# Patient Record
Sex: FEMALE | Race: WHITE | NOT HISPANIC OR LATINO | Employment: UNEMPLOYED | URBAN - METROPOLITAN AREA
[De-identification: names, ages, dates, MRNs, and addresses within clinical notes are randomized per-mention and may not be internally consistent; named-entity substitution may affect disease eponyms.]

---

## 2017-06-01 PROBLEM — R80.9 TYPE 2 DIABETES MELLITUS WITH MICROALBUMINURIA, WITH LONG-TERM CURRENT USE OF INSULIN: Status: ACTIVE | Noted: 2017-06-01

## 2017-06-01 PROBLEM — E11.29 TYPE 2 DIABETES MELLITUS WITH MICROALBUMINURIA, WITH LONG-TERM CURRENT USE OF INSULIN: Status: ACTIVE | Noted: 2017-06-01

## 2017-06-01 PROBLEM — Z79.4 TYPE 2 DIABETES MELLITUS WITH MICROALBUMINURIA, WITH LONG-TERM CURRENT USE OF INSULIN: Status: ACTIVE | Noted: 2017-06-01

## 2017-06-02 PROBLEM — G89.29 CHRONIC BILATERAL LOW BACK PAIN WITHOUT SCIATICA: Status: ACTIVE | Noted: 2017-06-02

## 2017-06-02 PROBLEM — M54.50 CHRONIC BILATERAL LOW BACK PAIN WITHOUT SCIATICA: Status: ACTIVE | Noted: 2017-06-02

## 2017-07-13 PROBLEM — Z12.11 ENCOUNTER FOR SCREENING COLONOSCOPY: Status: ACTIVE | Noted: 2017-07-13

## 2018-01-29 ENCOUNTER — TELEPHONE (OUTPATIENT)
Dept: ADMINISTRATIVE | Facility: HOSPITAL | Age: 58
End: 2018-01-29

## 2019-01-30 ENCOUNTER — TELEPHONE (OUTPATIENT)
Dept: ADMINISTRATIVE | Facility: HOSPITAL | Age: 59
End: 2019-01-30

## 2020-10-28 ENCOUNTER — HOSPITAL ENCOUNTER (EMERGENCY)
Facility: HOSPITAL | Age: 60
Discharge: HOME | End: 2020-10-28
Attending: EMERGENCY MEDICINE
Payer: MEDICAID

## 2020-10-28 VITALS
HEART RATE: 82 BPM | DIASTOLIC BLOOD PRESSURE: 83 MMHG | SYSTOLIC BLOOD PRESSURE: 152 MMHG | TEMPERATURE: 98 F | RESPIRATION RATE: 18 BRPM | OXYGEN SATURATION: 99 %

## 2020-10-28 DIAGNOSIS — I10 HYPERTENSION, UNSPECIFIED TYPE: ICD-10-CM

## 2020-10-28 DIAGNOSIS — R53.1 WEAKNESS: Primary | ICD-10-CM

## 2020-10-28 LAB
ALBUMIN SERPL BCP-MCNC: 3.9 G/DL (ref 3.5–5.2)
ALP SERPL-CCNC: 133 U/L (ref 55–135)
ALT SERPL W/O P-5'-P-CCNC: 35 U/L (ref 10–44)
ANION GAP SERPL CALC-SCNC: 6 MMOL/L (ref 8–16)
AST SERPL-CCNC: 28 U/L (ref 10–40)
BACTERIA #/AREA URNS HPF: ABNORMAL /HPF
BASOPHILS # BLD AUTO: 0.05 K/UL (ref 0–0.2)
BASOPHILS NFR BLD: 0.4 % (ref 0–1.9)
BILIRUB SERPL-MCNC: 0.5 MG/DL (ref 0.1–1)
BILIRUB UR QL STRIP: NEGATIVE
BUN SERPL-MCNC: 7 MG/DL (ref 6–20)
CALCIUM SERPL-MCNC: 9.1 MG/DL (ref 8.7–10.5)
CHLORIDE SERPL-SCNC: 106 MMOL/L (ref 95–110)
CLARITY UR: CLEAR
CO2 SERPL-SCNC: 27 MMOL/L (ref 23–29)
COLOR UR: YELLOW
CREAT SERPL-MCNC: 0.7 MG/DL (ref 0.5–1.4)
DIFFERENTIAL METHOD: ABNORMAL
EOSINOPHIL # BLD AUTO: 0.2 K/UL (ref 0–0.5)
EOSINOPHIL NFR BLD: 1.5 % (ref 0–8)
ERYTHROCYTE [DISTWIDTH] IN BLOOD BY AUTOMATED COUNT: 12.9 % (ref 11.5–14.5)
EST. GFR  (AFRICAN AMERICAN): >60 ML/MIN/1.73 M^2
EST. GFR  (NON AFRICAN AMERICAN): >60 ML/MIN/1.73 M^2
GLUCOSE SERPL-MCNC: 191 MG/DL (ref 70–110)
GLUCOSE UR QL STRIP: NEGATIVE
HCT VFR BLD AUTO: 41.4 % (ref 37–48.5)
HGB BLD-MCNC: 13.2 G/DL (ref 12–16)
HGB UR QL STRIP: NEGATIVE
HYALINE CASTS #/AREA URNS LPF: 1 /LPF
IMM GRANULOCYTES # BLD AUTO: 0.04 K/UL (ref 0–0.04)
IMM GRANULOCYTES NFR BLD AUTO: 0.3 % (ref 0–0.5)
KETONES UR QL STRIP: NEGATIVE
LEUKOCYTE ESTERASE UR QL STRIP: ABNORMAL
LYMPHOCYTES # BLD AUTO: 1.8 K/UL (ref 1–4.8)
LYMPHOCYTES NFR BLD: 15.8 % (ref 18–48)
MCH RBC QN AUTO: 29.5 PG (ref 27–31)
MCHC RBC AUTO-ENTMCNC: 31.9 G/DL (ref 32–36)
MCV RBC AUTO: 92 FL (ref 82–98)
MICROSCOPIC COMMENT: ABNORMAL
MONOCYTES # BLD AUTO: 0.7 K/UL (ref 0.3–1)
MONOCYTES NFR BLD: 6 % (ref 4–15)
NEUTROPHILS # BLD AUTO: 8.8 K/UL (ref 1.8–7.7)
NEUTROPHILS NFR BLD: 76 % (ref 38–73)
NITRITE UR QL STRIP: NEGATIVE
NRBC BLD-RTO: 0 /100 WBC
PH UR STRIP: 7 [PH] (ref 5–8)
PLATELET # BLD AUTO: 308 K/UL (ref 150–350)
PMV BLD AUTO: 10.8 FL (ref 9.2–12.9)
POTASSIUM SERPL-SCNC: 3.9 MMOL/L (ref 3.5–5.1)
PROT SERPL-MCNC: 8.3 G/DL (ref 6–8.4)
PROT UR QL STRIP: ABNORMAL
RBC # BLD AUTO: 4.48 M/UL (ref 4–5.4)
RBC #/AREA URNS HPF: 1 /HPF (ref 0–4)
SODIUM SERPL-SCNC: 139 MMOL/L (ref 136–145)
SP GR UR STRIP: <=1.005 (ref 1–1.03)
SQUAMOUS #/AREA URNS HPF: 1 /HPF
URN SPEC COLLECT METH UR: ABNORMAL
UROBILINOGEN UR STRIP-ACNC: NEGATIVE EU/DL
WBC # BLD AUTO: 11.61 K/UL (ref 3.9–12.7)
WBC #/AREA URNS HPF: 10 /HPF (ref 0–5)

## 2020-10-28 PROCEDURE — 99284 EMERGENCY DEPT VISIT MOD MDM: CPT | Mod: 25

## 2020-10-28 PROCEDURE — 85025 COMPLETE CBC W/AUTO DIFF WBC: CPT

## 2020-10-28 PROCEDURE — 36415 COLL VENOUS BLD VENIPUNCTURE: CPT

## 2020-10-28 PROCEDURE — 81000 URINALYSIS NONAUTO W/SCOPE: CPT

## 2020-10-28 PROCEDURE — 80053 COMPREHEN METABOLIC PANEL: CPT

## 2020-10-28 RX ORDER — METOPROLOL TARTRATE 25 MG/1
25 TABLET, FILM COATED ORAL 2 TIMES DAILY
COMMUNITY
End: 2023-06-23

## 2020-10-28 RX ORDER — HYDROCHLOROTHIAZIDE 12.5 MG/1
12.5 CAPSULE ORAL DAILY
COMMUNITY
End: 2021-11-30

## 2020-10-28 RX ORDER — AZILSARTAN KAMEDOXOMIL 40 MG/1
TABLET ORAL DAILY
COMMUNITY
End: 2023-04-12

## 2020-10-28 RX ORDER — NAPROXEN SODIUM 220 MG/1
81 TABLET, FILM COATED ORAL DAILY
COMMUNITY
End: 2021-11-30

## 2020-10-28 RX ORDER — AMLODIPINE BESYLATE 5 MG/1
10 TABLET ORAL DAILY
COMMUNITY
End: 2023-04-12 | Stop reason: SDUPTHER

## 2020-10-28 RX ORDER — ALBUTEROL SULFATE 90 UG/1
2 AEROSOL, METERED RESPIRATORY (INHALATION) EVERY 6 HOURS PRN
COMMUNITY

## 2020-10-28 NOTE — ED PROVIDER NOTES
Encounter Date: 10/28/2020       History     Chief Complaint   Patient presents with    Extremity Weakness     right sided weakness x 1 month with HTN.  Today fell from the weakness.  Per PCP and [facility] it's caused from her HTN meds.     This is a 60-year-old white female history of diabetes and hypertension, presents to the emergency department after sliding out of bed.  Patient denies any pain.  She has a history of right-sided arm and leg weakness for over a month, was seen at an outlying emergency department where she was worked up and told it was due to her blood pressure medication.        Review of patient's allergies indicates:   Allergen Reactions    Levemir [insulin detemir] Other (See Comments)     Sob rash to arms and chest  abd cramping diarrhea    Lisinopril Swelling    Shellfish containing products      Other^asthma    Losartan Other (See Comments)     Muscle cramp     Past Medical History:   Diagnosis Date    Cirrhosis of liver without mention of alcohol     COPD (chronic obstructive pulmonary disease)     Diabetes mellitus, type 2     Gout, unspecified     Hyperlipidemia     Hypertension     Obesity, unspecified     Unspecified vitamin D deficiency      Past Surgical History:   Procedure Laterality Date     SECTION  , ,  1985     x3    CHOLECYSTECTOMY  2011    COLONOSCOPY N/A 2017    Procedure: COLONOSCOPY;  Surgeon: Luis Bogran-Reyes, MD;  Location: [facility];  Service: Endoscopy;  Laterality: N/A;     Family History   Problem Relation Age of Onset    Alzheimer's disease Mother     Stroke Mother     Hypertension Mother     Heart attack Father     Hypertension Father     Diabetes Maternal Grandmother      Social History     Tobacco Use    Smoking status: Former Smoker     Quit date: 1979     Years since quittin.7    Smokeless tobacco: Never Used   Substance Use Topics    Alcohol use: No    Drug use: No     Review of Systems    Constitutional: Negative for fever.   HENT: Negative for sore throat.    Respiratory: Negative for shortness of breath.    Cardiovascular: Negative for chest pain.   Gastrointestinal: Negative for nausea.   Genitourinary: Negative for dysuria.   Musculoskeletal: Negative for back pain.   Skin: Negative for rash.   Neurological: Positive for weakness.   Hematological: Does not bruise/bleed easily.   All other systems reviewed and are negative.      Physical Exam     Initial Vitals [10/28/20 1032]   BP Pulse Resp Temp SpO2   (!) 191/86 85 18 98.5 °F (36.9 °C) 99 %      MAP       --         Physical Exam    Nursing note and vitals reviewed.  Constitutional: She appears well-developed and well-nourished. She is not diaphoretic. No distress.   HENT:   Head: Normocephalic and atraumatic.   Eyes: Conjunctivae and EOM are normal. Pupils are equal, round, and reactive to light.   Neck: Normal range of motion. Neck supple.   Cardiovascular: Normal rate, regular rhythm, normal heart sounds and intact distal pulses.   No murmur heard.  Pulmonary/Chest: Breath sounds normal. No respiratory distress. She has no wheezes. She has no rhonchi. She has no rales. She exhibits no tenderness.   Abdominal: Soft. Bowel sounds are normal.   Musculoskeletal: Normal range of motion. No tenderness or edema.   Neurological: She is alert and oriented to person, place, and time. She has normal reflexes. No cranial nerve deficit. GCS score is 15. GCS eye subscore is 4. GCS verbal subscore is 5. GCS motor subscore is 6.   A very slight weakness of the right arm and right leg.  Negative Babinski.  Strong pulses.  No distal cyanosis.   Skin: Skin is warm and dry. Capillary refill takes less than 2 seconds.         ED Course   Procedures  Labs Reviewed   CBC W/ AUTO DIFFERENTIAL - Abnormal; Notable for the following components:       Result Value    MCHC 31.9 (*)     Gran # (ANC) 8.8 (*)     Gran % 76.0 (*)     Lymph % 15.8 (*)     All other  components within normal limits   COMPREHENSIVE METABOLIC PANEL - Abnormal; Notable for the following components:    Glucose 191 (*)     Anion Gap 6 (*)     All other components within normal limits   URINALYSIS, REFLEX TO URINE CULTURE - Abnormal; Notable for the following components:    Specific Gravity, UA <=1.005 (*)     Protein, UA Trace (*)     Leukocytes, UA 1+ (*)     All other components within normal limits    Narrative:     Specimen Source->Urine   URINALYSIS MICROSCOPIC - Abnormal; Notable for the following components:    WBC, UA 10 (*)     Bacteria Moderate (*)     All other components within normal limits    Narrative:     Specimen Source->Urine          Imaging Results          CT Head Without Contrast (Final result)  Result time 10/28/20 10:58:16    Final result by Tracie Malin MD (10/28/20 10:58:16)                 Impression:      No acute intracranial findings.      Electronically signed by: Tracie Malin MD  Date:    10/28/2020  Time:    10:58             Narrative:    EXAMINATION:  CT HEAD WITHOUT CONTRAST    CLINICAL HISTORY:  Ataxia, stroke suspected;    TECHNIQUE:  Iterative reconstruction technique was performed.    CT/Cardiac Nuclear exams in prior 12 months: 0    COMPARISON:  None.    FINDINGS:  Ventricles and midline structures are normal.  No mass lesion acute hemorrhage or acute infarction.  The skull is intact.  Mild chronic changes                               RADIOLOGY REPORT (Final result)  Result time 10/29/20 12:02:35    Final result by Unknown User (10/29/20 12:02:35)                                                     ED Course as of Nov 04 0606   Wed Oct 28, 2020   1102 CT is negative for any acute changes.    [SD]   1148 RBC, UA(!): Review [SD]      ED Course User Index  [SD] Chavo Jang MD            Clinical Impression:     ICD-10-CM ICD-9-CM   1. Weakness  R53.1 780.79   2. Hypertension, unspecified type  I10 401.9                          ED Disposition Condition     Discharge Stable        ED Prescriptions     None        Follow-up Information     Follow up With Specialties Details Why Contact Info    Primary care physician  In 2 days                                         Chavo Jang MD  10/28/20 1127       Chavo Jang MD  11/04/20 0606

## 2020-10-28 NOTE — ED NOTES
NEUROLOGICAL:   Patient is awake , alert  and oriented x 4 . Pupils are PERRL. Gait is unsteady  Moves all extremities without difficulty.   Patient reports right sided weakness  GCS 15     HEENT:   Head appears normocephalic  and symmetric .   Eyes appear WNL to both eyes. Patient reports no complaints  to both eyes .   Ears appear WNL. Patient reports no complaints  to both ears.   Nares appear patent . Patient reports no nose complaints .  Mouth appears moist, pink and teeth intact. Patient reports no mouth complaints.   Throat appears pink and moist . Patient reports no throat complaints.    CARDIOVASCULAR:   S1 and S2 present, no murmurs, gallops, or rubs, rate regular  and pulses palpable (2+)    On palpation no edema noted , noted to none.   Patient reports no CV complaints.  .   Patient vitals are WNL.    RESPIRATORY:   Airway Clear, Open, and Patent.  Respirations are even and unlabored.   Breath sounds clear  to all lung fields.   Patient reports no respiratory complaints.     GASTROINTESTINAL:   Abdomen is soft  and non-tender x 4 quadrants. Bowel sounds are normoactive to all quadrants .   Patient reports no GI complaints .     GENITOURINARY:   Patient reports no  complaints.     MUSCULOSKELETAL:   full range of motion to all extremities, no swelling noted , no tenderness noted and no weakness noted.   Patient reports no musculoskeletal complaints     SKIN:   Skin appears warm , dry , good turgor, color normal for race and intact. Patient reports no skin complaints.

## 2020-10-29 ENCOUNTER — HOSPITAL ENCOUNTER (OUTPATIENT)
Facility: HOSPITAL | Age: 60
Discharge: HOME | End: 2020-10-30
Attending: EMERGENCY MEDICINE | Admitting: EMERGENCY MEDICINE
Payer: MEDICAID

## 2020-10-29 DIAGNOSIS — R53.1 UNILATERAL WEAKNESS: ICD-10-CM

## 2020-10-29 DIAGNOSIS — N30.90 CYSTITIS: ICD-10-CM

## 2020-10-29 DIAGNOSIS — E11.9 TYPE 2 DIABETES MELLITUS WITHOUT OPHTHALMIC MANIFESTATIONS: ICD-10-CM
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
## 2020-10-29 DIAGNOSIS — N30.00 ACUTE CYSTITIS WITHOUT HEMATURIA: ICD-10-CM

## 2020-10-29 DIAGNOSIS — M51.36 DDD (DEGENERATIVE DISC DISEASE), LUMBAR: Primary | ICD-10-CM

## 2020-10-29 PROBLEM — G72.9 MYOPATHY: Status: ACTIVE | Noted: 2020-10-29

## 2020-10-29 PROBLEM — M51.369 DDD (DEGENERATIVE DISC DISEASE), LUMBAR: Status: ACTIVE | Noted: 2020-10-29

## 2020-10-29 LAB
ALBUMIN SERPL BCP-MCNC: 4 G/DL (ref 3.5–5.2)
ALP SERPL-CCNC: 133 U/L (ref 55–135)
ALT SERPL W/O P-5'-P-CCNC: 36 U/L (ref 10–44)
AMMONIA PLAS-SCNC: <10 UMOL/L (ref 10–50)
ANION GAP SERPL CALC-SCNC: 6 MMOL/L (ref 8–16)
AST SERPL-CCNC: 30 U/L (ref 10–40)
BACTERIA #/AREA URNS HPF: NEGATIVE /HPF
BASOPHILS # BLD AUTO: 0.05 K/UL (ref 0–0.2)
BASOPHILS NFR BLD: 0.4 % (ref 0–1.9)
BILIRUB SERPL-MCNC: 0.6 MG/DL (ref 0.1–1)
BILIRUB UR QL STRIP: NEGATIVE
BUN SERPL-MCNC: 10 MG/DL (ref 6–20)
CALCIUM SERPL-MCNC: 8.9 MG/DL (ref 8.7–10.5)
CHLORIDE SERPL-SCNC: 105 MMOL/L (ref 95–110)
CLARITY UR: CLEAR
CO2 SERPL-SCNC: 26 MMOL/L (ref 23–29)
COLOR UR: YELLOW
CREAT SERPL-MCNC: 0.9 MG/DL (ref 0.5–1.4)
DIFFERENTIAL METHOD: ABNORMAL
EOSINOPHIL # BLD AUTO: 0.1 K/UL (ref 0–0.5)
EOSINOPHIL NFR BLD: 1 % (ref 0–8)
ERYTHROCYTE [DISTWIDTH] IN BLOOD BY AUTOMATED COUNT: 12.8 % (ref 11.5–14.5)
EST. GFR  (AFRICAN AMERICAN): >60 ML/MIN/1.73 M^2
EST. GFR  (NON AFRICAN AMERICAN): >60 ML/MIN/1.73 M^2
ETHANOL SERPL-MCNC: <3 MG/DL
GLUCOSE SERPL-MCNC: 236 MG/DL (ref 70–110)
GLUCOSE UR QL STRIP: NEGATIVE
HCT VFR BLD AUTO: 42 % (ref 37–48.5)
HGB BLD-MCNC: 13.5 G/DL (ref 12–16)
HGB UR QL STRIP: NEGATIVE
HYALINE CASTS #/AREA URNS LPF: 1 /LPF
IMM GRANULOCYTES # BLD AUTO: 0.06 K/UL (ref 0–0.04)
IMM GRANULOCYTES NFR BLD AUTO: 0.5 % (ref 0–0.5)
KETONES UR QL STRIP: ABNORMAL
LEUKOCYTE ESTERASE UR QL STRIP: ABNORMAL
LYMPHOCYTES # BLD AUTO: 2 K/UL (ref 1–4.8)
LYMPHOCYTES NFR BLD: 15.7 % (ref 18–48)
MCH RBC QN AUTO: 29.5 PG (ref 27–31)
MCHC RBC AUTO-ENTMCNC: 32.1 G/DL (ref 32–36)
MCV RBC AUTO: 92 FL (ref 82–98)
MICROSCOPIC COMMENT: ABNORMAL
MONOCYTES # BLD AUTO: 0.9 K/UL (ref 0.3–1)
MONOCYTES NFR BLD: 6.8 % (ref 4–15)
NEUTROPHILS # BLD AUTO: 9.4 K/UL (ref 1.8–7.7)
NEUTROPHILS NFR BLD: 75.6 % (ref 38–73)
NITRITE UR QL STRIP: NEGATIVE
NRBC BLD-RTO: 0 /100 WBC
PH UR STRIP: 7 [PH] (ref 5–8)
PLATELET # BLD AUTO: 310 K/UL (ref 150–350)
PMV BLD AUTO: 11.3 FL (ref 9.2–12.9)
POCT GLUCOSE: 186 MG/DL (ref 70–110)
POTASSIUM SERPL-SCNC: 3.9 MMOL/L (ref 3.5–5.1)
PROT SERPL-MCNC: 8.3 G/DL (ref 6–8.4)
PROT UR QL STRIP: ABNORMAL
RBC # BLD AUTO: 4.57 M/UL (ref 4–5.4)
RBC #/AREA URNS HPF: 1 /HPF (ref 0–4)
SARS-COV-2 RDRP RESP QL NAA+PROBE: NEGATIVE
SODIUM SERPL-SCNC: 137 MMOL/L (ref 136–145)
SP GR UR STRIP: >=1.03 (ref 1–1.03)
SQUAMOUS #/AREA URNS HPF: 1 /HPF
TROPONIN I SERPL DL<=0.01 NG/ML-MCNC: <0.02 NG/ML (ref 0–0.03)
TSH SERPL DL<=0.005 MIU/L-ACNC: 2.34 UIU/ML (ref 0.4–4)
URN SPEC COLLECT METH UR: ABNORMAL
UROBILINOGEN UR STRIP-ACNC: NEGATIVE EU/DL
WBC # BLD AUTO: 12.46 K/UL (ref 3.9–12.7)
WBC #/AREA URNS HPF: 29 /HPF (ref 0–5)

## 2020-10-29 PROCEDURE — 99900031 HC PATIENT EDUCATION (STAT)

## 2020-10-29 PROCEDURE — G0378 HOSPITAL OBSERVATION PER HR: HCPCS | Mod: OBSCO

## 2020-10-29 PROCEDURE — 97162 PT EVAL MOD COMPLEX 30 MIN: CPT

## 2020-10-29 PROCEDURE — 25000003 PHARM REV CODE 250: Performed by: EMERGENCY MEDICINE

## 2020-10-29 PROCEDURE — 80053 COMPREHEN METABOLIC PANEL: CPT

## 2020-10-29 PROCEDURE — 63600175 PHARM REV CODE 636 W HCPCS: Performed by: INTERNAL MEDICINE

## 2020-10-29 PROCEDURE — 25000003 PHARM REV CODE 250: Performed by: NURSE PRACTITIONER

## 2020-10-29 PROCEDURE — 96372 THER/PROPH/DIAG INJ SC/IM: CPT | Mod: 59

## 2020-10-29 PROCEDURE — 36415 COLL VENOUS BLD VENIPUNCTURE: CPT

## 2020-10-29 PROCEDURE — 81000 URINALYSIS NONAUTO W/SCOPE: CPT

## 2020-10-29 PROCEDURE — 84443 ASSAY THYROID STIM HORMONE: CPT

## 2020-10-29 PROCEDURE — 87086 URINE CULTURE/COLONY COUNT: CPT

## 2020-10-29 PROCEDURE — 97165 OT EVAL LOW COMPLEX 30 MIN: CPT

## 2020-10-29 PROCEDURE — 99900035 HC TECH TIME PER 15 MIN (STAT)

## 2020-10-29 PROCEDURE — 84484 ASSAY OF TROPONIN QUANT: CPT

## 2020-10-29 PROCEDURE — 25500020 PHARM REV CODE 255: Performed by: EMERGENCY MEDICINE

## 2020-10-29 PROCEDURE — 80320 DRUG SCREEN QUANTALCOHOLS: CPT

## 2020-10-29 PROCEDURE — 82140 ASSAY OF AMMONIA: CPT

## 2020-10-29 PROCEDURE — 99285 EMERGENCY DEPT VISIT HI MDM: CPT | Mod: 25

## 2020-10-29 PROCEDURE — 94761 N-INVAS EAR/PLS OXIMETRY MLT: CPT

## 2020-10-29 PROCEDURE — G0378 HOSPITAL OBSERVATION PER HR: HCPCS

## 2020-10-29 PROCEDURE — 85025 COMPLETE CBC W/AUTO DIFF WBC: CPT

## 2020-10-29 PROCEDURE — U0002 COVID-19 LAB TEST NON-CDC: HCPCS

## 2020-10-29 RX ORDER — AMLODIPINE BESYLATE 5 MG/1
5 TABLET ORAL DAILY
Status: DISCONTINUED | OUTPATIENT
Start: 2020-10-29 | End: 2020-10-30 | Stop reason: HOSPADM

## 2020-10-29 RX ORDER — SODIUM CHLORIDE 0.9 % (FLUSH) 0.9 %
10 SYRINGE (ML) INJECTION
Status: DISCONTINUED | OUTPATIENT
Start: 2020-10-29 | End: 2020-10-30 | Stop reason: HOSPADM

## 2020-10-29 RX ORDER — INSULIN GLARGINE 300 [IU]/ML
60 INJECTION, SOLUTION SUBCUTANEOUS NIGHTLY
Status: DISCONTINUED | OUTPATIENT
Start: 2020-10-29 | End: 2020-10-30 | Stop reason: HOSPADM

## 2020-10-29 RX ORDER — ALBUTEROL SULFATE 90 UG/1
2 AEROSOL, METERED RESPIRATORY (INHALATION) EVERY 6 HOURS PRN
Status: DISCONTINUED | OUTPATIENT
Start: 2020-10-29 | End: 2020-10-29

## 2020-10-29 RX ORDER — NAPROXEN SODIUM 220 MG/1
81 TABLET, FILM COATED ORAL DAILY
Status: DISCONTINUED | OUTPATIENT
Start: 2020-10-29 | End: 2020-10-30 | Stop reason: HOSPADM

## 2020-10-29 RX ORDER — METOPROLOL TARTRATE 25 MG/1
25 TABLET, FILM COATED ORAL 2 TIMES DAILY
Status: DISCONTINUED | OUTPATIENT
Start: 2020-10-29 | End: 2020-10-30 | Stop reason: HOSPADM

## 2020-10-29 RX ORDER — ALBUTEROL SULFATE 2.5 MG/.5ML
2.5 SOLUTION RESPIRATORY (INHALATION) EVERY 6 HOURS PRN
Status: DISCONTINUED | OUTPATIENT
Start: 2020-10-29 | End: 2020-10-30 | Stop reason: HOSPADM

## 2020-10-29 RX ORDER — DOCUSATE SODIUM 100 MG/1
100 CAPSULE, LIQUID FILLED ORAL
Status: DISCONTINUED | OUTPATIENT
Start: 2020-10-30 | End: 2020-10-30 | Stop reason: HOSPADM

## 2020-10-29 RX ORDER — CHLORTHALIDONE 25 MG/1
25 TABLET ORAL DAILY
Status: DISCONTINUED | OUTPATIENT
Start: 2020-10-29 | End: 2020-10-30 | Stop reason: HOSPADM

## 2020-10-29 RX ORDER — INSULIN ASPART 100 [IU]/ML
20 INJECTION, SOLUTION INTRAVENOUS; SUBCUTANEOUS
Status: DISCONTINUED | OUTPATIENT
Start: 2020-10-29 | End: 2020-10-30 | Stop reason: HOSPADM

## 2020-10-29 RX ORDER — NITROFURANTOIN 25; 75 MG/1; MG/1
100 CAPSULE ORAL EVERY 12 HOURS
Status: DISCONTINUED | OUTPATIENT
Start: 2020-10-29 | End: 2020-10-30 | Stop reason: HOSPADM

## 2020-10-29 RX ORDER — FAMOTIDINE 20 MG/1
20 TABLET, FILM COATED ORAL 2 TIMES DAILY
Status: DISCONTINUED | OUTPATIENT
Start: 2020-10-29 | End: 2020-10-30 | Stop reason: HOSPADM

## 2020-10-29 RX ADMIN — FAMOTIDINE 20 MG: 20 TABLET ORAL at 09:10

## 2020-10-29 RX ADMIN — IOHEXOL 100 ML: 350 INJECTION, SOLUTION INTRAVENOUS at 03:10

## 2020-10-29 RX ADMIN — METOPROLOL TARTRATE 25 MG: 25 TABLET, FILM COATED ORAL at 09:10

## 2020-10-29 RX ADMIN — AMLODIPINE BESYLATE 5 MG: 5 TABLET ORAL at 02:10

## 2020-10-29 RX ADMIN — NITROFURANTOIN (MONOHYDRATE/MACROCRYSTALS) 100 MG: 75; 25 CAPSULE ORAL at 09:10

## 2020-10-29 RX ADMIN — INSULIN ASPART 20 UNITS: 100 INJECTION, SOLUTION INTRAVENOUS; SUBCUTANEOUS at 05:10

## 2020-10-29 RX ADMIN — NITROFURANTOIN (MONOHYDRATE/MACROCRYSTALS) 100 MG: 75; 25 CAPSULE ORAL at 05:10

## 2020-10-29 RX ADMIN — FAMOTIDINE 20 MG: 20 TABLET ORAL at 02:10

## 2020-10-29 RX ADMIN — ASPIRIN 81 MG 81 MG: 81 TABLET ORAL at 02:10

## 2020-10-29 NOTE — SUBJECTIVE & OBJECTIVE
Past Medical History:   Diagnosis Date    Cirrhosis of liver without mention of alcohol     COPD (chronic obstructive pulmonary disease)     Diabetes mellitus, type 2     Gout, unspecified     Hyperlipidemia     Hypertension     Obesity, unspecified     Unspecified vitamin D deficiency        Past Surgical History:   Procedure Laterality Date     SECTION  , ,  1985     x3    CHOLECYSTECTOMY  2011    COLONOSCOPY N/A 2017    Procedure: COLONOSCOPY;  Surgeon: Luis Bogran-Reyes, MD;  [facility]: [facility];  Service: Endoscopy;  Laterality: N/A;       Review of patient's allergies indicates:   Allergen Reactions    Levemir [insulin detemir] Other (See Comments)     Sob rash to arms and chest  abd cramping diarrhea    Lisinopril Swelling    Shellfish containing products      Other^asthma    Losartan Other (See Comments)     Muscle cramp       No current facility-administered medications on file prior to encounter.      Current Outpatient Medications on File Prior to Encounter   Medication Sig    albuterol (PROVENTIL/VENTOLIN HFA) 90 mcg/actuation inhaler Inhale 2 puffs into the lungs every 6 (six) hours as needed for Wheezing. Rescue    amLODIPine (NORVASC) 5 MG tablet Take 5 mg by mouth once daily.    aspirin 81 MG Chew Take 81 mg by mouth once daily.    azilsartan medoxomiL (EDARBI) 40 mg Tab Take by mouth once daily.    blood sugar diagnostic (BLOOD GLUCOSE TEST) Strp 1 strip by Misc.(Non-Drug; Combo Route) route 3 (three) times daily.    chlorthalidone (HYGROTEN) 25 MG Tab Take 25 mg by mouth once daily.    docusate sodium (COLACE) 100 MG capsule Take 100 mg by mouth 3 (three) times a week.     hydrALAZINE (APRESOLINE) 50 MG tablet Take 1 tablet (50 mg total) by mouth 2 (two) times daily.    hydroCHLOROthiazide (MICROZIDE) 12.5 mg capsule Take 12.5 mg by mouth once daily.    insulin aspart (NOVOLOG FLEXPEN) 100 unit/mL InPn pen Inject 40 Units into the skin 3 (three)  "times daily before meals.    insulin glargine, TOUJEO, (TOUJEO SOLOSTAR U-300 INSULIN) 300 unit/mL (1.5 mL) InPn pen Inject 150 Units into the skin.    insulin needles, disposable, 32 x 1/4 " Ndle 1 Device by Misc.(Non-Drug; Combo Route) route 4 (four) times daily.    LANCETS MISC 1 Act by Misc.(Non-Drug; Combo Route) route 3 (three) times daily.    meclizine (ANTIVERT) 25 mg tablet Take 1 tablet (25 mg total) by mouth 3 (three) times daily as needed for Dizziness ((May take 2 tabs if necessary - watch for drowsiness)).    metoprolol tartrate (LOPRESSOR) 25 MG tablet Take 25 mg by mouth 2 (two) times daily.    silver sulfADIAZINE 1% (SILVADENE) 1 % cream     tetrahydrozoline-zinc (VISINE-AC) 0.05-0.25 % Drop 1 drop 3 (three) times daily as needed.     Family History     Problem Relation (Age of Onset)    Alzheimer's disease Mother    Diabetes Maternal Grandmother    Heart attack Father    Hypertension Mother, Father    Stroke Mother        Tobacco Use    Smoking status: Former Smoker     Quit date: 1979     Years since quittin.7    Smokeless tobacco: Never Used   Substance and Sexual Activity    Alcohol use: No    Drug use: No    Sexual activity: Yes     Partners: Male     Birth control/protection: Surgical     Review of Systems   Constitutional: Positive for activity change. Negative for appetite change, chills and fever.   HENT: Negative for ear pain, mouth sores, sinus pressure, sinus pain and sore throat.    Eyes: Negative for photophobia and visual disturbance.   Respiratory: Negative for chest tightness, shortness of breath and wheezing.    Cardiovascular: Negative for chest pain, palpitations and leg swelling.   Gastrointestinal: Negative for constipation, diarrhea, nausea and vomiting.   Endocrine: Negative for cold intolerance and heat intolerance.   Genitourinary: Positive for frequency. Negative for difficulty urinating, dysuria, flank pain and urgency.   Musculoskeletal: Positive for " myalgias. Negative for arthralgias.   Skin: Positive for wound. Negative for rash.   Neurological: Positive for weakness and numbness. Negative for dizziness, facial asymmetry and speech difficulty.   Psychiatric/Behavioral: Negative for agitation and confusion. The patient is not nervous/anxious.      Objective:     Vital Signs (Most Recent):  Temp: 98 °F (36.7 °C) (10/29/20 0900)  Pulse: 81 (10/29/20 0900)  Resp: 20 (10/29/20 0900)  BP: (!) 156/72 (10/29/20 0900)  SpO2: 97 % (10/29/20 0900) Vital Signs (24h Range):  Temp:  [97.9 °F (36.6 °C)-98.5 °F (36.9 °C)] 98 °F (36.7 °C)  Pulse:  [75-90] 81  Resp:  [18-20] 20  SpO2:  [95 %-99 %] 97 %  BP: (152-199)/(69-86) 156/72     Weight: 108 kg (238 lb)  Body mass index is 40.85 kg/m².    Physical Exam  Vitals signs and nursing note reviewed.   Constitutional:       General: She is not in acute distress.     Appearance: She is obese.   HENT:      Head: Normocephalic and atraumatic.      Nose: Nose normal.      Mouth/Throat:      Mouth: Mucous membranes are moist.      Pharynx: Oropharynx is clear.   Eyes:      Extraocular Movements: EOM normal.   Neck:      Musculoskeletal: Normal range of motion and neck supple.   Cardiovascular:      Rate and Rhythm: Normal rate and regular rhythm.      Pulses: Normal pulses.      Heart sounds: Normal heart sounds. No murmur.   Pulmonary:      Effort: Pulmonary effort is normal.      Breath sounds: Normal breath sounds. No wheezing, rhonchi or rales.   Abdominal:      General: Bowel sounds are normal.      Palpations: Abdomen is soft.      Tenderness: There is no abdominal tenderness. There is no guarding.   Musculoskeletal:         General: No swelling, tenderness, deformity or signs of injury.      Right lower leg: No edema.      Left lower leg: No edema.   Skin:     General: Skin is warm and dry.      Capillary Refill: Capillary refill takes less than 2 seconds.      Findings: Bruising present.   Neurological:      Mental Status: She  is alert and oriented to person, place, and time.      Motor: Weakness present.      Gait: Gait abnormal.      Comments: Mild weakness to R trapezius and RUE. Severe weakness to RLE.   Psychiatric:         Mood and Affect: Mood normal.         Behavior: Behavior normal.         Thought Content: Thought content normal.         Judgment: Judgment normal.           CRANIAL NERVES     CN I  cranial nerve I not tested    CN II   Visual fields full to confrontation.   Right visual field deficit: none  Left visual field deficit: none     CN III, IV, VI   Extraocular motions are normal.   Right pupil: Size: 3 mm. Shape: regular. Reactivity: brisk. Consensual response: intact. Accommodation: intact.   Left pupil: Size: 3 mm. Shape: regular. Reactivity: brisk. Consensual response: intact. Accommodation: intact.   CN III: no CN III palsy  CN VI: no CN VI palsy  Nystagmus: none   Diplopia: none  Ophthalmoparesis: none  Upgaze: normal  Downgaze: normal  Conjugate gaze: present  Vestibulo-ocular reflex: present    CN V   Facial sensation intact.     CN VII   Facial expression full, symmetric.   Right facial weakness: none  Left facial weakness: none  Right taste: normal  Left taste: normal    CN IX, X   CN IX normal.   CN X normal.     CN XI   Right sternocleidomastoid strength: normal  Left sternocleidomastoid strength: normal  Right trapezius strength: weak  Left trapezius strength: normal    CN XII   CN XII normal.        Significant Labs:   CBC:   Recent Labs   Lab 10/28/20  1055 10/29/20  0249   WBC 11.61 12.46   HGB 13.2 13.5   HCT 41.4 42.0    310     CMP:   Recent Labs   Lab 10/28/20  1055 10/29/20  0249    137   K 3.9 3.9    105   CO2 27 26   * 236*   BUN 7 10   CREATININE 0.7 0.9   CALCIUM 9.1 8.9   PROT 8.3 8.3   ALBUMIN 3.9 4.0   BILITOT 0.5 0.6   ALKPHOS 133 133   AST 28 30   ALT 35 36   ANIONGAP 6* 6*   EGFRNONAA >60.0 >60.0     All pertinent labs within the past 24 hours have been  reviewed.    Significant Imaging: I have reviewed all pertinent imaging results/findings within the past 24 hours.

## 2020-10-29 NOTE — HOSPITAL COURSE
10/29/2020 SD: Continue macrobid as initiated in ER for treatment of UTI. Pt reports numbness and weakness have gradually worsened - MRI cervical and lumbar spine pending. PT/ and OT evaluation and treatment. SCD and famotidine for DVT and GI proph.  10/30/2020 SD: MRI c-spine indicates a broad-based disc or uncinate proliferation and questionable minimal cord compression at C5-6. Discussed findings with Dr. Garcia. Plan to D/C home with c-collar and neurosurgery follow-up as soon as possible. Continue taking Macrobid for UTI.

## 2020-10-29 NOTE — HPI
This is a 60-year-old white female history of diabetes and hypertension, presents to the emergency department after sliding out of bed.  Patient denies any pain.  She has a history of right-sided arm and leg weakness for over a month, was seen at an [facility] emergency department where she was worked up and told it was due to her blood pressure medication.    The patient later presented to the same emergency department with the following report... The patient is a 60-year-old female, with a past medical history of diabetes, hypertension, liver cirrhosis, and several other medical problems, that presents to the ER for evaluation because of continued right lower extremity weakness.  The patient was evaluated at this facility yesterday morning for similar symptoms, and ultimately had a negative stroke workup.  The patient states that she was essentially unable to get off of her couch, and this evening while trying to ambulate managed to fall.  She specifically denied any head trauma or neck pain.  She did states that she was experiencing some mild left-sided hip discomfort, but did not think that anything was fractured.  Patient states that she has been having progressive right lower extremity and right upper extremity paresthesias and tingling, that she had felt was secondary to blood pressure medicine.  Several weeks ago she had a blood pressure medicine changed, and reported that overall he has unilateral right-sided symptoms had improved.  She also does admit to a history of scoliosis, and states that she has always had some back discomfort from this chronic disease.  The patient very specifically denies any recent episodes of bowel or bladder incontinence, and denies any saddle anesthesia.  The patient denies any recent illness, fevers, chills, chest pain, shortness of breath, cough, congestion, abdominal pain or other acute negative symptoms

## 2020-10-29 NOTE — PLAN OF CARE
10/29/20 1448   Discharge Assessment   Assessment Type Discharge Planning Assessment   Confirmed/corrected address and phone number on facesheet? Yes   Assessment information obtained from? Patient   Prior to hospitilization cognitive status: Alert/Oriented   Prior to hospitalization functional status: Independent  (The patient says that she was completley independent prior to admission. Patient still drives.)   Current cognitive status: Alert/Oriented   Current Functional Status: Needs Assistance   Facility Arrived From: NA   Lives With alone  ( [place] of [place]   Is patient able to care for self after discharge? Unable to determine at this time (comments)  (Awaiting results of MRI to know more.)   Who are your caregiver(s) and their phone number(s)? Emergency contact: Elizabeth Hendricks (spouse) [phone]   Patient's perception of discharge disposition home or selfcare  (The patient would like to discharge home and be independent.)   Readmission Within the Last 30 Days no previous admission in last 30 days   Patient currently being followed by outpatient case management? No   Patient currently receives any other outside agency services? No   Equipment Currently Used at Home none   Do you have any problems affording any of your prescribed medications? No   Is the patient taking medications as prescribed? yes   Does the patient have transportation home? Yes   Transportation Anticipated car, drives self;family or friend will provide  (Patient was still driving prior to admit. Her son can drive her.)   Dialysis Name and Scheduled days na   Does the patient receive services at the [facility]? No   Discharge Plan A Rehab   Discharge Plan B Home with family   DME Needed Upon Discharge    (Unknown at this time. Awaiting results of MRI and PT eval.)   Patient/Family in Agreement with Plan other (see comments)  (We do not have a set plan. Awaiting results to see what is going on with patient. This will  help to plan better.)

## 2020-10-29 NOTE — ED NOTES
Bed: Exam 07  Expected date:   Expected time:   Means of arrival:   Comments:  59 YO Female Fall

## 2020-10-29 NOTE — PT/OT/SLP EVAL
"Occupational Therapy   Evaluation    Name: Lisa Hendricks  MRN: [id number]  Admitting Diagnosis:  <principal problem not specified>      Recommendations:     Discharge Recommendations:  Inpatient Rehabilitation  Discharge Equipment Recommendations:   Transfer tub bench, rolling walker, bedside commode  Barriers to discharge:   medical status, functional status    Assessment:     Lisa Hendricks is a 60 y.o. female with a medical diagnosis of R sided weakness etiology unconfirmed.  She presents with decreased functional independence and multiple falls, which has progressively worsened over the past few days. Pt states that she initially has had R LE paraesthesia that has worsened over the past few months and over the past few days has had an increase in motor deficits to RLE, RUE, and onset of RUE paraesthesia which has resulted in multiple falls and inability to perform ADLs and functional mobility tasks in her home. She states she is unable to perform ADLs and is unable to transfer herself from any surface at this time. Pt lives alone and does not consistent assistance to safely complete these tasks.  Performance deficits affecting function:  BUE/BLE weakness with R side worse than left and more proximal weakness than distal, RUE/RLE paraesthesia, coordination, endurance, and balance.      Rehab Prognosis: Good; patient would benefit from acute skilled OT services to address these deficits and reach maximum level of function.       Plan:     Patient to be seen   6x/wk to address the above listed problems via  therapeutic activities, therapeutic exercise, neuromuscular reeducation, and ADL/self care management training/  · Plan of Care Expires:  11/6/2020  · Plan of Care Reviewed with:  patient, NPP    Subjective     Chief Complaint: "A few days ago I got so weak on my right side that I keep falling and I can't take care of myself."  Patient/Family Comments/goals: Return home at [facility]    Occupational Profile:  Living " Environment:  home  Previous level of function: Independent  Roles and Routines: ADLs, leisure tasks, IADLs, driving  Equipment Used at Home:   grab bars  Assistance upon Discharge: Pt does not have safe and consistent assistance    Pain/Comfort:  ·  2/10 lower back  NPP and nursing notified    Patients cultural, spiritual, Yazdanism conflicts given the current situation:  none noted    Objective:     Communicated with: Nursing prior to session.  Patient found HOB elevated with   upon OT entry to room.    General Precautions: Standard,     Orthopedic Precautions:    Braces:       Occupational Performance:    Bed Mobility:    · Patient completed Rolling/Turning to Left with  minimum assistance  · Patient completed Rolling/Turning to Right with minimum assistance  · Patient completed Scooting/Bridging with moderate assistance  · Patient completed Supine to Sit with minimum assistance  · Patient completed Sit to Supine with moderate assistance    Functional Mobility/Transfers:  · Patient completed Sit <> Stand Transfer with moderate assistance  with  rolling walker   · Patient completed Toilet Transfer Stand Pivot technique with moderate assistance with  rolling walker  · Functional Mobility: Pt able to sidestep 5 feet to HOB with RW and Min A    Activities of Daily Living:  · Feeding:  independence .  · Grooming: contact guard assistance standing at sink  · Bathing: moderate assistance at EOB for perineum and BLE  · Upper Body Dressing: minimum assistance EOB  · Lower Body Dressing: maximal assistance at EOB for threading BLE and pulling up pants  · Toileting: maximal assistance for management of clothing and hygiene    Cognitive/Visual Perceptual:  Cognitive/Psychosocial Skills:     -       Oriented to: Person, Place, Time and Situation   -       Follows Commands/attention:Follows multistep  commands  -       Safety awareness/insight to disability: intact   Visual/Perceptual:      -Intact  -wears glasses  .    Physical Exam:  Balance: -       Static sitting Good; dynamic sitting Good-; Static standing Fair+; dynamic standing Fair-  Dominant hand:    -       Left  Upper Extremity Range of Motion:     -       Right Upper Extremity: WFL  -       Left Upper Extremity: WFL  Upper Extremity Strength:    -       Right Upper Extremity: Deficits: R shoulder 3/5 ; R elbow, wrist, hand 3+/5  -       Left Upper Extremity: Deficits: L shoulder 3+/5 ; L elbow, wrist, hand 4-/5   Strength:    -       Right Upper Extremity: Deficits: 3/5  -       Left Upper Extremity: Deficits: 4-/5  Fine Motor Coordination:    -       Intact  Gross motor coordination:   WFL        Treatment & Education:  Pt presents supine in bed and is agreeable to OT evaluation. Pt displays good motivation and participation. She reports that she has scoliosis and DDD. Over the past few months she has had worsening of R LE paraesthesia and new onset of RUE paraesthesia. She presented to the ED twice over the last 2 days with new onset of RLE and RUE weakness which has caused 2 falls and inability to perform ADLs and functional mobility. She presents with decreased BUE/LE strength, endurance, balance, and sensation deficits. At this time she has had a decline for her baseline Independent status and is unable to safely remain at home by herself. She is scheduled to undergo diagnostics to determine the etiology of her symptoms, which will ultimately dictate the treatment plan, but at the time of evaluation pt would benefit from inpatient rehabilitation once her medical needs are met.    Education:    Patient left HOB elevated with all lines intact and call button in reach    GOALS:   Multidisciplinary Problems     Occupational Therapy Goals        Problem: Occupational Therapy Goal    Goal Priority Disciplines Outcome Interventions   Occupational Therapy Goal     OT, PT/OT     Description: Short Term Goals to be met by: 11/2/2020    Patient will increase  functional independence with ADLs by performing:    UE Dressing with Set-up Assistance.  LE Dressing with Moderate Assistance.  Grooming while standing with Stand-by Assistance.  Toileting from bedside commode with Moderate Assistance for hygiene and clothing management.   Bathing from  edge of bed with Minimal Assistance.  Toilet transfer to bedside commode with Minimal Assistance.  Increased functional strength to 4/5 for RUE.    Long Term Goals to be met by: 2020     Patient will increase functional independence with ADLs by performing:    UE Dressing with Modified [place].  LE Dressing with Modified [place].  Grooming while standing with Modified [place].  Toileting from bedside commode with Modified [place] for hygiene and clothing management.   Bathing from  edge of bed with Modified [place].  Toilet transfer to bedside commode with Modified [place].  Increased functional strength to WFL for BUE.                       History:     Past Medical History:   Diagnosis Date    Cirrhosis of liver without mention of alcohol     COPD (chronic obstructive pulmonary disease)     Diabetes mellitus, type 2     Gout, unspecified     Hyperlipidemia     Hypertension     Obesity, unspecified     Unspecified vitamin D deficiency        Past Surgical History:   Procedure Laterality Date     SECTION  , ,  1985     x3    CHOLECYSTECTOMY  2011    COLONOSCOPY N/A 2017    Procedure: COLONOSCOPY;  Surgeon: Luis Bogran-Reyes, MD;  Location: [facility];  Service: Endoscopy;  Laterality: N/A;       Time Tracking:     OT Date of Treatment:   10/29/2020  OT Start Time:  920  OT Stop Time:  950  OT Total Time (min):  30    Billable Minutes:Evaluation 30    Candido oFntanez OT  10/29/2020

## 2020-10-29 NOTE — PLAN OF CARE
Patient ambulating to bedside commode with assist x1, voiding. Tolerating oral intake. No complaints of pain. Participated in PT and OT.

## 2020-10-29 NOTE — CARE UPDATE
10/29/20 1643   Patient Assessment/Suction   Level of Consciousness (AVPU) alert   Respiratory Effort Normal;Unlabored   Expansion/Accessory Muscles/Retractions no use of accessory muscles   Rhythm/Pattern, Respiratory pattern regular   Aerosol Therapy   $ Aerosol Therapy Charges PRN treatment not required   Equipment Change   $ RT Equipment Treatment nebuilzer;Aerosol treatment mask

## 2020-10-29 NOTE — H&P
[facility]  History & Physical    Patient Name: Lisa Hendricks  MRN: [id number]  Admission Date: 10/29/2020  Attending Physician: Rony Garcia III, MD   Primary Care Provider: [facility]         Patient information was obtained from patient and ER records.     Subjective:     Principal Problem:Unilateral weakness    Chief Complaint:   Chief Complaint   Patient presents with    Fall     Pt reports numbness to leg that is chronic. She reports falling this am when transferring. Pt complains of pain to left hip and rib.         HPI: This is a 60-year-old white female history of diabetes and hypertension, presents to the emergency department after sliding out of bed.  Patient denies any pain.  She has a history of right-sided arm and leg weakness for over a month, was seen at an outlying emergency department where she was worked up and told it was due to her blood pressure medication.    The patient later presented to the same emergency department with the following report... The patient is a 60-year-old female, with a past medical history of diabetes, hypertension, liver cirrhosis, and several other medical problems, that presents to the ER for evaluation because of continued right lower extremity weakness.  The patient was evaluated at this facility yesterday morning for similar symptoms, and ultimately had a negative stroke workup.  The patient states that she was essentially unable to get off of her couch, and this evening while trying to ambulate managed to fall.  She specifically denied any head trauma or neck pain.  She did states that she was experiencing some mild left-sided hip discomfort, but did not think that anything was fractured.  Patient states that she has been having progressive right lower extremity and right upper extremity paresthesias and tingling, that she had felt was secondary to blood pressure medicine.  Several weeks ago she had a  blood pressure medicine changed, and reported that overall he has unilateral right-sided symptoms had improved.  She also does admit to a history of scoliosis, and states that she has always had some back discomfort from this chronic disease.  The patient very specifically denies any recent episodes of bowel or bladder incontinence, and denies any saddle anesthesia.  The patient denies any recent illness, fevers, chills, chest pain, shortness of breath, cough, congestion, abdominal pain or other acute negative symptoms    Past Medical History:   Diagnosis Date    Cirrhosis of liver without mention of alcohol     COPD (chronic obstructive pulmonary disease)     Diabetes mellitus, type 2     Gout, unspecified     Hyperlipidemia     Hypertension     Obesity, unspecified     Unspecified vitamin D deficiency        Past Surgical History:   Procedure Laterality Date     SECTION  , ,  1985     x3    CHOLECYSTECTOMY  2011    COLONOSCOPY N/A 2017    Procedure: COLONOSCOPY;  Surgeon: Luis Bogran-Reyes, MD;  Location: [facility];  Service: Endoscopy;  Laterality: N/A;       Review of patient's allergies indicates:   Allergen Reactions    Levemir [insulin detemir] Other (See Comments)     Sob rash to arms and chest  abd cramping diarrhea    Lisinopril Swelling    Shellfish containing products      Other^asthma    Losartan Other (See Comments)     Muscle cramp       No current facility-administered medications on file prior to encounter.      Current Outpatient Medications on File Prior to Encounter   Medication Sig    albuterol (PROVENTIL/VENTOLIN HFA) 90 mcg/actuation inhaler Inhale 2 puffs into the lungs every 6 (six) hours as needed for Wheezing. Rescue    amLODIPine (NORVASC) 5 MG tablet Take 5 mg by mouth once daily.    aspirin 81 MG Chew Take 81 mg by mouth once daily.    azilsartan medoxomiL (EDARBI) 40 mg Tab Take by mouth once daily.    blood sugar diagnostic (BLOOD GLUCOSE  "TEST) Strp 1 strip by Misc.(Non-Drug; Combo Route) route 3 (three) times daily.    chlorthalidone (HYGROTEN) 25 MG Tab Take 25 mg by mouth once daily.    docusate sodium (COLACE) 100 MG capsule Take 100 mg by mouth 3 (three) times a week.     hydrALAZINE (APRESOLINE) 50 MG tablet Take 1 tablet (50 mg total) by mouth 2 (two) times daily.    hydroCHLOROthiazide (MICROZIDE) 12.5 mg capsule Take 12.5 mg by mouth once daily.    insulin aspart (NOVOLOG FLEXPEN) 100 unit/mL InPn pen Inject 40 Units into the skin 3 (three) times daily before meals.    insulin glargine, TOUJEO, (TOUJEO SOLOSTAR U-300 INSULIN) 300 unit/mL (1.5 mL) InPn pen Inject 150 Units into the skin.    insulin needles, disposable, 32 x 1/4 " Ndle 1 Device by Misc.(Non-Drug; Combo Route) route 4 (four) times daily.    LANCETS MISC 1 Act by Misc.(Non-Drug; Combo Route) route 3 (three) times daily.    meclizine (ANTIVERT) 25 mg tablet Take 1 tablet (25 mg total) by mouth 3 (three) times daily as needed for Dizziness ((May take 2 tabs if necessary - watch for drowsiness)).    metoprolol tartrate (LOPRESSOR) 25 MG tablet Take 25 mg by mouth 2 (two) times daily.    silver sulfADIAZINE 1% (SILVADENE) 1 % cream     tetrahydrozoline-zinc (VISINE-AC) 0.05-0.25 % Drop 1 drop 3 (three) times daily as needed.     Family History     Problem Relation (Age of Onset)    Alzheimer's disease Mother    Diabetes Maternal Grandmother    Heart attack Father    Hypertension Mother, Father    Stroke Mother        Tobacco Use    Smoking status: Former Smoker     Quit date: 1979     Years since quittin.7    Smokeless tobacco: Never Used   Substance and Sexual Activity    Alcohol use: No    Drug use: No    Sexual activity: Yes     Partners: Male     Birth control/protection: Surgical     Review of Systems   Constitutional: Positive for activity change. Negative for appetite change, chills and fever.   HENT: Negative for ear pain, mouth sores, sinus " pressure, sinus pain and sore throat.    Eyes: Negative for photophobia and visual disturbance.   Respiratory: Negative for chest tightness, shortness of breath and wheezing.    Cardiovascular: Negative for chest pain, palpitations and leg swelling.   Gastrointestinal: Negative for constipation, diarrhea, nausea and vomiting.   Endocrine: Negative for cold intolerance and heat intolerance.   Genitourinary: Positive for frequency. Negative for difficulty urinating, dysuria, flank pain and urgency.   Musculoskeletal: Positive for myalgias. Negative for arthralgias.   Skin: Positive for wound. Negative for rash.   Neurological: Positive for weakness and numbness. Negative for dizziness, facial asymmetry and speech difficulty.   Psychiatric/Behavioral: Negative for agitation and confusion. The patient is not nervous/anxious.      Objective:     Vital Signs (Most Recent):  Temp: 98 °F (36.7 °C) (10/29/20 0900)  Pulse: 81 (10/29/20 0900)  Resp: 20 (10/29/20 0900)  BP: (!) 156/72 (10/29/20 0900)  SpO2: 97 % (10/29/20 0900) Vital Signs (24h Range):  Temp:  [97.9 °F (36.6 °C)-98.5 °F (36.9 °C)] 98 °F (36.7 °C)  Pulse:  [75-90] 81  Resp:  [18-20] 20  SpO2:  [95 %-99 %] 97 %  BP: (152-199)/(69-86) 156/72     Weight: 108 kg (238 lb)  Body mass index is 40.85 kg/m².    Physical Exam  Vitals signs and nursing note reviewed.   Constitutional:       General: She is not in acute distress.     Appearance: She is obese.   HENT:      Head: Normocephalic and atraumatic.      Nose: Nose normal.      Mouth/Throat:      Mouth: Mucous membranes are moist.      Pharynx: Oropharynx is clear.   Eyes:      Extraocular Movements: EOM normal.   Neck:      Musculoskeletal: Normal range of motion and neck supple.   Cardiovascular:      Rate and Rhythm: Normal rate and regular rhythm.      Pulses: Normal pulses.      Heart sounds: Normal heart sounds. No murmur.   Pulmonary:      Effort: Pulmonary effort is normal.      Breath sounds: Normal breath  sounds. No wheezing, rhonchi or rales.   Abdominal:      General: Bowel sounds are normal.      Palpations: Abdomen is soft.      Tenderness: There is no abdominal tenderness. There is no guarding.   Musculoskeletal:         General: No swelling, tenderness, deformity or signs of injury.      Right lower leg: No edema.      Left lower leg: No edema.   Skin:     General: Skin is warm and dry.      Capillary Refill: Capillary refill takes less than 2 seconds.      Findings: Bruising present.   Neurological:      Mental Status: She is alert and oriented to person, place, and time.      Motor: Weakness present.      Gait: Gait abnormal.      Comments: Mild weakness to R trapezius and RUE. Severe weakness to RLE.   Psychiatric:         Mood and Affect: Mood normal.         Behavior: Behavior normal.         Thought Content: Thought content normal.         Judgment: Judgment normal.           CRANIAL NERVES     CN I  cranial nerve I not tested    CN II   Visual fields full to confrontation.   Right visual field deficit: none  Left visual field deficit: none     CN III, IV, VI   Extraocular motions are normal.   Right pupil: Size: 3 mm. Shape: regular. Reactivity: brisk. Consensual response: intact. Accommodation: intact.   Left pupil: Size: 3 mm. Shape: regular. Reactivity: brisk. Consensual response: intact. Accommodation: intact.   CN III: no CN III palsy  CN VI: no CN VI palsy  Nystagmus: none   Diplopia: none  Ophthalmoparesis: none  Upgaze: normal  Downgaze: normal  Conjugate gaze: present  Vestibulo-ocular reflex: present    CN V   Facial sensation intact.     CN VII   Facial expression full, symmetric.   Right facial weakness: none  Left facial weakness: none  Right taste: normal  Left taste: normal    CN IX, X   CN IX normal.   CN X normal.     CN XI   Right sternocleidomastoid strength: normal  Left sternocleidomastoid strength: normal  Right trapezius strength: weak  Left trapezius strength: normal    CN XII    CN XII normal.        Significant Labs:   CBC:   Recent Labs   Lab 10/28/20  1055 10/29/20  0249   WBC 11.61 12.46   HGB 13.2 13.5   HCT 41.4 42.0    310     CMP:   Recent Labs   Lab 10/28/20  1055 10/29/20  0249    137   K 3.9 3.9    105   CO2 27 26   * 236*   BUN 7 10   CREATININE 0.7 0.9   CALCIUM 9.1 8.9   PROT 8.3 8.3   ALBUMIN 3.9 4.0   BILITOT 0.5 0.6   ALKPHOS 133 133   AST 28 30   ALT 35 36   ANIONGAP 6* 6*   EGFRNONAA >60.0 >60.0     All pertinent labs within the past 24 hours have been reviewed.    Significant Imaging: I have reviewed all pertinent imaging results/findings within the past 24 hours.    Assessment/Plan:     * Unilateral weakness  MRI cervical and lumbar spine. PT/OT evaluation and treatment.      Myopathy  PT/OT evaluation and treatment.      Acute cystitis without hematuria  Continue Macrobid as initiated in ED.        VTE Risk Mitigation (From admission, onward)         Ordered     IP VTE HIGH RISK PATIENT  Once      10/29/20 0626     Place sequential compression device  Until discontinued      10/29/20 0626                   Denise Gaytan NP  Department of [facility]   [facility]

## 2020-10-29 NOTE — NURSING
Patient requesting to resume home insulin and accuchecks. Contacted PANKAJ Gaytan NP awaiting response.

## 2020-10-29 NOTE — PROGRESS NOTES
Physical Therapy  Evaluation    Lisa Hendricks   MRN: [id number]   Admitting Diagnosis: Unilateral weakness                 Billable Minutes:  Evaluation 30 minutes    Diagnosis: Unilateral weakness  Per chart:   Fall       Pt reports numbness to leg that is chronic. She reports falling this am when transferring. Pt complains of pain to left hip and rib.          HPI: This is a 60-year-old white female history of diabetes and hypertension, presents to the emergency department after sliding out of bed.  Patient denies any pain.  She has a history of right-sided arm and leg weakness for over a month, was seen at an [facility] emergency department where she was worked up and told it was due to her blood pressure medication.     The patient later presented to the same emergency department with the following report... The patient is a 60-year-old female, with a past medical history of diabetes, hypertension, liver cirrhosis, and several other medical problems, that presents to the ER for evaluation because of continued right lower extremity weakness.  The patient was evaluated at this facility yesterday morning for similar symptoms, and ultimately had a negative stroke workup.  The patient states that she was essentially unable to get off of her couch, and this evening while trying to ambulate managed to fall.  She specifically denied any head trauma or neck pain.  She did states that she was experiencing some mild left-sided hip discomfort, but did not think that anything was fractured.  Patient states that she has been having progressive right lower extremity and right upper extremity paresthesias and tingling, that she had felt was secondary to blood pressure medicine.  Several weeks ago she had a blood pressure medicine changed, and reported that overall he has unilateral right-sided symptoms had improved.  She also does admit to a history of scoliosis, and states that she has always had some back discomfort from this  chronic disease.  The patient very specifically denies any recent episodes of bowel or bladder incontinence, and denies any saddle anesthesia.  The patient denies any recent illness, fevers, chills, chest pain, shortness of breath, cough, congestion, abdominal pain or other acute negative symptoms      Past Medical History:   Diagnosis Date    Cirrhosis of liver without mention of alcohol     COPD (chronic obstructive pulmonary disease)     Diabetes mellitus, type 2     Gout, unspecified     Hyperlipidemia     Hypertension     Obesity, unspecified     Unspecified vitamin D deficiency       Past Surgical History:   Procedure Laterality Date     SECTION  , ,  1985     x3    CHOLECYSTECTOMY  2011    COLONOSCOPY N/A 2017    Procedure: COLONOSCOPY;  Surgeon: Luis Bogran-Reyes, MD;  [facility]: [facility];  Service: Endoscopy;  Laterality: N/A;       Referring physician: Vanessa  Date referred to PT: 10/29/20    General Precautions: Standard,  fall              Patient History:     DME owned (not currently used): grab bars on bathtub    Previous Level of Function:       Subjective:  Communicated with nurse prior to session.   after    Chief Complaint: numbness and tingling/weakness in R LE; falls at home  Patient goals: to be able to regain R LE strength/functional independence           Objective:         Cognitive Exam:  Oriented to: Person, Place, Time and Situation    Follows Commands/attention: Follows multistep  commands  Communication: clear/fluent  Safety awareness/insight to disability: intact    Physical Exam:  Postural examination/scapula alignment:    -       Rounded shoulders  -       Forward head      Sensation:   Impaired light touch B LE's; pt does report neuropathy B     Lower Extremity Range of Motion:  Right Lower Extremity: decreased AROM <50% at hip; 75% at knee; L ankle limited DF endranage  Left Lower Extremity: WFL    Lower Extremity Strength:  Right  "Lower Extremity: 2-/5 at hip; 3-/5 at knee and ankle  Left Lower Extremity: 4-/5         Gross motor coordination: slowed with ankle circles    Functional Mobility:  Bed Mobility:   -supine to/from sit mod assist trunk control  -scooting to edge of bed max assist; pt kept falling backward    Transfers:   -sit to/from stand from bed mod assist; 1st trial, pt stood and then 'Plopped" down to sit on mattress with post lean; 2nd trial min assist x 2.      Gait:    -pt took 4 steps forward/back using RW with step to pattern,very rigid R LE advancement with decreased step height and therapist spotting R knee for stability    Stairs:  NA    Balance:   Static Sit: FAIR: Maintains without assist, but unable to take any challenges   Dynamic Sit: FAIR: Cannot move trunk without losing balance  Static Stand: POOR+: Needs MINIMAL assist to maintain using RW  Dynamic stand: POOR: Needs MOD (moderate) assist during gait using RW    Therapeutic Activities and Exercises:    Patient left supine with call button in reach.    Assessment:   Lisa Hendricks is a 60 y.o. female with a medical diagnosis of Unilateral weakness and presents with decreased R LE strength, B LE sensory deficits/neuropathy, decreased balance and functional ability with 2 recent falls. Will benefit from rehab to maximize strength and balance/functional mobility as pt lives alone (apparently pt's  works away and is rarely home)    Rehab identified problem list/impairments:      Rehab potential is good.    Activity tolerance: Fair    Discharge recommendations:   rehab    Barriers to discharge:  weakness/falls/needs    Equipment recommendations:   pending progress    GOALS:   Multidisciplinary Problems     Physical Therapy Goals        Problem: Physical Therapy Goal    Goal Priority Disciplines Outcome Goal Variances Interventions   Physical Therapy Goal     PT, PT/OT      Description: Goals to be met by: 11/12/20    Patient will increase functional independence " with mobility by performin. Supine to sit with MInimal Assistance  2. Sit to supine with MInimal Assistance  3. Bed to chair transfer with Minimal Assistance using Rolling Walker  4. Gait  x 50' feet with Minimal Assistance using Rolling Walker.                      PLAN:    Patient to be seen   5-6 days/week to address the above listed problems via  there ex, balance training, transfer and gait training.  Plan of Care expires:  20  Plan of Care reviewed with:  patient          Monica Romero, PT  10/29/2020

## 2020-10-29 NOTE — ED PROVIDER NOTES
Encounter Date: 10/29/2020       History     Chief Complaint   Patient presents with    Fall     Pt reports numbness to leg that is chronic. She reports falling this am when transferring. Pt complains of pain to left hip and rib.      The patient is a 60-year-old female, with a past medical history of diabetes, hypertension, liver cirrhosis, and several other medical problems, that presents to the ER for evaluation because of continued right lower extremity weakness.  The patient was evaluated at this facility yesterday morning for similar symptoms, and ultimately had a negative stroke workup.  The patient states that she was essentially unable to get off of her couch, and this evening while trying to ambulate managed to fall.  She specifically denied any head trauma or neck pain.  She did states that she was experiencing some mild left-sided hip discomfort, but did not think that anything was fractured.  Patient states that she has been having progressive right lower extremity and right upper extremity paresthesias and tingling, that she had felt was secondary to blood pressure medicine.  Several weeks ago she had a blood pressure medicine changed, and reported that overall he has unilateral right-sided symptoms had improved.  She also does admit to a history of scoliosis, and states that she has always had some back discomfort from this chronic disease.  The patient very specifically denies any recent episodes of bowel or bladder incontinence, and denies any saddle anesthesia.  The patient denies any recent illness, fevers, chills, chest pain, shortness of breath, cough, congestion, abdominal pain or other acute negative symptoms    Review of patient's allergies indicates:   Allergen Reactions    Levemir [insulin detemir] Other (See Comments)     Sob rash to arms and chest  abd cramping diarrhea    Lisinopril Swelling    Shellfish containing products      Other^asthma    Losartan Other (See Comments)      Muscle cramp     Past Medical History:   Diagnosis Date    Cirrhosis of liver without mention of alcohol     COPD (chronic obstructive pulmonary disease)     Diabetes mellitus, type 2     Gout, unspecified     Hyperlipidemia     Hypertension     Obesity, unspecified     Unspecified vitamin D deficiency      Past Surgical History:   Procedure Laterality Date     SECTION  , ,  1985     x3    CHOLECYSTECTOMY  2011    COLONOSCOPY N/A 2017    Procedure: COLONOSCOPY;  Surgeon: Luis Bogran-Reyes, MD;  Location: [facility];  Service: Endoscopy;  Laterality: N/A;     Family History   Problem Relation Age of Onset    Alzheimer's disease Mother     Stroke Mother     Hypertension Mother     Heart attack Father     Hypertension Father     Diabetes Maternal Grandmother      Social History     Tobacco Use    Smoking status: Former Smoker     Quit date: 1979     Years since quittin.7    Smokeless tobacco: Never Used   Substance Use Topics    Alcohol use: No    Drug use: No     Review of Systems   All other systems reviewed and are negative.      Physical Exam     Initial Vitals [10/29/20 0227]   BP Pulse Resp Temp SpO2   (!) 199/86 87 18 97.9 °F (36.6 °C) 95 %      MAP       --         Physical Exam    Nursing note and vitals reviewed.  Constitutional: She appears well-developed and well-nourished.   HENT:   Head: Normocephalic and atraumatic.   Eyes: Conjunctivae and EOM are normal. Pupils are equal, round, and reactive to light.   Neck: Normal range of motion. Neck supple. No tracheal deviation present.   Cardiovascular: Normal rate, regular rhythm, normal heart sounds and intact distal pulses.   Pulmonary/Chest: Breath sounds normal. No respiratory distress. She has no wheezes. She has no rhonchi. She has no rales. She exhibits no tenderness.   Abdominal: Soft. Bowel sounds are normal. She exhibits no distension and no mass. There is no abdominal tenderness. There is no  rebound and no guarding.   Musculoskeletal: Normal range of motion. No tenderness or edema.   Neurological: She is alert and oriented to person, place, and time. She has normal strength and normal reflexes. She displays normal reflexes. No cranial nerve deficit or sensory deficit.   4-5 strength associated with right lower extremity; 5/5 strength associated with left lower extremity   Skin: Skin is warm and dry. Capillary refill takes less than 2 seconds.   Psychiatric: She has a normal mood and affect. Her behavior is normal. Judgment and thought content normal.         ED Course   Procedures  Labs Reviewed   AMMONIA - Abnormal; Notable for the following components:       Result Value    Ammonia <10 (*)     All other components within normal limits   CBC W/ AUTO DIFFERENTIAL - Abnormal; Notable for the following components:    Gran # (ANC) 9.4 (*)     Immature Grans (Abs) 0.06 (*)     Gran % 75.6 (*)     Lymph % 15.7 (*)     All other components within normal limits   COMPREHENSIVE METABOLIC PANEL - Abnormal; Notable for the following components:    Glucose 236 (*)     Anion Gap 6 (*)     All other components within normal limits   URINALYSIS, REFLEX TO URINE CULTURE - Abnormal; Notable for the following components:    Specific Gravity, UA >=1.030 (*)     Protein, UA Trace (*)     Ketones, UA 1+ (*)     Leukocytes, UA 2+ (*)     All other components within normal limits    Narrative:     Specimen Source->Urine   URINALYSIS MICROSCOPIC - Abnormal; Notable for the following components:    WBC, UA 29 (*)     All other components within normal limits    Narrative:     Specimen Source->Urine   CULTURE, URINE   ALCOHOL,MEDICAL (ETHANOL)   TROPONIN I   TSH   SARS-COV-2 RNA AMPLIFICATION, QUAL          Imaging Results          CT Abdomen Pelvis With Contrast (In process)                CT Lumbar Spine With Contrast (In process)                X-Ray Chest 1 View (In process)                X-Ray Pelvis Routine AP (In  process)                X-Ray Hip 2 or 3 views Left (In process)                  Medical Decision Making:   ED Management:  The patient's labs are grossly within normal limits, but urinalysis did reveal signs of mild cystitis.  CT scan was performed of the patient's abdomen and pelvis as well as her lumbar spine.  Patient has severe degeneration associated with the lumbar spine with signs of central canal stenosis and thecal sac effacement.  Because of these findings and worsening right lower extremity weakness the patient is being admitted to the hospital for further imaging studies and physical therapy occupational therapy evaluation.  The patient has continued to deny any saddle anesthesia or bowel or bladder incontinence.                             Clinical Impression:       ICD-10-CM ICD-9-CM   1. DDD (degenerative disc disease), lumbar  M51.36 722.52   2. Fall, subsequent encounter  W19.XXXD V58.89     E888.9   3. Cystitis  N30.90 595.9                          ED Disposition Condition    Observation                             Lauri Santiago MD  10/29/20 0515

## 2020-10-30 ENCOUNTER — TELEPHONE (OUTPATIENT)
Dept: NEUROLOGY | Facility: CLINIC | Age: 60
End: 2020-10-30

## 2020-10-30 VITALS
WEIGHT: 238 LBS | HEIGHT: 64 IN | OXYGEN SATURATION: 96 % | BODY MASS INDEX: 40.63 KG/M2 | SYSTOLIC BLOOD PRESSURE: 166 MMHG | TEMPERATURE: 98 F | RESPIRATION RATE: 18 BRPM | HEART RATE: 60 BPM | DIASTOLIC BLOOD PRESSURE: 72 MMHG

## 2020-10-30 LAB
ALBUMIN SERPL BCP-MCNC: 3.5 G/DL (ref 3.5–5.2)
ALP SERPL-CCNC: 107 U/L (ref 55–135)
ALT SERPL W/O P-5'-P-CCNC: 32 U/L (ref 10–44)
ANION GAP SERPL CALC-SCNC: 5 MMOL/L (ref 8–16)
AST SERPL-CCNC: 34 U/L (ref 10–40)
BACTERIA UR CULT: NORMAL
BASOPHILS # BLD AUTO: 0.05 K/UL (ref 0–0.2)
BASOPHILS NFR BLD: 0.5 % (ref 0–1.9)
BILIRUB SERPL-MCNC: 0.5 MG/DL (ref 0.1–1)
BUN SERPL-MCNC: 13 MG/DL (ref 6–20)
CALCIUM SERPL-MCNC: 8.7 MG/DL (ref 8.7–10.5)
CHLORIDE SERPL-SCNC: 106 MMOL/L (ref 95–110)
CO2 SERPL-SCNC: 27 MMOL/L (ref 23–29)
CREAT SERPL-MCNC: 0.9 MG/DL (ref 0.5–1.4)
DIFFERENTIAL METHOD: ABNORMAL
EOSINOPHIL # BLD AUTO: 0.4 K/UL (ref 0–0.5)
EOSINOPHIL NFR BLD: 3.5 % (ref 0–8)
ERYTHROCYTE [DISTWIDTH] IN BLOOD BY AUTOMATED COUNT: 12.7 % (ref 11.5–14.5)
EST. GFR  (AFRICAN AMERICAN): >60 ML/MIN/1.73 M^2
EST. GFR  (NON AFRICAN AMERICAN): >60 ML/MIN/1.73 M^2
GLUCOSE SERPL-MCNC: 205 MG/DL (ref 70–110)
HCT VFR BLD AUTO: 40.7 % (ref 37–48.5)
HGB BLD-MCNC: 12.8 G/DL (ref 12–16)
IMM GRANULOCYTES # BLD AUTO: 0.03 K/UL (ref 0–0.04)
IMM GRANULOCYTES NFR BLD AUTO: 0.3 % (ref 0–0.5)
LYMPHOCYTES # BLD AUTO: 2.3 K/UL (ref 1–4.8)
LYMPHOCYTES NFR BLD: 21.2 % (ref 18–48)
MAGNESIUM SERPL-MCNC: 2.4 MG/DL (ref 1.6–2.6)
MCH RBC QN AUTO: 29.1 PG (ref 27–31)
MCHC RBC AUTO-ENTMCNC: 31.4 G/DL (ref 32–36)
MCV RBC AUTO: 93 FL (ref 82–98)
MONOCYTES # BLD AUTO: 0.8 K/UL (ref 0.3–1)
MONOCYTES NFR BLD: 7.7 % (ref 4–15)
NEUTROPHILS # BLD AUTO: 7.1 K/UL (ref 1.8–7.7)
NEUTROPHILS NFR BLD: 66.8 % (ref 38–73)
NRBC BLD-RTO: 0 /100 WBC
PLATELET # BLD AUTO: 296 K/UL (ref 150–350)
PMV BLD AUTO: 11.2 FL (ref 9.2–12.9)
POCT GLUCOSE: 176 MG/DL (ref 70–110)
POCT GLUCOSE: 199 MG/DL (ref 70–110)
POCT GLUCOSE: 264 MG/DL (ref 70–110)
POTASSIUM SERPL-SCNC: 4.1 MMOL/L (ref 3.5–5.1)
PROT SERPL-MCNC: 7.4 G/DL (ref 6–8.4)
RBC # BLD AUTO: 4.4 M/UL (ref 4–5.4)
SODIUM SERPL-SCNC: 138 MMOL/L (ref 136–145)
WBC # BLD AUTO: 10.63 K/UL (ref 3.9–12.7)

## 2020-10-30 PROCEDURE — 83735 ASSAY OF MAGNESIUM: CPT

## 2020-10-30 PROCEDURE — 25000003 PHARM REV CODE 250: Performed by: NURSE PRACTITIONER

## 2020-10-30 PROCEDURE — 85025 COMPLETE CBC W/AUTO DIFF WBC: CPT

## 2020-10-30 PROCEDURE — 80053 COMPREHEN METABOLIC PANEL: CPT

## 2020-10-30 PROCEDURE — 97530 THERAPEUTIC ACTIVITIES: CPT

## 2020-10-30 PROCEDURE — G0378 HOSPITAL OBSERVATION PER HR: HCPCS

## 2020-10-30 PROCEDURE — 96372 THER/PROPH/DIAG INJ SC/IM: CPT

## 2020-10-30 PROCEDURE — 36415 COLL VENOUS BLD VENIPUNCTURE: CPT

## 2020-10-30 PROCEDURE — 97535 SELF CARE MNGMENT TRAINING: CPT

## 2020-10-30 RX ORDER — NITROFURANTOIN 25; 75 MG/1; MG/1
100 CAPSULE ORAL EVERY 12 HOURS
Qty: 10 CAPSULE | Refills: 0 | Status: SHIPPED | OUTPATIENT
Start: 2020-10-30 | End: 2020-11-04

## 2020-10-30 RX ADMIN — FAMOTIDINE 20 MG: 20 TABLET ORAL at 08:10

## 2020-10-30 RX ADMIN — ASPIRIN 81 MG 81 MG: 81 TABLET ORAL at 08:10

## 2020-10-30 RX ADMIN — INSULIN ASPART 20 UNITS: 100 INJECTION, SOLUTION INTRAVENOUS; SUBCUTANEOUS at 08:10

## 2020-10-30 RX ADMIN — NITROFURANTOIN (MONOHYDRATE/MACROCRYSTALS) 100 MG: 75; 25 CAPSULE ORAL at 08:10

## 2020-10-30 RX ADMIN — METOPROLOL TARTRATE 25 MG: 25 TABLET, FILM COATED ORAL at 08:10

## 2020-10-30 RX ADMIN — AMLODIPINE BESYLATE 5 MG: 5 TABLET ORAL at 08:10

## 2020-10-30 RX ADMIN — INSULIN ASPART 20 UNITS: 100 INJECTION, SOLUTION INTRAVENOUS; SUBCUTANEOUS at 11:10

## 2020-10-30 NOTE — NURSING
Kolby RN, House Supervisor at patient bedside to apply cervical collar as ordered per NP. Pt tolerated well.

## 2020-10-30 NOTE — PLAN OF CARE
Patient has rested well without complaint this shift, up to the bedside commode with assist and fall monitoring maintained via video monitor, will continue to monitor needs

## 2020-10-30 NOTE — UM SECONDARY REVIEW
Physician Advisor Internal    Level of Care Issue    Approved Observation     Physician advisor approves observation level of care. Patient required additional testing and observation due to weakness and falling at home.

## 2020-10-30 NOTE — CARE UPDATE
10/30/20 0827   Patient Assessment/Suction   Level of Consciousness (AVPU) alert   Respiratory Effort Normal;Unlabored   Expansion/Accessory Muscles/Retractions no use of accessory muscles   Rhythm/Pattern, Respiratory pattern regular   Aerosol Therapy   Respiratory Treatment Status (SVN) PRN treatment not required;refused

## 2020-10-30 NOTE — DISCHARGE INSTRUCTIONS
Follow-up with Neurosurgery as soon as possible.  Continue taking antibiotic for UTI for 5 days.  If signs and symptoms worsen or new signs and symptoms develop, call your doctor or go to the ED.

## 2020-10-30 NOTE — PLAN OF CARE
The patient needs to follow up with Neurosurgery ASAP. The patient is aware that I am trying to find a neurosurgeon that accepts Medicaid.    ANASTACIA Walker placed a referral via [facility] for a neurosurgery referral. Urgent.     Places called:    -Dr. Ronnie Sheth in [place]. Not accepting medicaid.     -Dr. Car Oropeza in [place]. No accepting medicaid.     -[facility] in [place]: Not accepting Medicaid.     -Spoke to Veronica with the [facility] scheduling system. She says there are no neurosurgeons that are taking Medicaid at this time.?    -Katja Ferreira: Not accepting Medicaid.     -Dr. Kolby Dee: [phone]: Not accepting Medicaid.     -Dr. Moose Hyman [phone]: Not accepting Medicaid.     -[facility] [phone]: Spoke to Rosalind who told me to call Neurosurgery in [place] at [phone]. This turned out to be in [facility].     -Dr. Gregg Fisher [facility]. [facility] [phone]: Spoke to Lindsey who says she will send a message to the neuro support staff to call me back. Received a call from Leelee who says that this is the wrong department and she will send a message to the surgery department to call me back.     -Dr. Óscar Keller in [place] [facility] [phone]- No answer x 4.     -[facility] in [place] Dr. Brothers [phone]: Does not take insurances. Only works with workers comp and . Not credentialed.     -Will continue searching.     -Dr. Jus Amor [facility] [place] [phone]: Left a message for a call back.     -Dr. Yomi Carmona in [place]. Spine, cranial Neurosurgeon [phone]: Not accepting Medicaid.     -[facility]: Only accepting Medicaid patient's if it has something to do with the brain.     -Will continue searching.     -Dr. Pancho Morillo [place] [facility] [place] [phone]: Does not accept Medicaid.     -Called [facility] in [place], [phone]. Does not accept Medicaid but was told to call [facility] at [phone]. Called this number and it was Scott Ramey. Neurosurgery. Spoke to Meg and told her I needed a neurosurgery referral [facility]. She says they accept Medicaid patient's and to fax the referral and they will call the patient with an appointment. Verified phone number of patient earlier. Faxed referral to [phone]. Notified the patient that someone would be calling her. Gave her the phone number to call them incase they do not call her. Received fax receipt via my email. Did not fax via epic bc I could not find this clinic in [employer] [facility].

## 2020-10-30 NOTE — NURSING
PANKAJ Gaytan NP rounding. New orders noted to discharge patient home.  Will wait for case management to arrange appointment to neurology.  Disk requested from radiology at this time.

## 2020-10-30 NOTE — PLAN OF CARE
10/30/20 1252   Final Note   Assessment Type Final Discharge Note   Anticipated Discharge Disposition Home  (Home with the help of family and neighbors.)   Hospital Follow Up  Appt(s) scheduled?   (Referral sent to [facility]. They will be calling patient with appointment. Patient aware and has number to call them if they do not call her.)   Post-Acute Status   Post-Acute Authorization HME   HME Status Set-up Complete  (Walker delivered to patient.)   Discharge Delays None known at this time

## 2020-10-30 NOTE — PT/OT/SLP PROGRESS
Occupational Therapy   Treatment    Name: Lisa Hendricks  MRN: [id number]  Admitting Diagnosis:  Unilateral weakness       Recommendations:     Discharge Recommendations:  Inpatient Rehabilitation  Discharge Equipment Recommendations:   Transfer tub bench, rolling walker, bedside commode  Barriers to discharge:   medical status, functional status    Assessment:     Lisa Hendricks is a 60 y.o. female with a medical diagnosis of Unilateral weakness. Performance deficits affecting function are bilateral UE/LE weakness with (R) side greater than (L), proximal weakness greater than distal, (R) UE/LE paraesthesia, incoordination, decreased energy for task / endurance, and impaired balance increased self care deficits while decreasing functional mobility.     Rehab Prognosis:  Good; patient would benefit from acute skilled OT services to address these deficits and reach maximum level of function.       Plan:     Patient to be seen   6x/wk to address the above listed problems via  therapeutic activities, therapeutic exercise, neuromuscular reeducation, and ADL/self care management training/  ? Plan of Care Expires:  11/6/2020  ? Plan of Care Reviewed with:  patient, NPP    Subjective     Pain/Comfort:  · Pain Rating 1: 3/10  · Location 1: back  · Pain Addressed 1: Reposition, Cessation of Activity  · Pain Rating Post-Intervention 1: 2/10    Objective:     Communicated with: nurse prior to session.  Patient found supine with   upon OT entry to room.    General Precautions: Standard,     Orthopedic Precautions:    Braces:       Occupational Performance:     Bed Mobility:    · Patient completed Rolling/Turning to Right with minimum assistance  · Patient completed Scooting/Bridging with minimum assistance  · Patient completed Supine to Sit with minimum assistance  · Patient completed Sit to Supine with minimum assistance     Functional Mobility/Transfers:  · Patient completed Sit <> Stand Transfer with minimum assistance  with   rolling walker   · Patient completed Toilet Transfer Step Transfer technique with minimum assistance with  rolling walker and bedside commode  · Functional Mobility: Pt ambulated 12 feet x3 trials with min assist utilizing RW.    Activities of Daily Living:  · Toileting: moderate assistance secondary to some assist with clothing management       Treatment & Education:  Pt was cooperative and motivated without verbal encouragement while exhibiting positive affect. She performed bed mobility requiring min assist secondary to stabilization of trunk during supine <> sit transitions with additional cueing. Pt participated in functional transfer retraining to / from bed and BSC emphasizing fall prevention utilizing RW providing extra time requiring min assist secondary to mostly steadying assist with mod verbal and tactile cueing for safety and technique. Also, she participated in ADL retraining regarding toileting emphasizing safety awareness and fall prevention providing extra time requiring mod assist secondary to some assist to pull / adjust clothing to and from waist on this date. However, she demonstrated improved functional performance with transfers as noted by min assist in which she did not require lifting assist on this date.    Patient left supine with call button in reachEducation:      GOALS:   Multidisciplinary Problems     Occupational Therapy Goals        Problem: Occupational Therapy Goal    Goal Priority Disciplines Outcome Interventions   Occupational Therapy Goal     OT, PT/OT     Description: Short Term Goals to be met by: 11/2/2020    Patient will increase functional independence with ADLs by performing:    UE Dressing with Set-up Assistance.  LE Dressing with Moderate Assistance.  Grooming while standing with Stand-by Assistance.  Toileting from bedside commode with Moderate Assistance for hygiene and clothing management.   Bathing from  edge of bed with Minimal Assistance.  Toilet transfer to  bedside commode with Minimal Assistance.  Increased functional strength to 4/5 for RUE.    Long Term Goals to be met by: 11/6/2020     Patient will increase functional independence with ADLs by performing:    UE Dressing with Modified [place].  LE Dressing with Modified [place].  Grooming while standing with Modified [place].  Toileting from bedside commode with Modified [place] for hygiene and clothing management.   Bathing from  edge of bed with Modified [place].  Toilet transfer to bedside commode with Modified [place].  Increased functional strength to WFL for BUE.                       Time Tracking:     OT Date of Treatment: 10/30/20  OT Start Time: 1010  OT Stop Time: 1040  OT Total Time (min): 30 min    Billable Minutes:Self Care/Home Management 15  Therapeutic Activity 15    Mihir Jacobsen OT  10/30/2020

## 2020-10-30 NOTE — PROGRESS NOTES
Written discharge and follow up instructions provided to patient. Questions answered, patient verbalizes understanding. She denies further complaints or needs at this time. She is instructed to call for a wheelchair once her ride arrives and she is ready to leave.  Bed low, srx2, items & call light in reach.

## 2020-10-30 NOTE — DISCHARGE SUMMARY
[facility]  Discharge Summary      Patient Name: Lisa Hendricks  MRN: [id number]  Admission Date: 10/29/2020  Hospital Length of Stay: 0 days  Discharge Date and Time:  10/30/2020 12:33 PM  Attending Physician: Rony Garcia III, MD   Discharging Provider: Denise Gaytan NP  Primary Care Provider: [facility]      HPI:   This is a 60-year-old white female history of diabetes and hypertension, presents to the emergency department after sliding out of bed.  Patient denies any pain.  She has a history of right-sided arm and leg weakness for over a month, was seen at an outlying emergency department where she was worked up and told it was due to her blood pressure medication.    The patient later presented to the same emergency department with the following report... The patient is a 60-year-old female, with a past medical history of diabetes, hypertension, liver cirrhosis, and several other medical problems, that presents to the ER for evaluation because of continued right lower extremity weakness.  The patient was evaluated at this facility yesterday morning for similar symptoms, and ultimately had a negative stroke workup.  The patient states that she was essentially unable to get off of her couch, and this evening while trying to ambulate managed to fall.  She specifically denied any head trauma or neck pain.  She did states that she was experiencing some mild left-sided hip discomfort, but did not think that anything was fractured.  Patient states that she has been having progressive right lower extremity and right upper extremity paresthesias and tingling, that she had felt was secondary to blood pressure medicine.  Several weeks ago she had a blood pressure medicine changed, and reported that overall he has unilateral right-sided symptoms had improved.  She also does admit to a history of scoliosis, and states that she has always had some back discomfort  from this chronic disease.  The patient very specifically denies any recent episodes of bowel or bladder incontinence, and denies any saddle anesthesia.  The patient denies any recent illness, fevers, chills, chest pain, shortness of breath, cough, congestion, abdominal pain or other acute negative symptoms    * No surgery found *      Hospital Course:   10/29/2020 SD: Continue macrobid as initiated in ER for treatment of UTI. Pt reports numbness and weakness have gradually worsened - MRI cervical and lumbar spine pending. PT/ and OT evaluation and treatment. SCD and famotidine for DVT and GI proph.  10/30/2020 SD: MRI c-spine indicates a broad-based disc or uncinate proliferation and questionable minimal cord compression at C5-6. Discussed findings with Dr. Chen. Plan to D/C home with c-collar and neurosurgery follow-up as soon as possible. Continue taking Macrobid for UTI.     Consults:   Consults (From admission, onward)        Status Ordering Provider     Inpatient consult to Social Work/Case Management  Once     Provider:  (Not yet assigned)    Acknowledged INGRID FLOWERS     Inpatient consult to Social Work/Case Management  Once     Provider:  (Not yet assigned)    Ordered GEETHA CHEN III          * Unilateral weakness  Discharge home with c-collar and neurosurgeon follow-up as soon as possible.      Myopathy  Neurosurgeon follow-up as soon as possible      Acute cystitis without hematuria  Continue Macrobid as initiated in ED.        Final Active Diagnoses:    Diagnosis Date Noted POA    PRINCIPAL PROBLEM:  Unilateral weakness [R53.1] 10/29/2020 Unknown    Myopathy [G72.9] 10/29/2020 Yes    Acute cystitis without hematuria [N30.00] 10/29/2020 Yes      Problems Resolved During this Admission:       Discharged Condition: fair    Disposition: Home or Self Care    Follow Up:  Contact information for after-discharge care     Durable Medical Equipment     [facility] .    Service: Durable Medical  "Equipment  Contact information:  [place] [id number]                     Patient Instructions:      WALKER FOR HOME USE     Order Specific Question Answer Comments   Type of Walker: Adult (5'4"-6'6")    With wheels? Yes    Height: 5' 4" (1.626 m)    Weight: 108 kg (238 lb)    Length of need (1-99 months): 99    Please check all that apply: Patient's condition impairs ambulation.    Please check all that apply: Patient needs help to get in and out of chair.    Please check all that apply: Walker will be used for gait training.    Please check all that apply: Altered sensory perception.    Please check all that apply: Patient is unable to safely ambulate without equipment.      Ambulatory referral/consult to Neurosurgery   Standing Status: Future   Referral Priority: Urgent Referral Type: Consultation   Referral Reason: Specialty Services Required   Requested Specialty: Neurosurgery   Number of Visits Requested: 1     Diet diabetic     Notify your health care provider if you experience any of the following:  increased confusion or weakness     Notify your health care provider if you experience any of the following:  persistent dizziness, light-headedness, or visual disturbances     Notify your health care provider if you experience any of the following:  worsening rash     Notify your health care provider if you experience any of the following:  severe persistent headache     Notify your health care provider if you experience any of the following:  difficulty breathing or increased cough     Notify your health care provider if you experience any of the following:  redness, tenderness, or signs of infection (pain, swelling, redness, odor or green/yellow discharge around incision site)     Notify your health care provider if you experience any of the following:  severe uncontrolled pain     Notify your health care provider if you experience any of the following:  persistent nausea and vomiting or " "diarrhea     Notify your health care provider if you experience any of the following:  temperature >100.4     Activity as tolerated       Significant Diagnostic Studies: Labs: All labs within the past 24 hours have been reviewed    Pending Diagnostic Studies:     None         Medications:  Reconciled Home Medications:      Medication List      START taking these medications    nitrofurantoin (macrocrystal-monohydrate) 100 MG capsule  Commonly known as: MACROBID  Take 1 capsule (100 mg total) by mouth every 12 (twelve) hours. for 5 days        CONTINUE taking these medications    BLOOD GLUCOSE TEST Strp  Generic drug: blood sugar diagnostic  1 strip by Misc.(Non-Drug; Combo Route) route 3 (three) times daily.     EDARBI 40 mg Tab  Generic drug: azilsartan medoxomiL  Take by mouth once daily.     hydrALAZINE 50 MG tablet  Commonly known as: APRESOLINE  Take 1 tablet (50 mg total) by mouth 2 (two) times daily.     LANCETS MISC  1 Act by Misc.(Non-Drug; Combo Route) route 3 (three) times daily.     meclizine 25 mg tablet  Commonly known as: ANTIVERT  Take 1 tablet (25 mg total) by mouth 3 (three) times daily as needed for Dizziness ((May take 2 tabs if necessary - watch for drowsiness)).     pen needle, diabetic 32 gauge x 1/4" Ndle  1 Device by Misc.(Non-Drug; Combo Route) route 4 (four) times daily.     silver sulfADIAZINE 1% 1 % cream  Commonly known as: SILVADENE     tetrahydrozoline-zinc 0.05-0.25 % Drop  Commonly known as: VISINE-AC  1 drop 3 (three) times daily as needed.        ASK your doctor about these medications    albuterol 90 mcg/actuation inhaler  Commonly known as: PROVENTIL/VENTOLIN HFA  Inhale 2 puffs into the lungs every 6 (six) hours as needed for Wheezing. Rescue     amLODIPine 5 MG tablet  Commonly known as: NORVASC  Take 5 mg by mouth once daily.     aspirin 81 MG Chew  Take 81 mg by mouth once daily.     chlorthalidone 25 MG Tab  Commonly known as: HYGROTEN  Take 25 mg by mouth once daily.   "   docusate sodium 100 MG capsule  Commonly known as: COLACE  Take 100 mg by mouth 3 (three) times a week.     hydroCHLOROthiazide 12.5 mg capsule  Commonly known as: MICROZIDE  Take 12.5 mg by mouth once daily.     insulin aspart U-100 100 unit/mL (3 mL) Inpn pen  Commonly known as: NovoLOG Flexpen U-100 Insulin  Inject 40 Units into the skin 3 (three) times daily before meals.     metoprolol tartrate 25 MG tablet  Commonly known as: LOPRESSOR  Take 25 mg by mouth 2 (two) times daily.     TOUJEO SOLOSTAR U-300 INSULIN 300 unit/mL (1.5 mL) Inpn pen  Generic drug: insulin glargine (TOUJEO)  Inject 150 Units into the skin.            Indwelling Lines/Drains at time of discharge:   Lines/Drains/Airways     None                 Time spent on the discharge of patient: 30 minutes  Patient was seen and examined on the date of discharge and determined to be suitable for discharge.         Denise Gatyan NP  Department of [facility]  [facility]

## 2020-10-30 NOTE — TELEPHONE ENCOUNTER
Called and spoke with Karen. Pt is in emergent need for neurosurgery. Explained will forward to Neurosurgery.

## 2020-10-30 NOTE — TELEPHONE ENCOUNTER
----- Message from Lindsey Tobin sent at 10/30/2020 11:10 AM CDT -----  Contact: Karen # [phone]  Karen at the [place] location needs an Emergency Appt for the pt. She is in the hospital for abnormal  MRI on her spine.

## 2020-10-30 NOTE — PLAN OF CARE
10/30/20 1038   Post-Acute Status   Post-Acute Authorization HME   HME Status Pending Delivery Hospital  (Referral sent to Nicki with [facility] for rolling walker. Patient agrees wtih this. Nicki says she will dispatch the walker for delivery. No ETA.)

## 2020-10-30 NOTE — PLAN OF CARE
Pt has received information regarding neurosurgeon since the neurosurgeon's office will be calling her to scheduled an appointment. Patient has denied significant complaints of pain. She verbalizes understanding of course and plan of care. She denies further complaints or needs at this time. Bed low, srx2, items & call light in reach.

## 2020-10-31 ENCOUNTER — NURSE TRIAGE (OUTPATIENT)
Dept: ADMINISTRATIVE | Facility: CLINIC | Age: 60
End: 2020-10-31

## 2020-10-31 NOTE — TELEPHONE ENCOUNTER
Spoke with ex : pt with neurosx referral in process. Recent discharge from hospital.     Today cannot move right leg. And right  Arm is shaking. was able to stand and get into care yesterday. Was able(very slowly) to ambulate at 4 am. Now cannot use the leg at all and right arm with shaking. instructed to call 911 verbalizes understanding.     Reason for Disposition   [1] SEVERE weakness (i.e., unable to walk or barely able to walk, requires support) AND [2] new onset or worsening    Protocols used: NEUROLOGIC DEFICIT-A-AH

## 2020-11-02 ENCOUNTER — HOSPITAL ENCOUNTER (EMERGENCY)
Facility: HOSPITAL | Age: 60
Discharge: HOME | End: 2020-11-02
Attending: EMERGENCY MEDICINE
Payer: MEDICAID

## 2020-11-02 ENCOUNTER — NURSE TRIAGE (OUTPATIENT)
Dept: ADMINISTRATIVE | Facility: CLINIC | Age: 60
End: 2020-11-02

## 2020-11-02 VITALS
DIASTOLIC BLOOD PRESSURE: 65 MMHG | WEIGHT: 238 LBS | HEART RATE: 80 BPM | OXYGEN SATURATION: 97 % | TEMPERATURE: 99 F | BODY MASS INDEX: 40.63 KG/M2 | SYSTOLIC BLOOD PRESSURE: 144 MMHG | HEIGHT: 64 IN | RESPIRATION RATE: 18 BRPM

## 2020-11-02 DIAGNOSIS — R29.898 WEAKNESS OF RIGHT LOWER EXTREMITY: Primary | ICD-10-CM

## 2020-11-02 DIAGNOSIS — R53.1 WEAKNESS: ICD-10-CM

## 2020-11-02 LAB
ALBUMIN SERPL BCP-MCNC: 3.6 G/DL (ref 3.5–5.2)
ALP SERPL-CCNC: 111 U/L (ref 55–135)
ALT SERPL W/O P-5'-P-CCNC: 34 U/L (ref 10–44)
ANION GAP SERPL CALC-SCNC: 9 MMOL/L (ref 8–16)
APTT BLDCRRT: 24.2 SEC (ref 21–32)
AST SERPL-CCNC: 23 U/L (ref 10–40)
BACTERIA #/AREA URNS HPF: NEGATIVE /HPF
BASOPHILS # BLD AUTO: 0.05 K/UL (ref 0–0.2)
BASOPHILS NFR BLD: 0.5 % (ref 0–1.9)
BILIRUB SERPL-MCNC: 0.4 MG/DL (ref 0.1–1)
BILIRUB UR QL STRIP: NEGATIVE
BUN SERPL-MCNC: 13 MG/DL (ref 6–20)
CALCIUM SERPL-MCNC: 8.7 MG/DL (ref 8.7–10.5)
CHLORIDE SERPL-SCNC: 106 MMOL/L (ref 95–110)
CLARITY UR: ABNORMAL
CO2 SERPL-SCNC: 25 MMOL/L (ref 23–29)
COLOR UR: YELLOW
CREAT SERPL-MCNC: 0.8 MG/DL (ref 0.5–1.4)
DIFFERENTIAL METHOD: ABNORMAL
EOSINOPHIL # BLD AUTO: 0.1 K/UL (ref 0–0.5)
EOSINOPHIL NFR BLD: 1.2 % (ref 0–8)
ERYTHROCYTE [DISTWIDTH] IN BLOOD BY AUTOMATED COUNT: 12.7 % (ref 11.5–14.5)
EST. GFR  (AFRICAN AMERICAN): >60 ML/MIN/1.73 M^2
EST. GFR  (NON AFRICAN AMERICAN): >60 ML/MIN/1.73 M^2
GLUCOSE SERPL-MCNC: 178 MG/DL (ref 70–110)
GLUCOSE UR QL STRIP: NEGATIVE
HCT VFR BLD AUTO: 41.1 % (ref 37–48.5)
HGB BLD-MCNC: 13.1 G/DL (ref 12–16)
HGB UR QL STRIP: NEGATIVE
HYALINE CASTS #/AREA URNS LPF: 23 /LPF
IMM GRANULOCYTES # BLD AUTO: 0.03 K/UL (ref 0–0.04)
IMM GRANULOCYTES NFR BLD AUTO: 0.3 % (ref 0–0.5)
INR PPP: 1.1 (ref 0.8–1.2)
KETONES UR QL STRIP: ABNORMAL
LEUKOCYTE ESTERASE UR QL STRIP: ABNORMAL
LYMPHOCYTES # BLD AUTO: 1.7 K/UL (ref 1–4.8)
LYMPHOCYTES NFR BLD: 15.7 % (ref 18–48)
MCH RBC QN AUTO: 29.4 PG (ref 27–31)
MCHC RBC AUTO-ENTMCNC: 31.9 G/DL (ref 32–36)
MCV RBC AUTO: 92 FL (ref 82–98)
MICROSCOPIC COMMENT: ABNORMAL
MONOCYTES # BLD AUTO: 0.7 K/UL (ref 0.3–1)
MONOCYTES NFR BLD: 6.4 % (ref 4–15)
NEUTROPHILS # BLD AUTO: 8.4 K/UL (ref 1.8–7.7)
NEUTROPHILS NFR BLD: 75.9 % (ref 38–73)
NITRITE UR QL STRIP: NEGATIVE
NRBC BLD-RTO: 0 /100 WBC
PH UR STRIP: 7 [PH] (ref 5–8)
PLATELET # BLD AUTO: 309 K/UL (ref 150–350)
PMV BLD AUTO: 11.2 FL (ref 9.2–12.9)
POTASSIUM SERPL-SCNC: 3.7 MMOL/L (ref 3.5–5.1)
PROT SERPL-MCNC: 7.5 G/DL (ref 6–8.4)
PROT UR QL STRIP: ABNORMAL
PROTHROMBIN TIME: 11.1 SEC (ref 9–12.5)
RBC # BLD AUTO: 4.45 M/UL (ref 4–5.4)
RBC #/AREA URNS HPF: 4 /HPF (ref 0–4)
SODIUM SERPL-SCNC: 140 MMOL/L (ref 136–145)
SP GR UR STRIP: 1.02 (ref 1–1.03)
SQUAMOUS #/AREA URNS HPF: 4 /HPF
TROPONIN I SERPL DL<=0.01 NG/ML-MCNC: <0.02 NG/ML (ref 0–0.03)
URN SPEC COLLECT METH UR: ABNORMAL
UROBILINOGEN UR STRIP-ACNC: NEGATIVE EU/DL
WBC # BLD AUTO: 11.01 K/UL (ref 3.9–12.7)
WBC #/AREA URNS HPF: 71 /HPF (ref 0–5)

## 2020-11-02 PROCEDURE — 85610 PROTHROMBIN TIME: CPT

## 2020-11-02 PROCEDURE — 93005 ELECTROCARDIOGRAM TRACING: CPT

## 2020-11-02 PROCEDURE — 93010 EKG 12-LEAD: ICD-10-PCS | Mod: ,,, | Performed by: INTERNAL MEDICINE

## 2020-11-02 PROCEDURE — 85730 THROMBOPLASTIN TIME PARTIAL: CPT

## 2020-11-02 PROCEDURE — 84484 ASSAY OF TROPONIN QUANT: CPT

## 2020-11-02 PROCEDURE — 85025 COMPLETE CBC W/AUTO DIFF WBC: CPT

## 2020-11-02 PROCEDURE — 80053 COMPREHEN METABOLIC PANEL: CPT

## 2020-11-02 PROCEDURE — 36415 COLL VENOUS BLD VENIPUNCTURE: CPT

## 2020-11-02 PROCEDURE — 93010 ELECTROCARDIOGRAM REPORT: CPT | Mod: ,,, | Performed by: INTERNAL MEDICINE

## 2020-11-02 PROCEDURE — 25500020 PHARM REV CODE 255: Performed by: EMERGENCY MEDICINE

## 2020-11-02 PROCEDURE — 99285 EMERGENCY DEPT VISIT HI MDM: CPT | Mod: 25

## 2020-11-02 PROCEDURE — 81000 URINALYSIS NONAUTO W/SCOPE: CPT

## 2020-11-02 RX ADMIN — IOHEXOL 100 ML: 350 INJECTION, SOLUTION INTRAVENOUS at 07:11

## 2020-11-02 RX ADMIN — IOHEXOL 100 ML: 350 INJECTION, SOLUTION INTRAVENOUS at 08:11

## 2020-11-02 NOTE — ED NOTES
Patient pllaced back in bed after guerline, tech helped her to the wheelchair for discharge. Patient unable to get in touch with ride. Patient will continue to try.

## 2020-11-02 NOTE — ED NOTES
NEUROLOGICAL:   Patient is awake , alert  and oriented x 4 . Pupils are PERRL.   Moves all extremities without difficulty.   Patient reports no neuro complaints..  GCS 15    HEENT:   Head appears normocephalic  and symmetric .   Eyes appear WNL to both eyes. Patient reports no complaints  to both eyes .   Ears appear WNL. Patient reports no complaints  to both ears.   Nares appear patent . Patient reports no nose complaints .  Mouth appears moist, pink and teeth intact. Patient reports no mouth complaints.   Throat appears pink and moist . Patient reports no throat complaints.    CARDIOVASCULAR:   S1 and S2 present, no murmurs, gallops, or rubs, rate regular  and pulses palpable (2+)    On palpation no edema noted , noted to none.   Patient reports no CV complaints.  .   Patient vitals are WNL.    RESPIRATORY:   Airway Clear, Open, and Patent.  Respirations are even and unlabored.   Breath sounds clear  to all lung fields.   Patient reports no respiratory complaints.     GASTROINTESTINAL:   Abdomen is soft  and non-tender x 4 quadrants. Bowel sounds are normoactive to all quadrants .   Patient reports no GI complaints .     GENITOURINARY:   Patient reports no  complaints.     MUSCULOSKELETAL:   weakness noted to RUE and weakness noted to RLE.       SKIN:   Skin appears warm , dry , good turgor, color normal for race and intact. Patient reports no skin complaints.

## 2020-11-02 NOTE — ED PROVIDER NOTES
Encounter Date: 2020       History     Chief Complaint   Patient presents with    Extremity Weakness     pt reports that she can't move her right arm or leg, onset 3 weeks ago     The patient is a 52-year-old female, with a myriad of medical problems, that presents to the ER for evaluation because of worsening right upper extremity right lower extremity weakness.  The patient has been experiencing right-sided unilateral weakness over the last several weeks, and was admitted to this facility several days ago for further assessment and evaluation, and that included MRI imaging of her cervical and lumbar spine.  The imaging studies ultimately did reveal findings concerning for areas of compression, that were contributing to the patient's right-sided weakness.  The patient was set up with outpatient neurosurgery follow-up, as well as occupational therapy.  The patient states that since discharge she has experiencing worsening right leg weakness and right arm weakness.  She denies any bowel or bladder incontinence, chest pain or shortness of breath, fevers, chills or other acute negative symptoms at this time        Review of patient's allergies indicates:   Allergen Reactions    Levemir [insulin detemir] Other (See Comments)     Sob rash to arms and chest  abd cramping diarrhea    Lisinopril Swelling    Shellfish containing products      Other^asthma    Losartan Other (See Comments)     Muscle cramp     Past Medical History:   Diagnosis Date    Cirrhosis of liver without mention of alcohol     COPD (chronic obstructive pulmonary disease)     Diabetes mellitus, type 2     Gout, unspecified     Hyperlipidemia     Hypertension     Obesity, unspecified     Unspecified vitamin D deficiency      Past Surgical History:   Procedure Laterality Date     SECTION  , ,  1985     x3    CHOLECYSTECTOMY  2011    COLONOSCOPY N/A 2017    Procedure: COLONOSCOPY;  Surgeon: Luis Bogran-Reyes,  MD;  Location: [facility];  Service: Endoscopy;  Laterality: N/A;     Family History   Problem Relation Age of Onset    Alzheimer's disease Mother     Stroke Mother     Hypertension Mother     Heart attack Father     Hypertension Father     Diabetes Maternal Grandmother      Social History     Tobacco Use    Smoking status: Former Smoker     Quit date: 1979     Years since quittin.7    Smokeless tobacco: Never Used   Substance Use Topics    Alcohol use: No    Drug use: No     Review of Systems   All other systems reviewed and are negative.      Physical Exam     Initial Vitals [20 0340]   BP Pulse Resp Temp SpO2   (!) 188/79 83 16 98.6 °F (37 °C) 98 %      MAP       --         Physical Exam    Nursing note and vitals reviewed.  Constitutional: She appears well-developed and well-nourished.   HENT:   Head: Normocephalic and atraumatic.   Eyes: Conjunctivae and EOM are normal. Pupils are equal, round, and reactive to light.   Neck: Normal range of motion. Neck supple. No tracheal deviation present.   Cardiovascular: Normal rate, regular rhythm, normal heart sounds and intact distal pulses.   Pulmonary/Chest: Breath sounds normal. No respiratory distress. She has no wheezes. She has no rhonchi. She has no rales. She exhibits no tenderness.   Abdominal: Soft. Bowel sounds are normal. She exhibits no distension and no mass. There is no abdominal tenderness. There is no rebound and no guarding.   Musculoskeletal: Normal range of motion. No tenderness or edema.   Neurological: She is alert and oriented to person, place, and time. She has normal strength. She displays abnormal reflex. A sensory deficit is present. No cranial nerve deficit.   Right lower extremity:  2/5 strength, patellar DTRs hyper reflexive    Right upper extremity: 3-4/5 strength, with no appreciable sensory deficits   Skin: Skin is warm and dry. Capillary refill takes less than 2 seconds.   Psychiatric: She has a normal mood and  affect. Her behavior is normal. Judgment and thought content normal.         ED Course   Procedures  Labs Reviewed   CBC W/ AUTO DIFFERENTIAL - Abnormal; Notable for the following components:       Result Value    MCHC 31.9 (*)     Gran # (ANC) 8.4 (*)     Gran % 75.9 (*)     Lymph % 15.7 (*)     All other components within normal limits   COMPREHENSIVE METABOLIC PANEL - Abnormal; Notable for the following components:    Glucose 178 (*)     All other components within normal limits   URINALYSIS - Abnormal; Notable for the following components:    Appearance, UA Cloudy (*)     Protein, UA 1+ (*)     Ketones, UA Trace (*)     Leukocytes, UA 3+ (*)     All other components within normal limits   URINALYSIS MICROSCOPIC - Abnormal; Notable for the following components:    WBC, UA 71 (*)     Hyaline Casts, UA 23 (*)     All other components within normal limits   APTT   PROTIME-INR   TROPONIN I        ECG Results          EKG 12-lead (In process)  Result time 11/02/20 06:51:43    In process by Interface, Lab In [facility] (11/02/20 06:51:43)                 Narrative:    Test Reason : R53.1,    Vent. Rate : 073 BPM     Atrial Rate : 073 BPM     P-R Int : 156 ms          QRS Dur : 080 ms      QT Int : 374 ms       P-R-T Axes : 057 027 -44 degrees     QTc Int : 412 ms    Normal sinus rhythm  Nonspecific T wave abnormality  Abnormal ECG  When compared with ECG of 28-DEC-2018 12:12,  Nonspecific T wave abnormality, worse in Inferior leads    Referred By: [email or web address]   SELF           Confirmed By:                             Imaging Results          CTA Neck (Final result)  Result time 11/02/20 08:49:12    Final result by Car Landaverde MD (11/02/20 08:49:12)                 Impression:      Patent extra and intracranial arterial system with no stenosis or aneurysm.      Electronically signed by: Car Landaverde MD  Date:    11/02/2020  Time:    08:49             Narrative:    EXAMINATION:  CTA HEAD; CTA NECK    CLINICAL  HISTORY:  Stroke, follow up;    TECHNIQUE:  Thin section post IV contrast images were obtained through the cervical and cerebral arterial vasculature.  Multiplanar reformations were performed.  Images were reviewed at 3-D workstation as well.  Iterative reconstruction technique was used.   CT/cardiac nuclear exam/s in prior 12 months: 3.    COMPARISON:  CT head without IV contrast 11/02/2020.    FINDINGS:  Visualized portion of thoracic aortic arch demonstrates no aneurysm or dissection.    There is mild atherosclerotic plaque at the right common carotid artery bifurcation.  The right common, internal and external carotid arteries are intact and patent.    The left common, internal and external carotid arteries are intact and patent.  Small focus of calcified plaque at the bifurcation of the left common carotid artery.    The right and left vertebral arteries appear intact and patent.    Intracranial portion of the right internal carotid artery demonstrates atherosclerotic disease at the cavernous segment with no stenosis or aneurysm.  The right middle and right anterior cerebral arteries are patent with no stenosis or aneurysm.  The intracranial portion of left internal carotid artery demonstrates atherosclerotic disease at the cavernous segment with no stenosis or aneurysm.  The left middle and left anterior cerebral arteries are patent with no stenosis or aneurysm.  The basilar artery is patent with no stenosis or aneurysm.  The right posterior communicating artery is patent without aneurysm.    Soft tissues of the neck appear unremarkable.    No masses or abnormal enhancement in the brain parenchyma.                               CTA Brain (Final result)  Result time 11/02/20 08:49:12    Final result by Car Landaverde MD (11/02/20 08:49:12)                 Impression:      Patent extra and intracranial arterial system with no stenosis or aneurysm.      Electronically signed by: Car Landaverde  MD  Date:    11/02/2020  Time:    08:49             Narrative:    EXAMINATION:  CTA HEAD; CTA NECK    CLINICAL HISTORY:  Stroke, follow up;    TECHNIQUE:  Thin section post IV contrast images were obtained through the cervical and cerebral arterial vasculature.  Multiplanar reformations were performed.  Images were reviewed at 3-D workstation as well.  Iterative reconstruction technique was used.   CT/cardiac nuclear exam/s in prior 12 months: 3.    COMPARISON:  CT head without IV contrast 11/02/2020.    FINDINGS:  Visualized portion of thoracic aortic arch demonstrates no aneurysm or dissection.    There is mild atherosclerotic plaque at the right common carotid artery bifurcation.  The right common, internal and external carotid arteries are intact and patent.    The left common, internal and external carotid arteries are intact and patent.  Small focus of calcified plaque at the bifurcation of the left common carotid artery.    The right and left vertebral arteries appear intact and patent.    Intracranial portion of the right internal carotid artery demonstrates atherosclerotic disease at the cavernous segment with no stenosis or aneurysm.  The right middle and right anterior cerebral arteries are patent with no stenosis or aneurysm.  The intracranial portion of left internal carotid artery demonstrates atherosclerotic disease at the cavernous segment with no stenosis or aneurysm.  The left middle and left anterior cerebral arteries are patent with no stenosis or aneurysm.  The basilar artery is patent with no stenosis or aneurysm.  The right posterior communicating artery is patent without aneurysm.    Soft tissues of the neck appear unremarkable.    No masses or abnormal enhancement in the brain parenchyma.                               CT Head Without Contrast (Final result)  Result time 11/02/20 08:29:03    Final result by Car Landaverde MD (11/02/20 08:29:03)                 Impression:      1. Chronic  ischemic disease.  2. No acute intracranial hemorrhage, mass or CT evidence of an acute infarct.  3.  A preliminary report was faxed by [facility] shortly after completion of this examination.      Electronically signed by: Car Landaverde MD  Date:    11/02/2020  Time:    08:29             Narrative:    EXAMINATION:  CT HEAD WITHOUT CONTRAST    CLINICAL HISTORY:  unilateral weakness;    TECHNIQUE:  Axial CT images were obtained. Iterative reconstruction technique was used.  CT/cardiac nuclear exam/s in prior 12 months: 3.    COMPARISON:  CT head without IV contrast 10/28/2020.    FINDINGS:  Patchy areas of low attenuation in the cerebral white matter bilaterally with old small infarcts in the medial aspect of the left frontoparietal region.  Chronic ischemic disease in the left aspect of the dionisio.  No acute intracranial hemorrhage.  No mass or mass effect.  No ventricular dilatation or abnormal extra-axial fluid collection.  No osseous abnormality.  Visualized paranasal sinuses and mastoids are clear.                                 Medical Decision Making:   ED Management:  Patient's CT scan of her head did reveal small hyperdensities ever slightly more prominent than her previous exam.  Ultimately this was discussed with vascular Neurology, who requested a CT of the patient's head and neck for further assessment evaluation.  These imaging studies will be discussed with vascular Neurology and disposition and transfer or also be discussed.    Care assumed from Dr Santiago at 0600, due to shift change. CTA head/neck pending per vascular neurology request. Discussed with Dr Fuller, studies returned WNL, no emergent need for neurology transfer. Problem has been ongoing for several weeks and patient has already undergone MR spine with some compressive findings. She was referred and has neurosurgery appointment tomorrow that was scheduled on d/c from recent hospitalization. Upon reviewing this information with the  "patient, she is noted to be moving her right arm and was able to take her purse from me to find her appointment information for tomorrow.. She states "I just need to get [facility]." Counseled on availability of medicaid transport for appointments, she was apparently unaware.                              Clinical Impression:       ICD-10-CM ICD-9-CM   1. Weakness of right lower extremity  R29.898 729.89   2. Weakness  R53.1 780.79                      Disposition:   Disposition: Discharged  Condition: Stable     ED Disposition Condition    Discharge Stable        ED Prescriptions     None        Follow-up Information     Follow up With Specialties Details Why Contact Info    [facility]  Go in 1 day  As scheduled                                       Casimiro Barnhart MD  11/02/20 0955       Casimiro Barnhart MD  11/02/20 1038    "

## 2020-11-02 NOTE — ED NOTES
"Right elbow and FA swelling after IV contrast infiltrated radial pulses palpable. Patient states, "I feel like I can move my right hand better"   "

## 2020-11-02 NOTE — ED NOTES
Patient was assisted into a wheelchair, wheeled outside, and assisted into the vehicle. Patient was given diapers to go home with.

## 2020-11-02 NOTE — TELEPHONE ENCOUNTER
Was released from hospital past day or so and he is the ex-. patient is not with the caller at this time. Caller states he go a call from a family member that states the patient has no movement in right arm or leg. Advised to have patient call #[phone] at this time. Verbalized understanding.    Reason for Disposition   [1] Weakness (i.e., paralysis, loss of muscle strength) of the face, arm / hand, or leg / foot on one side of the body AND [2] sudden onset AND [3] present now    Protocols used: NEUROLOGIC DEFICIT-A-AH

## 2020-11-04 ENCOUNTER — HOSPITAL ENCOUNTER (EMERGENCY)
Facility: HOSPITAL | Age: 60
Discharge: ANOTHER HOSPITAL | End: 2020-11-04
Attending: EMERGENCY MEDICINE
Payer: MEDICAID

## 2020-11-04 VITALS
HEART RATE: 75 BPM | DIASTOLIC BLOOD PRESSURE: 65 MMHG | TEMPERATURE: 98 F | OXYGEN SATURATION: 98 % | RESPIRATION RATE: 18 BRPM | SYSTOLIC BLOOD PRESSURE: 170 MMHG

## 2020-11-04 DIAGNOSIS — M48.02 CERVICAL STENOSIS OF SPINE: Primary | ICD-10-CM

## 2020-11-04 LAB
CTP QC/QA: YES
POCT GLUCOSE: 257 MG/DL (ref 70–110)
SARS-COV-2 RDRP RESP QL NAA+PROBE: NEGATIVE

## 2020-11-04 PROCEDURE — U0002 COVID-19 LAB TEST NON-CDC: HCPCS | Performed by: EMERGENCY MEDICINE

## 2020-11-04 PROCEDURE — 82962 GLUCOSE BLOOD TEST: CPT

## 2020-11-04 PROCEDURE — 99285 EMERGENCY DEPT VISIT HI MDM: CPT

## 2020-11-04 NOTE — ED PROVIDER NOTES
Encounter Date: 2020       History     Chief Complaint   Patient presents with    Other Misc     Pt states called [facility] to bring her to [place].  Pt states they were going to let her stay there until Monday. [facility] called [facility] to confirm and they said no.  Pt states she has a MRI scheduled Monday.      This is a 60-year-old white female with progressive  Right-sided paralysis over the past 3-4 months.  MRI of her C-spine showed anterior osteophytes with disc desiccation.  C5-C6 appears to be broad-based disc effacing the core.  At C6 and 7 is herniation effacing the cord possible cord compression.  MRI of the L-spine with incidental hemangiomas, marked facet changes greater on the right at L2-3 and L3-4.  Marked facet changes and ligamentum flavum with 2 mm but in the left lateral position at L4-L5 with mild spinal stenosis and moderate left neural foraminal narrowing.  She was seen at the neurosurgical clinic in [place] today, patient states she was supposed to be admitted tomorrow at [place],  Patient returned home.  And then called an ambulance to bring her back to [place] to be admitted.  They refusing brought her here.  Patient complaining of right-sided weakness of arm and leg with progression        Review of patient's allergies indicates:   Allergen Reactions    Levemir [insulin detemir] Other (See Comments)     Sob rash to arms and chest  abd cramping diarrhea    Lisinopril Swelling    Shellfish containing products      Other^asthma    Losartan Other (See Comments)     Muscle cramp     Past Medical History:   Diagnosis Date    Cirrhosis of liver without mention of alcohol     COPD (chronic obstructive pulmonary disease)     Diabetes mellitus, type 2     Gout, unspecified     Hyperlipidemia     Hypertension     Obesity, unspecified     Unspecified vitamin D deficiency      Past Surgical History:   Procedure Laterality Date     SECTION  , ,  1985     x3     CHOLECYSTECTOMY  2011    COLONOSCOPY N/A 2017    Procedure: COLONOSCOPY;  Surgeon: Luis Bogran-Reyes, MD;  Location: [facility];  Service: Endoscopy;  Laterality: N/A;     Family History   Problem Relation Age of Onset    Alzheimer's disease Mother     Stroke Mother     Hypertension Mother     Heart attack Father     Hypertension Father     Diabetes Maternal Grandmother      Social History     Tobacco Use    Smoking status: Former Smoker     Quit date: 1979     Years since quittin.7    Smokeless tobacco: Never Used   Substance Use Topics    Alcohol use: No    Drug use: No     Review of Systems   Constitutional: Negative for fever.   HENT: Negative for sore throat.    Respiratory: Negative for shortness of breath.    Cardiovascular: Negative for chest pain.   Gastrointestinal: Negative for nausea.   Genitourinary: Negative for dysuria.   Musculoskeletal: Negative for back pain.   Skin: Negative for rash.   Neurological: Positive for weakness.   Hematological: Does not bruise/bleed easily.   All other systems reviewed and are negative.      Physical Exam     Initial Vitals [20 1544]   BP Pulse Resp Temp SpO2   (!) 147/67 68 18 97.6 °F (36.4 °C) 98 %      MAP       --         Physical Exam    Nursing note and vitals reviewed.  Constitutional: She appears well-developed and well-nourished. She is not diaphoretic. No distress.   HENT:   Head: Normocephalic and atraumatic.   Eyes: Conjunctivae and EOM are normal. Pupils are equal, round, and reactive to light.   Neck: Normal range of motion. Neck supple.   Cardiovascular: Normal rate, regular rhythm, normal heart sounds and intact distal pulses.   No murmur heard.  Pulmonary/Chest: Breath sounds normal. No respiratory distress. She has no wheezes. She has no rhonchi. She has no rales. She exhibits no tenderness.   Abdominal: Soft. Bowel sounds are normal.   Musculoskeletal: Normal range of motion. No tenderness or edema.   Neurological:  She is alert and oriented to person, place, and time. No cranial nerve deficit. GCS score is 15. GCS eye subscore is 4. GCS verbal subscore is 5. GCS motor subscore is 6.   Weakness of right arm and right leg   Skin: Skin is warm and dry. Capillary refill takes less than 2 seconds.         ED Course   Procedures  Labs Reviewed   POCT GLUCOSE - Abnormal; Notable for the following components:       Result Value    POCT Glucose 257 (*)     All other components within normal limits   SARS-COV-2 RDRP GENE    Narrative:     This test utilizes isothermal nucleic acid amplification   technology to detect the SARS-CoV-2 RdRp nucleic acid segment.   The analytical sensitivity (limit of detection) is 125 genome   equivalents/mL.   A POSITIVE result implies infection with the SARS-CoV-2 virus;   the patient is presumed to be contagious.     A NEGATIVE result means that SARS-CoV-2 nucleic acids are not   present above the limit of detection. A NEGATIVE result should be   treated as presumptive. It does not rule out the possibility of   COVID-19 and should not be the sole basis for treatment decisions.   If COVID-19 is strongly suspected based on clinical and exposure   history, re-testing using an alternate molecular assay should be   considered.   This test is only for use under the Food and Drug   Administration s Emergency Use Authorization (EUA).   Commercial kits are provided by [employer].   Performance characteristics of the EUA have been independently   verified by [facility] Department of   Pathology and Laboratory Medicine.   _________________________________________________________________   The authorized Fact Sheet for Healthcare Providers and the authorized Fact   Sheet for Patients of the ID NOW COVID-19 are available on the FDA   website:     https://www.fda.gov/media/367035/download  https://www.fda.gov/media/257159/download                Imaging Results    None          Medical Decision  Making:   Differential Diagnosis:    Cervical spine  stenosis                             Clinical Impression:     ICD-10-CM ICD-9-CM   1. Cervical stenosis of spine  M48.02 723.0                          ED Disposition Condition    Transfer to Another Facility Stable                            Chavo Jang MD  11/04/20 [id number]

## 2021-05-06 ENCOUNTER — PATIENT MESSAGE (OUTPATIENT)
Dept: RESEARCH | Facility: HOSPITAL | Age: 61
End: 2021-05-06

## 2021-05-10 ENCOUNTER — PATIENT MESSAGE (OUTPATIENT)
Dept: RESEARCH | Facility: HOSPITAL | Age: 61
End: 2021-05-10

## 2021-11-23 DIAGNOSIS — Z12.31 ENCOUNTER FOR SCREENING MAMMOGRAM FOR MALIGNANT NEOPLASM OF BREAST: Primary | ICD-10-CM

## 2021-11-30 ENCOUNTER — OFFICE VISIT (OUTPATIENT)
Dept: ENDOCRINOLOGY | Facility: CLINIC | Age: 61
End: 2021-11-30
Payer: MEDICAID

## 2021-11-30 VITALS
OXYGEN SATURATION: 99 % | HEART RATE: 54 BPM | WEIGHT: 177 LBS | SYSTOLIC BLOOD PRESSURE: 110 MMHG | BODY MASS INDEX: 30.22 KG/M2 | HEIGHT: 64 IN | DIASTOLIC BLOOD PRESSURE: 70 MMHG

## 2021-11-30 DIAGNOSIS — E78.2 MIXED HYPERLIPIDEMIA: ICD-10-CM

## 2021-11-30 DIAGNOSIS — E11.29 TYPE 2 DIABETES MELLITUS WITH MICROALBUMINURIA, WITH LONG-TERM CURRENT USE OF INSULIN: Primary | ICD-10-CM

## 2021-11-30 DIAGNOSIS — E11.69 DIABETES MELLITUS TYPE 2 IN OBESE: ICD-10-CM

## 2021-11-30 DIAGNOSIS — Z79.4 TYPE 2 DIABETES MELLITUS WITH MICROALBUMINURIA, WITH LONG-TERM CURRENT USE OF INSULIN: Primary | ICD-10-CM

## 2021-11-30 DIAGNOSIS — R80.9 TYPE 2 DIABETES MELLITUS WITH MICROALBUMINURIA, WITH LONG-TERM CURRENT USE OF INSULIN: Primary | ICD-10-CM

## 2021-11-30 DIAGNOSIS — E66.9 DIABETES MELLITUS TYPE 2 IN OBESE: ICD-10-CM

## 2021-11-30 DIAGNOSIS — E11.9 TYPE 2 DIABETES MELLITUS WITHOUT OPHTHALMIC MANIFESTATIONS: ICD-10-CM

## 2021-11-30 DIAGNOSIS — I63.312 CEREBROVASCULAR ACCIDENT (CVA) DUE TO THROMBOSIS OF LEFT MIDDLE CEREBRAL ARTERY: ICD-10-CM

## 2021-11-30 PROBLEM — I63.9 CVA (CEREBRAL VASCULAR ACCIDENT): Status: ACTIVE | Noted: 2021-11-30

## 2021-11-30 PROCEDURE — 99999 PR PBB SHADOW E&M-EST. PATIENT-LVL III: CPT | Mod: PBBFAC,,, | Performed by: STUDENT IN AN ORGANIZED HEALTH CARE EDUCATION/TRAINING PROGRAM

## 2021-11-30 PROCEDURE — 99204 OFFICE O/P NEW MOD 45 MIN: CPT | Mod: S$PBB,,, | Performed by: STUDENT IN AN ORGANIZED HEALTH CARE EDUCATION/TRAINING PROGRAM

## 2021-11-30 PROCEDURE — 99213 OFFICE O/P EST LOW 20 MIN: CPT | Mod: PBBFAC,PN | Performed by: STUDENT IN AN ORGANIZED HEALTH CARE EDUCATION/TRAINING PROGRAM

## 2021-11-30 PROCEDURE — 99204 PR OFFICE/OUTPT VISIT, NEW, LEVL IV, 45-59 MIN: ICD-10-PCS | Mod: S$PBB,,, | Performed by: STUDENT IN AN ORGANIZED HEALTH CARE EDUCATION/TRAINING PROGRAM

## 2021-11-30 PROCEDURE — 99999 PR PBB SHADOW E&M-EST. PATIENT-LVL III: ICD-10-PCS | Mod: PBBFAC,,, | Performed by: STUDENT IN AN ORGANIZED HEALTH CARE EDUCATION/TRAINING PROGRAM

## 2021-11-30 RX ORDER — POLYETHYLENE GLYCOL 3350 17 G/17G
POWDER, FOR SOLUTION ORAL
COMMUNITY

## 2021-11-30 RX ORDER — ASPIRIN 325 MG
325 TABLET ORAL DAILY
COMMUNITY
End: 2024-03-13

## 2021-11-30 RX ORDER — CLOPIDOGREL BISULFATE 75 MG/1
TABLET ORAL
COMMUNITY
Start: 2021-11-22 | End: 2023-06-23

## 2021-11-30 RX ORDER — ATORVASTATIN CALCIUM 80 MG/1
80 TABLET, FILM COATED ORAL DAILY
COMMUNITY
Start: 2021-11-06 | End: 2024-03-13

## 2021-11-30 RX ORDER — FLASH GLUCOSE SENSOR
1 KIT MISCELLANEOUS
Qty: 2 KIT | Refills: 5 | Status: SHIPPED | OUTPATIENT
Start: 2021-11-30 | End: 2022-02-28

## 2022-01-29 ENCOUNTER — HOSPITAL ENCOUNTER (EMERGENCY)
Facility: HOSPITAL | Age: 62
Discharge: HOME | End: 2022-01-29
Attending: STUDENT IN AN ORGANIZED HEALTH CARE EDUCATION/TRAINING PROGRAM
Payer: MEDICAID

## 2022-01-29 VITALS
BODY MASS INDEX: 29.49 KG/M2 | WEIGHT: 177 LBS | OXYGEN SATURATION: 100 % | TEMPERATURE: 98 F | SYSTOLIC BLOOD PRESSURE: 149 MMHG | HEIGHT: 65 IN | RESPIRATION RATE: 18 BRPM | HEART RATE: 69 BPM | DIASTOLIC BLOOD PRESSURE: 62 MMHG

## 2022-01-29 DIAGNOSIS — J02.9 ACUTE PHARYNGITIS, UNSPECIFIED ETIOLOGY: Primary | ICD-10-CM

## 2022-01-29 LAB
ALBUMIN SERPL BCP-MCNC: 3.9 G/DL (ref 3.5–5.2)
ALP SERPL-CCNC: 84 U/L (ref 55–135)
ALT SERPL W/O P-5'-P-CCNC: 21 U/L (ref 10–44)
ANION GAP SERPL CALC-SCNC: 4 MMOL/L (ref 8–16)
AST SERPL-CCNC: 10 U/L (ref 10–40)
BASOPHILS # BLD AUTO: 0.05 K/UL (ref 0–0.2)
BASOPHILS NFR BLD: 0.4 % (ref 0–1.9)
BILIRUB SERPL-MCNC: 0.8 MG/DL (ref 0.1–1)
BUN SERPL-MCNC: 16 MG/DL (ref 6–20)
CALCIUM SERPL-MCNC: 9 MG/DL (ref 8.7–10.5)
CHLORIDE SERPL-SCNC: 107 MMOL/L (ref 95–110)
CO2 SERPL-SCNC: 28 MMOL/L (ref 23–29)
CREAT SERPL-MCNC: 0.7 MG/DL (ref 0.5–1.4)
DIFFERENTIAL METHOD: ABNORMAL
EOSINOPHIL # BLD AUTO: 0.1 K/UL (ref 0–0.5)
EOSINOPHIL NFR BLD: 1.1 % (ref 0–8)
ERYTHROCYTE [DISTWIDTH] IN BLOOD BY AUTOMATED COUNT: 12.8 % (ref 11.5–14.5)
EST. GFR  (AFRICAN AMERICAN): >60 ML/MIN/1.73 M^2
EST. GFR  (NON AFRICAN AMERICAN): >60 ML/MIN/1.73 M^2
GLUCOSE SERPL-MCNC: 136 MG/DL (ref 70–110)
HCT VFR BLD AUTO: 35.9 % (ref 37–48.5)
HGB BLD-MCNC: 11.8 G/DL (ref 12–16)
IMM GRANULOCYTES # BLD AUTO: 0.05 K/UL (ref 0–0.04)
IMM GRANULOCYTES NFR BLD AUTO: 0.4 % (ref 0–0.5)
LYMPHOCYTES # BLD AUTO: 1.4 K/UL (ref 1–4.8)
LYMPHOCYTES NFR BLD: 10.7 % (ref 18–48)
MCH RBC QN AUTO: 30.5 PG (ref 27–31)
MCHC RBC AUTO-ENTMCNC: 32.9 G/DL (ref 32–36)
MCV RBC AUTO: 93 FL (ref 82–98)
MONOCYTES # BLD AUTO: 1 K/UL (ref 0.3–1)
MONOCYTES NFR BLD: 8 % (ref 4–15)
NEUTROPHILS # BLD AUTO: 10.2 K/UL (ref 1.8–7.7)
NEUTROPHILS NFR BLD: 79.4 % (ref 38–73)
NRBC BLD-RTO: 0 /100 WBC
PLATELET # BLD AUTO: 249 K/UL (ref 150–450)
PMV BLD AUTO: 10.7 FL (ref 9.2–12.9)
POTASSIUM SERPL-SCNC: 4 MMOL/L (ref 3.5–5.1)
PROT SERPL-MCNC: 7.6 G/DL (ref 6–8.4)
RBC # BLD AUTO: 3.87 M/UL (ref 4–5.4)
SODIUM SERPL-SCNC: 139 MMOL/L (ref 136–145)
WBC # BLD AUTO: 12.87 K/UL (ref 3.9–12.7)

## 2022-01-29 PROCEDURE — 99285 EMERGENCY DEPT VISIT HI MDM: CPT | Mod: 25

## 2022-01-29 PROCEDURE — 25000003 PHARM REV CODE 250: Performed by: STUDENT IN AN ORGANIZED HEALTH CARE EDUCATION/TRAINING PROGRAM

## 2022-01-29 PROCEDURE — 25500020 PHARM REV CODE 255: Performed by: STUDENT IN AN ORGANIZED HEALTH CARE EDUCATION/TRAINING PROGRAM

## 2022-01-29 PROCEDURE — 80053 COMPREHEN METABOLIC PANEL: CPT | Performed by: NURSE PRACTITIONER

## 2022-01-29 PROCEDURE — 96375 TX/PRO/DX INJ NEW DRUG ADDON: CPT | Mod: 59

## 2022-01-29 PROCEDURE — 63600175 PHARM REV CODE 636 W HCPCS: Performed by: STUDENT IN AN ORGANIZED HEALTH CARE EDUCATION/TRAINING PROGRAM

## 2022-01-29 PROCEDURE — 85025 COMPLETE CBC W/AUTO DIFF WBC: CPT | Performed by: NURSE PRACTITIONER

## 2022-01-29 PROCEDURE — 96374 THER/PROPH/DIAG INJ IV PUSH: CPT

## 2022-01-29 RX ORDER — DEXAMETHASONE SODIUM PHOSPHATE 4 MG/ML
12 INJECTION, SOLUTION INTRA-ARTICULAR; INTRALESIONAL; INTRAMUSCULAR; INTRAVENOUS; SOFT TISSUE
Status: COMPLETED | OUTPATIENT
Start: 2022-01-29 | End: 2022-01-29

## 2022-01-29 RX ORDER — KETOROLAC TROMETHAMINE 30 MG/ML
30 INJECTION, SOLUTION INTRAMUSCULAR; INTRAVENOUS
Status: COMPLETED | OUTPATIENT
Start: 2022-01-29 | End: 2022-01-29

## 2022-01-29 RX ORDER — AMOXICILLIN AND CLAVULANATE POTASSIUM 875; 125 MG/1; MG/1
1 TABLET, FILM COATED ORAL
Status: COMPLETED | OUTPATIENT
Start: 2022-01-29 | End: 2022-01-29

## 2022-01-29 RX ORDER — AMOXICILLIN AND CLAVULANATE POTASSIUM 875; 125 MG/1; MG/1
1 TABLET, FILM COATED ORAL 2 TIMES DAILY
Qty: 14 TABLET | Refills: 0 | OUTPATIENT
Start: 2022-01-29 | End: 2022-07-27

## 2022-01-29 RX ORDER — AMOXICILLIN 875 MG/1
875 TABLET, FILM COATED ORAL 2 TIMES DAILY
Qty: 20 TABLET | Refills: 0 | Status: SHIPPED | OUTPATIENT
Start: 2022-01-29 | End: 2022-01-29 | Stop reason: SDUPTHER

## 2022-01-29 RX ORDER — DEXAMETHASONE SODIUM PHOSPHATE 4 MG/ML
8 INJECTION, SOLUTION INTRA-ARTICULAR; INTRALESIONAL; INTRAMUSCULAR; INTRAVENOUS; SOFT TISSUE
Status: DISCONTINUED | OUTPATIENT
Start: 2022-01-29 | End: 2022-01-29

## 2022-01-29 RX ADMIN — DEXAMETHASONE SODIUM PHOSPHATE 12 MG: 4 INJECTION, SOLUTION INTRA-ARTICULAR; INTRALESIONAL; INTRAMUSCULAR; INTRAVENOUS; SOFT TISSUE at 10:01

## 2022-01-29 RX ADMIN — AMOXICILLIN AND CLAVULANATE POTASSIUM 1 TABLET: 875; 125 TABLET, FILM COATED ORAL at 11:01

## 2022-01-29 RX ADMIN — KETOROLAC TROMETHAMINE 30 MG: 30 INJECTION, SOLUTION INTRAMUSCULAR at 10:01

## 2022-01-29 RX ADMIN — IOHEXOL 80 ML: 350 INJECTION, SOLUTION INTRAVENOUS at 10:01

## 2022-01-29 NOTE — DISCHARGE INSTRUCTIONS
Be sure to finish all antibiotics and follow-up with your primary doctor.  Return to the emergency room for worsening condition.

## 2022-01-29 NOTE — ED PROVIDER NOTES
Encounter Date: 2022       History     Chief Complaint   Patient presents with    Otalgia     Patient reports that she is having ear and throat pain     61-year-old female with history of hypertension, high cholesterol, show of right-sided weakness presents with sore throat and earache for the last 3 days.  Patient has not tried any for pain.  Pain is worse with swallowing but able to tolerate saliva.  Denies any neck stiffness, fever, trauma, vomiting,        Review of patient's allergies indicates:   Allergen Reactions    Levemir [insulin detemir] Other (See Comments)     Sob rash to arms and chest  abd cramping diarrhea    Lisinopril Swelling    Shellfish containing products      Other^asthma    Losartan Other (See Comments)     Muscle cramp     Past Medical History:   Diagnosis Date    Cirrhosis of liver without mention of alcohol     COPD (chronic obstructive pulmonary disease)     Diabetes mellitus, type 2     Gout, unspecified     Hyperlipidemia     Hypertension     Obesity, unspecified     Unspecified vitamin D deficiency      Past Surgical History:   Procedure Laterality Date     SECTION  , ,  1985     x3    CHOLECYSTECTOMY  2011    COLONOSCOPY N/A 2017    Procedure: COLONOSCOPY;  Surgeon: Luis Bogran-Reyes, MD;  [facility];  Service: Endoscopy;  Laterality: N/A;    HYSTERECTOMY       Family History   Problem Relation Age of Onset    Alzheimer's disease Mother     Stroke Mother     Hypertension Mother     Heart attack Father     Hypertension Father     Diabetes Maternal Grandmother      Social History     Tobacco Use    Smoking status: Former Smoker     Quit date: 1979     Years since quittin.0    Smokeless tobacco: Never Used   Substance Use Topics    Alcohol use: No    Drug use: No     Review of Systems   Constitutional: Negative.    HENT: Positive for ear pain and sore throat.    Respiratory: Negative.    Cardiovascular:  Negative.    Gastrointestinal: Negative.    Genitourinary: Negative.    Musculoskeletal: Negative.    Skin: Negative.    Neurological: Negative.    Psychiatric/Behavioral: Negative.    All other systems reviewed and are negative.      Physical Exam     Initial Vitals [01/29/22 0938]   BP Pulse Resp Temp SpO2   (!) 160/57 61 18 98.4 °F (36.9 °C) 100 %      MAP       --         Physical Exam    Nursing note and vitals reviewed.  Constitutional: Vital signs are normal. She appears well-developed and well-nourished.   HENT:   Head: Normocephalic and atraumatic.   Right Ear: External ear normal.   Left Ear: External ear normal.   Mouth/Throat: No oropharyngeal exudate.   Erythema in the back of the throat   Eyes: Conjunctivae and lids are normal.   Neck: Trachea normal. Neck supple.   Cardiovascular: Normal rate, regular rhythm and normal pulses.   Pulmonary/Chest: Breath sounds normal. No respiratory distress.   Abdominal: Abdomen is soft. Bowel sounds are normal.   Musculoskeletal:         General: Normal range of motion.      Cervical back: Neck supple.     Neurological: She is alert and oriented to person, place, and time.   Skin: Skin is warm. Capillary refill takes less than 2 seconds.   Psychiatric: She has a normal mood and affect. Her speech is normal.         ED Course   Procedures  Labs Reviewed   CBC W/ AUTO DIFFERENTIAL - Abnormal; Notable for the following components:       Result Value    WBC 12.87 (*)     RBC 3.87 (*)     Hemoglobin 11.8 (*)     Hematocrit 35.9 (*)     Gran # (ANC) 10.2 (*)     Immature Grans (Abs) 0.05 (*)     Gran % 79.4 (*)     Lymph % 10.7 (*)     All other components within normal limits   COMPREHENSIVE METABOLIC PANEL - Abnormal; Notable for the following components:    Glucose 136 (*)     Anion Gap 4 (*)     All other components within normal limits          Imaging Results          CT Soft Tissue Neck With Contrast (Final result)  Result time 01/29/22 11:00:40    Final result by  Tracie Malin MD (01/29/22 11:00:40)                 Impression:      Prominence of the tonsils and peritonsillar region greater on right but no focal walled-off fluid collection to suggest an abscess.  Minimal reticular changes in the right apex probably scarring or fibrosis but recommend follow-up      Electronically signed by: Tracie Malin MD  Date:    01/29/2022  Time:    11:00             Narrative:    EXAMINATION:  CT SOFT TISSUE NECK WITH CONTRAST    CLINICAL HISTORY:  r/o peritonsillar abscess;    TECHNIQUE:  Iterative reconstruction technique was used.    CT/Cardiac Nuclear exams in prior 12 months: 0    COMPARISON:  None.    FINDINGS:  Slight prominent reticular markings in the right upper lobe likely scarring or discoid atelectasis but recommend follow-up.  The parotid and submandibular glands are symmetric.  The parapharyngeal fat is normal.  The epiglottis is normal.   prominence of the tonsillar and peritonsillar region greater on the right  but no focal fluid collection to suggest an abscess.                                 Medications   amoxicillin-clavulanate 875-125mg per tablet 1 tablet (has no administration in time range)   ketorolac injection 30 mg (30 mg Intravenous Given 1/29/22 1043)   dexamethasone injection 12 mg (12 mg Intravenous Given 1/29/22 1044)   iohexoL (OMNIPAQUE 350) injection 80 mL (80 mLs Intravenous Given 1/29/22 1047)     Medical Decision Making:   Initial Assessment:   61-year-old female with history of hypertension, high cholesterol, show of right-sided weakness presents with sore throat and earache for the last 3 days afebrile vitals stable.  Physical exam noted.  Will get labs and imaging to rule out peritonsillar abscess.  AA  Clinical Tests:   Lab Tests: Ordered and Reviewed  The following lab test(s) were unremarkable: CBC and CMP  Radiological Study: Ordered and Reviewed             ED Course as of 01/29/22 1115   Sat Jan 29, 2022   1103 Given finding of uvula  deviation and right soft palate swelling CT of neck obtained.  CT soft tissues neck reveals prominence of the tonsils and peritonsillar region greater on right but no focal walled-off fluid collection to suggest an abscess.  Given finding of exudative pharyngitis will treat empirically for strep throat. [CB]   1112 Labs and imaging noted.  No obvious abscess to drain.  Will treat patient with antibiotics.  Return precautions given [HD]      ED Course User Index  [CB] Chyna Mi NP  [HD] Vijay Duran MD             Clinical Impression:   Final diagnoses:  [J02.9] Acute pharyngitis, unspecified etiology (Primary)          ED Disposition Condition    Discharge Stable        ED Prescriptions     Medication Sig Dispense Start Date End Date Auth. Provider    amoxicillin (AMOXIL) 875 MG tablet  (Status: Discontinued) Take 1 tablet (875 mg total) by mouth 2 (two) times daily. for 10 days 20 tablet 1/29/2022 1/29/2022 Chyna Mi NP    amoxicillin-clavulanate 875-125mg (AUGMENTIN) 875-125 mg per tablet Take 1 tablet by mouth 2 (two) times daily. 14 tablet 1/29/2022  Vijay Duran MD        Follow-up Information     Follow up With Specialties Details Why Contact Info    PCP Follow UP  Call in 2 days for follow-up, for re-evaluation of today's complaint            Vijay Duran MD  01/29/22 8853

## 2022-01-29 NOTE — ED PROVIDER NOTES
Encounter Date: 2022       History     Chief Complaint   Patient presents with    Otalgia     Patient reports that she is having ear and throat pain     This is a 61-year-old white female with multiple medical problems including type 2 diabetes mellitus, CVA with right-sided residual weakness, COPD, and hypertension who presents to the emergency department with complaints of sore throat for 3 days.  Patient reports right-sided sore throat pain exacerbated by swallowing with radiation to the right ear.  Patient denies known fever, difficulty opening the mouth, difficulty handling saliva, or nausea/vomiting.        Review of patient's allergies indicates:   Allergen Reactions    Levemir [insulin detemir] Other (See Comments)     Sob rash to arms and chest  abd cramping diarrhea    Lisinopril Swelling    Shellfish containing products      Other^asthma    Losartan Other (See Comments)     Muscle cramp     Past Medical History:   Diagnosis Date    Cirrhosis of liver without mention of alcohol     COPD (chronic obstructive pulmonary disease)     Diabetes mellitus, type 2     Gout, unspecified     Hyperlipidemia     Hypertension     Obesity, unspecified     Unspecified vitamin D deficiency      Past Surgical History:   Procedure Laterality Date     SECTION  , ,  1985     x3    CHOLECYSTECTOMY  2011    COLONOSCOPY N/A 2017    Procedure: COLONOSCOPY;  Surgeon: Luis Bogran-Reyes, MD;  Location: [facility];  Service: Endoscopy;  Laterality: N/A;    HYSTERECTOMY       Family History   Problem Relation Age of Onset    Alzheimer's disease Mother     Stroke Mother     Hypertension Mother     Heart attack Father     Hypertension Father     Diabetes Maternal Grandmother      Social History     Tobacco Use    Smoking status: Former Smoker     Quit date: 1979     Years since quittin.0    Smokeless tobacco: Never Used   Substance Use Topics    Alcohol use: No    Drug  use: No     Review of Systems   Constitutional: Negative.    HENT: Positive for ear pain and sore throat.    Respiratory: Negative.    Cardiovascular: Negative.    Gastrointestinal: Negative.    Musculoskeletal: Negative.        Physical Exam     Initial Vitals [01/29/22 0938]   BP Pulse Resp Temp SpO2   (!) 160/57 61 18 98.4 °F (36.9 °C) 100 %      MAP       --         Physical Exam    Nursing note and vitals reviewed.  Constitutional: She appears well-developed and well-nourished. She is active. No distress.   HENT:   Head: Normocephalic and atraumatic.   Mouth/Throat: No trismus in the jaw. Uvula swelling (With deviation to left) present. Oropharyngeal exudate, posterior oropharyngeal edema and posterior oropharyngeal erythema present. No tonsillar abscesses.   Eyes: EOM are normal. Pupils are equal, round, and reactive to light.   Neck: Neck supple.   Normal range of motion.  Cardiovascular: Normal rate, regular rhythm and normal heart sounds.   Pulmonary/Chest: Breath sounds normal. No respiratory distress.   Musculoskeletal:      Cervical back: Normal range of motion and neck supple.     Lymphadenopathy:     She has no cervical adenopathy.   Neurological: She is alert and oriented to person, place, and time. GCS score is 15. GCS eye subscore is 4. GCS verbal subscore is 5. GCS motor subscore is 6.   Skin: Skin is warm and dry. Capillary refill takes less than 2 seconds.   Psychiatric: She has a normal mood and affect. Her behavior is normal. Thought content normal.         ED Course   Procedures  Labs Reviewed   CBC W/ AUTO DIFFERENTIAL - Abnormal; Notable for the following components:       Result Value    WBC 12.87 (*)     RBC 3.87 (*)     Hemoglobin 11.8 (*)     Hematocrit 35.9 (*)     Gran # (ANC) 10.2 (*)     Immature Grans (Abs) 0.05 (*)     Gran % 79.4 (*)     Lymph % 10.7 (*)     All other components within normal limits   COMPREHENSIVE METABOLIC PANEL - Abnormal; Notable for the following components:     Glucose 136 (*)     Anion Gap 4 (*)     All other components within normal limits          Imaging Results          CT Soft Tissue Neck With Contrast (Final result)  Result time 01/29/22 11:00:40    Final result by Tracie Malin MD (01/29/22 11:00:40)                 Impression:      Prominence of the tonsils and peritonsillar region greater on right but no focal walled-off fluid collection to suggest an abscess.  Minimal reticular changes in the right apex probably scarring or fibrosis but recommend follow-up      Electronically signed by: Tracie Malin MD  Date:    01/29/2022  Time:    11:00             Narrative:    EXAMINATION:  CT SOFT TISSUE NECK WITH CONTRAST    CLINICAL HISTORY:  r/o peritonsillar abscess;    TECHNIQUE:  Iterative reconstruction technique was used.    CT/Cardiac Nuclear exams in prior 12 months: 0    COMPARISON:  None.    FINDINGS:  Slight prominent reticular markings in the right upper lobe likely scarring or discoid atelectasis but recommend follow-up.  The parotid and submandibular glands are symmetric.  The parapharyngeal fat is normal.  The epiglottis is normal.   prominence of the tonsillar and peritonsillar region greater on the right  but no focal fluid collection to suggest an abscess.                                 Medications   ketorolac injection 30 mg (30 mg Intravenous Given 1/29/22 1043)   dexamethasone injection 12 mg (12 mg Intravenous Given 1/29/22 1044)   iohexoL (OMNIPAQUE 350) injection 80 mL (80 mLs Intravenous Given 1/29/22 1047)   amoxicillin-clavulanate 875-125mg per tablet 1 tablet (1 tablet Oral Given 1/29/22 1120)                 ED Course as of 01/31/22 0955   Sat Jan 29, 2022   1103 Given finding of uvula deviation and right soft palate swelling CT of neck obtained.  CT soft tissues neck reveals prominence of the tonsils and peritonsillar region greater on right but no focal walled-off fluid collection to suggest an abscess.  Given finding of exudative  pharyngitis will treat empirically for strep throat. [CB]   1112 Labs and imaging noted.  No obvious abscess to drain.  Will treat patient with antibiotics.  Return precautions given [HD]      ED Course User Index  [CB] Chyna Mi NP  [HD] Vijay Duran MD             Clinical Impression:   Final diagnoses:  [J02.9] Acute pharyngitis, unspecified etiology (Primary)          ED Disposition Condition    Discharge Stable        ED Prescriptions     Medication Sig Dispense Start Date End Date Auth. Provider    amoxicillin (AMOXIL) 875 MG tablet  (Status: Discontinued) Take 1 tablet (875 mg total) by mouth 2 (two) times daily. for 10 days 20 tablet 1/29/2022 1/29/2022 Chyna Mi NP    amoxicillin-clavulanate 875-125mg (AUGMENTIN) 875-125 mg per tablet Take 1 tablet by mouth 2 (two) times daily. 14 tablet 1/29/2022  Vijay Duran MD        Follow-up Information     Follow up With Specialties Details Why Contact Info    PCP Follow UP  Call in 2 days for follow-up, for re-evaluation of today's complaint            Chyna Mi NP  01/29/22 8894       Chyna Mi NP  01/31/22 2414

## 2022-01-29 NOTE — ED NOTES
NEUROLOGICAL:   Patient is awake , alert  and oriented x 4 . Pupils are PERRL. Gait is unsteady due to hx of stroke with right sided deficit.  Is not able to move right arm and leg due to hx of stroke.   Patient reports no neuro complaints..  GCS 15    HEENT:   Head appears normocephalic  and symmetric .   Eyes appear WNL to both eyes. Patient reports no complaints  to both eyes .   Ears appear WNL. Patient reports pain to   both ears.   Nares appear patent . Patient reports no nose complaints .  Mouth appears moist, pink and teeth intact. Patient reports no mouth complaints.   Throat appears moist , red , with white patches  and with enlarged tonsils. Patient reports difficulty swallowing, sore throat and pain with swallowing.    CARDIOVASCULAR:   S1 and S2 present, no murmurs, gallops, or rubs, rate regular  and pulses palpable (2+)    On palpation no edema noted , noted to none.   Patient reports no CV complaints.  .   Patient vitals are WNL.    RESPIRATORY:   Airway Clear, Open, and Patent.  Respirations are even and unlabored.   Breath sounds clear  to all lung fields.   Patient reports no respiratory complaints.     GASTROINTESTINAL:   Abdomen is soft  and non-tender x 4 quadrants. Bowel sounds are normoactive to all quadrants .   Patient reports no GI complaints .     GENITOURINARY:   Patient reports no  complaints.     MUSCULOSKELETAL:   Full ROM on left side.  Patient reports weakness to RUE and weakness to RLE    SKIN:   Skin appears warm , dry , good turgor, color normal for race and intact. Patient reports no skin complaints.

## 2022-02-02 ENCOUNTER — HOSPITAL ENCOUNTER (EMERGENCY)
Facility: HOSPITAL | Age: 62
Discharge: HOME | End: 2022-02-02
Attending: EMERGENCY MEDICINE
Payer: MEDICAID

## 2022-02-02 VITALS
DIASTOLIC BLOOD PRESSURE: 77 MMHG | TEMPERATURE: 98 F | OXYGEN SATURATION: 98 % | HEIGHT: 65 IN | BODY MASS INDEX: 29.49 KG/M2 | SYSTOLIC BLOOD PRESSURE: 122 MMHG | RESPIRATION RATE: 18 BRPM | HEART RATE: 73 BPM | WEIGHT: 177 LBS

## 2022-02-02 DIAGNOSIS — J02.9 PHARYNGITIS, UNSPECIFIED ETIOLOGY: Primary | ICD-10-CM

## 2022-02-02 LAB
ALBUMIN SERPL BCP-MCNC: 3.1 G/DL (ref 3.5–5.2)
ALP SERPL-CCNC: 74 U/L (ref 55–135)
ALT SERPL W/O P-5'-P-CCNC: 50 U/L (ref 10–44)
ANION GAP SERPL CALC-SCNC: 5 MMOL/L (ref 8–16)
AST SERPL-CCNC: 22 U/L (ref 10–40)
BASOPHILS # BLD AUTO: 0.02 K/UL (ref 0–0.2)
BASOPHILS NFR BLD: 0.5 % (ref 0–1.9)
BILIRUB SERPL-MCNC: 0.4 MG/DL (ref 0.1–1)
BUN SERPL-MCNC: 13 MG/DL (ref 6–20)
CALCIUM SERPL-MCNC: 8.9 MG/DL (ref 8.7–10.5)
CHLORIDE SERPL-SCNC: 109 MMOL/L (ref 95–110)
CO2 SERPL-SCNC: 29 MMOL/L (ref 23–29)
CREAT SERPL-MCNC: 0.7 MG/DL (ref 0.5–1.4)
CTP QC/QA: YES
DIFFERENTIAL METHOD: ABNORMAL
EOSINOPHIL # BLD AUTO: 0.1 K/UL (ref 0–0.5)
EOSINOPHIL NFR BLD: 3.2 % (ref 0–8)
ERYTHROCYTE [DISTWIDTH] IN BLOOD BY AUTOMATED COUNT: 12.5 % (ref 11.5–14.5)
EST. GFR  (AFRICAN AMERICAN): >60 ML/MIN/1.73 M^2
EST. GFR  (NON AFRICAN AMERICAN): >60 ML/MIN/1.73 M^2
GLUCOSE SERPL-MCNC: 142 MG/DL (ref 70–110)
GROUP A STREP, MOLECULAR: NEGATIVE
HCT VFR BLD AUTO: 31.8 % (ref 37–48.5)
HGB BLD-MCNC: 10.5 G/DL (ref 12–16)
IMM GRANULOCYTES # BLD AUTO: 0.01 K/UL (ref 0–0.04)
IMM GRANULOCYTES NFR BLD AUTO: 0.2 % (ref 0–0.5)
LYMPHOCYTES # BLD AUTO: 0.9 K/UL (ref 1–4.8)
LYMPHOCYTES NFR BLD: 21 % (ref 18–48)
MCH RBC QN AUTO: 30.3 PG (ref 27–31)
MCHC RBC AUTO-ENTMCNC: 33 G/DL (ref 32–36)
MCV RBC AUTO: 92 FL (ref 82–98)
MONOCYTES # BLD AUTO: 0.6 K/UL (ref 0.3–1)
MONOCYTES NFR BLD: 13.2 % (ref 4–15)
NEUTROPHILS # BLD AUTO: 2.7 K/UL (ref 1.8–7.7)
NEUTROPHILS NFR BLD: 61.9 % (ref 38–73)
NRBC BLD-RTO: 0 /100 WBC
PLATELET # BLD AUTO: 150 K/UL (ref 150–450)
PLATELET BLD QL SMEAR: ABNORMAL
PMV BLD AUTO: 10.8 FL (ref 9.2–12.9)
POTASSIUM SERPL-SCNC: 3.8 MMOL/L (ref 3.5–5.1)
PROT SERPL-MCNC: 6.9 G/DL (ref 6–8.4)
RBC # BLD AUTO: 3.47 M/UL (ref 4–5.4)
SARS-COV-2 RDRP RESP QL NAA+PROBE: NEGATIVE
SODIUM SERPL-SCNC: 143 MMOL/L (ref 136–145)
WBC # BLD AUTO: 4.38 K/UL (ref 3.9–12.7)

## 2022-02-02 PROCEDURE — 25000003 PHARM REV CODE 250: Performed by: EMERGENCY MEDICINE

## 2022-02-02 PROCEDURE — 96375 TX/PRO/DX INJ NEW DRUG ADDON: CPT

## 2022-02-02 PROCEDURE — 96365 THER/PROPH/DIAG IV INF INIT: CPT

## 2022-02-02 PROCEDURE — 80053 COMPREHEN METABOLIC PANEL: CPT | Performed by: EMERGENCY MEDICINE

## 2022-02-02 PROCEDURE — 36415 COLL VENOUS BLD VENIPUNCTURE: CPT | Performed by: EMERGENCY MEDICINE

## 2022-02-02 PROCEDURE — 85025 COMPLETE CBC W/AUTO DIFF WBC: CPT | Performed by: EMERGENCY MEDICINE

## 2022-02-02 PROCEDURE — 63600175 PHARM REV CODE 636 W HCPCS: Performed by: EMERGENCY MEDICINE

## 2022-02-02 PROCEDURE — 99284 EMERGENCY DEPT VISIT MOD MDM: CPT | Mod: 25

## 2022-02-02 PROCEDURE — 87651 STREP A DNA AMP PROBE: CPT | Performed by: EMERGENCY MEDICINE

## 2022-02-02 PROCEDURE — U0002 COVID-19 LAB TEST NON-CDC: HCPCS | Performed by: EMERGENCY MEDICINE

## 2022-02-02 RX ORDER — CLINDAMYCIN PHOSPHATE 900 MG/50ML
900 INJECTION, SOLUTION INTRAVENOUS
Status: DISCONTINUED | OUTPATIENT
Start: 2022-02-02 | End: 2022-02-02

## 2022-02-02 RX ORDER — DEXAMETHASONE SODIUM PHOSPHATE 4 MG/ML
4 INJECTION, SOLUTION INTRA-ARTICULAR; INTRALESIONAL; INTRAMUSCULAR; INTRAVENOUS; SOFT TISSUE
Status: COMPLETED | OUTPATIENT
Start: 2022-02-02 | End: 2022-02-02

## 2022-02-02 RX ORDER — DEXAMETHASONE SODIUM PHOSPHATE 4 MG/ML
8 INJECTION, SOLUTION INTRA-ARTICULAR; INTRALESIONAL; INTRAMUSCULAR; INTRAVENOUS; SOFT TISSUE
Status: DISCONTINUED | OUTPATIENT
Start: 2022-02-02 | End: 2022-02-02

## 2022-02-02 RX ORDER — CLINDAMYCIN PHOSPHATE 150 MG/ML
600 INJECTION, SOLUTION INTRAVENOUS
Status: DISCONTINUED | OUTPATIENT
Start: 2022-02-02 | End: 2022-02-02

## 2022-02-02 RX ORDER — DEXAMETHASONE SODIUM PHOSPHATE 4 MG/ML
4 INJECTION, SOLUTION INTRA-ARTICULAR; INTRALESIONAL; INTRAMUSCULAR; INTRAVENOUS; SOFT TISSUE
Status: DISCONTINUED | OUTPATIENT
Start: 2022-02-02 | End: 2022-02-02

## 2022-02-02 RX ORDER — CLINDAMYCIN PHOSPHATE 900 MG/50ML
900 INJECTION, SOLUTION INTRAVENOUS
Status: COMPLETED | OUTPATIENT
Start: 2022-02-02 | End: 2022-02-02

## 2022-02-02 RX ADMIN — DEXAMETHASONE SODIUM PHOSPHATE 4 MG: 4 INJECTION, SOLUTION INTRA-ARTICULAR; INTRALESIONAL; INTRAMUSCULAR; INTRAVENOUS; SOFT TISSUE at 05:02

## 2022-02-02 RX ADMIN — CLINDAMYCIN IN 5 PERCENT DEXTROSE 900 MG: 18 INJECTION, SOLUTION INTRAVENOUS at 06:02

## 2022-02-02 NOTE — ED PROVIDER NOTES
Encounter Date: 2022       History     Chief Complaint   Patient presents with    Sore Throat     Pt stated she was here about 3 days ago for a sore throat. Pt stated she was placed on antibiotics but feels that they are not working.      62 yo female here with worsening sore throat x 3 days after seen in ED for same with CT neck demonstrating enlarged tonsil without abscess. Placed on abx, reports improved for one day but worse since. Pain is worse with swallowing. No alleviating factors. Gradual. No radiation of pain. No cough. No dyspnea. Similar to previous.         Review of patient's allergies indicates:   Allergen Reactions    Levemir [insulin detemir] Other (See Comments)     Sob rash to arms and chest  abd cramping diarrhea    Lisinopril Swelling    Shellfish containing products      Other^asthma    Losartan Other (See Comments)     Muscle cramp     Past Medical History:   Diagnosis Date    Cirrhosis of liver without mention of alcohol     COPD (chronic obstructive pulmonary disease)     Diabetes mellitus, type 2     Gout, unspecified     Hyperlipidemia     Hypertension     Obesity, unspecified     Unspecified vitamin D deficiency      Past Surgical History:   Procedure Laterality Date     SECTION  , ,  1985     x3    CHOLECYSTECTOMY  2011    COLONOSCOPY N/A 2017    Procedure: COLONOSCOPY;  Surgeon: Luis Bogran-Reyes, MD;  Location: [facility];  Service: Endoscopy;  Laterality: N/A;    HYSTERECTOMY       Family History   Problem Relation Age of Onset    Alzheimer's disease Mother     Stroke Mother     Hypertension Mother     Heart attack Father     Hypertension Father     Diabetes Maternal Grandmother      Social History     Tobacco Use    Smoking status: Former Smoker     Quit date: 1979     Years since quittin.0    Smokeless tobacco: Never Used   Substance Use Topics    Alcohol use: No    Drug use: No     Review of Systems   Constitutional:  Negative.    HENT: Positive for sore throat.    Respiratory: Negative.    Cardiovascular: Negative.    Gastrointestinal: Negative.    All other systems reviewed and are negative.      Physical Exam     Initial Vitals [02/02/22 0455]   BP Pulse Resp Temp SpO2   (!) 185/82 92 14 98.9 °F (37.2 °C) 99 %      MAP       --         Physical Exam    Nursing note and vitals reviewed.  Constitutional: She appears well-developed and well-nourished. She is not diaphoretic. No distress.   HENT:   Head: Normocephalic and atraumatic.   Right peritonsillar swelling   Eyes: EOM are normal. Pupils are equal, round, and reactive to light.   Neck: Neck supple.   Normal range of motion.  Cardiovascular: Normal rate, regular rhythm and intact distal pulses.   Pulmonary/Chest: Breath sounds normal. No respiratory distress. She has no wheezes. She has no rales.   Abdominal: Abdomen is soft. Bowel sounds are normal. She exhibits no distension. There is no abdominal tenderness. There is no rebound.   Musculoskeletal:         General: No tenderness or edema. Normal range of motion.      Cervical back: Normal range of motion and neck supple.     Lymphadenopathy:     She has no cervical adenopathy.   Neurological: She is alert and oriented to person, place, and time.   Skin: Skin is warm and dry. No rash noted.         ED Course   Procedures  Labs Reviewed   CBC W/ AUTO DIFFERENTIAL - Abnormal; Notable for the following components:       Result Value    RBC 3.47 (*)     Hemoglobin 10.5 (*)     Hematocrit 31.8 (*)     Lymph # 0.9 (*)     All other components within normal limits   COMPREHENSIVE METABOLIC PANEL - Abnormal; Notable for the following components:    Glucose 142 (*)     Albumin 3.1 (*)     ALT 50 (*)     Anion Gap 5 (*)     All other components within normal limits   GROUP A STREP, MOLECULAR   SARS-COV-2 RDRP GENE          Imaging Results    None          Medications   dexamethasone injection 8 mg (has no administration in time  range)   clindamycin injection 600 mg (has no administration in time range)     Medical Decision Making:   Clinical Tests:   Lab Tests: Ordered and Reviewed  ED Management:  Leukocytosis resolved, no fever, exam consistent with prior imaging. Will give another dose of decadron, have patient f/u with ENT.                       Clinical Impression:   Final diagnoses:  [J02.9] Pharyngitis, unspecified etiology (Primary)          ED Disposition Condition    Discharge Stable        ED Prescriptions     None        Follow-up Information     Follow up With Specialties Details Why Contact Info    David Brizuela MD Otolaryngology Schedule an appointment as soon as possible for a visit   [address]  [address]  [address] [address]  [phone]             Casimiro Barnhart MD  02/02/22 0572

## 2022-07-27 ENCOUNTER — HOSPITAL ENCOUNTER (EMERGENCY)
Facility: HOSPITAL | Age: 62
Discharge: HOME | End: 2022-07-27
Attending: EMERGENCY MEDICINE
Payer: MEDICAID

## 2022-07-27 VITALS
BODY MASS INDEX: 33.32 KG/M2 | RESPIRATION RATE: 18 BRPM | TEMPERATURE: 98 F | DIASTOLIC BLOOD PRESSURE: 90 MMHG | WEIGHT: 200 LBS | HEIGHT: 65 IN | SYSTOLIC BLOOD PRESSURE: 186 MMHG | HEART RATE: 68 BPM | OXYGEN SATURATION: 98 %

## 2022-07-27 DIAGNOSIS — S91.104A OPEN WOUND OF THIRD TOE OF RIGHT FOOT, INITIAL ENCOUNTER: ICD-10-CM

## 2022-07-27 DIAGNOSIS — K64.9 HEMORRHOIDS, UNSPECIFIED HEMORRHOID TYPE: Primary | ICD-10-CM

## 2022-07-27 PROCEDURE — 99284 EMERGENCY DEPT VISIT MOD MDM: CPT

## 2022-07-27 PROCEDURE — 25000003 PHARM REV CODE 250: Performed by: EMERGENCY MEDICINE

## 2022-07-27 RX ORDER — CEPHALEXIN 500 MG/1
500 CAPSULE ORAL 4 TIMES DAILY
Qty: 20 CAPSULE | Refills: 0 | Status: SHIPPED | OUTPATIENT
Start: 2022-07-27 | End: 2022-08-01

## 2022-07-27 RX ORDER — MUPIROCIN 20 MG/G
OINTMENT TOPICAL 3 TIMES DAILY
Qty: 15 G | Refills: 0 | Status: SHIPPED | OUTPATIENT
Start: 2022-07-27 | End: 2023-04-12

## 2022-07-27 RX ADMIN — BACITRACIN, NEOMYCIN, POLYMYXIN B 1 EACH: 400; 3.5; 5 OINTMENT TOPICAL at 11:07

## 2022-07-27 NOTE — DISCHARGE INSTRUCTIONS
Read all discharge instructions/education provided: follow all recommendations and return precautions.  Take all medications as prescribed.  Follow up with your primary care doctor and/or specialist as directed.  Return to emergency department immediately for any new or worsening or recurrent symptoms.

## 2022-07-28 NOTE — ED PROVIDER NOTES
Encounter Date: 2022       History     Chief Complaint   Patient presents with    Sore     Patient c/o sores on her buttocks.     Toe Injury     Patient states she cut her 3rd right toe this morning. Patient has Diabetes.      62 year-old female with a history of CVA, typically in a wheelchair but can ambulate with assistance, comes in with family who is concerned about a cut to her 3rd right toe because she has diabetes and also a sore to her rectum.  They have used preparation H but the rectal mass has not gone away.  Patient does not recall hurting her foot but the nail is torn off of the right toe and there is scant blood.  No deformity.  Bleeding is controlled.  Patient has no complaints of abdominal pain vomiting diarrhea blood in stool or any other complaints at this time.        Review of patient's allergies indicates:   Allergen Reactions    Levemir [insulin detemir] Other (See Comments)     Sob rash to arms and chest  abd cramping diarrhea    Lisinopril Swelling    Shellfish containing products      Other^asthma    Losartan Other (See Comments)     Muscle cramp     Past Medical History:   Diagnosis Date    Asthma     Cirrhosis of liver without mention of alcohol     CVA (cerebral vascular accident) 2021    Diabetes mellitus, type 2     Gout, unspecified     Hyperlipidemia     Hypertension     Obesity, unspecified     Unspecified vitamin D deficiency      Past Surgical History:   Procedure Laterality Date     SECTION  , ,  1985     x3    CHOLECYSTECTOMY  2011    COLONOSCOPY N/A 2017    Procedure: COLONOSCOPY;  Surgeon: Luis Bogran-Reyes, MD;  Location: [facility];  Service: Endoscopy;  Laterality: N/A;    HYSTERECTOMY      for uterine cancer     Family History   Problem Relation Age of Onset    Alzheimer's disease Mother     Stroke Mother     Hypertension Mother     Heart attack Father     Hypertension Father     Diabetes Maternal Grandmother       Social History     Tobacco Use    Smoking status: Former Smoker     Quit date: 1979     Years since quittin.5    Smokeless tobacco: Never Used   Substance Use Topics    Alcohol use: No    Drug use: No     Review of Systems   Constitutional: Negative for fever.   HENT: Negative for sore throat.    Respiratory: Negative for shortness of breath.    Cardiovascular: Negative for chest pain.   Gastrointestinal: Negative for abdominal pain, blood in stool, nausea and vomiting.        Hemorrhoid   Genitourinary: Negative for dysuria.   Musculoskeletal: Negative for back pain.   Skin: Negative for rash.        Third right toe/nail injury   Neurological: Negative for weakness.   Hematological: Does not bruise/bleed easily.   All other systems reviewed and are negative.      Physical Exam     Initial Vitals   BP Pulse Resp Temp SpO2   22 1052 22 1052 22 1051 22 1051 22 1051   (!) 186/90 68 18 98.4 °F (36.9 °C) 98 %      MAP       --                Physical Exam    Nursing note and vitals reviewed.  Constitutional: She appears well-developed and well-nourished. She is not diaphoretic. No distress.   HENT:   Head: Normocephalic and atraumatic.   Eyes: EOM are normal. Pupils are equal, round, and reactive to light.   Neck: Neck supple.   Normal range of motion.  Cardiovascular: Normal rate and regular rhythm.   Pulmonary/Chest: Breath sounds normal. She has no wheezes.   Abdominal: Abdomen is soft. Bowel sounds are normal. There is no abdominal tenderness.   Genitourinary:    Genitourinary Comments: External hemorrhoid     Musculoskeletal:         General: No edema. Normal range of motion.      Cervical back: Normal range of motion and neck supple.     Neurological: She is alert and oriented to person, place, and time. She has normal strength. No cranial nerve deficit or sensory deficit.   Skin: Skin is warm and dry.   Third right toe nail avulsion and scant blood; no open laceration  to repair, no deformity, neurovascularly intact   Psychiatric: She has a normal mood and affect. Thought content normal.         ED Course   Procedures  Labs Reviewed - No data to display       Imaging Results    None          Medications   neomycin-bacitracnZn-polymyxnB packet (1 each Topical (Top) Given 7/27/22 1120)                Attending Attestation:             Attending ED Notes:   62 year-old female with a history of CVA, typically in a wheelchair but can ambulate with assistance, comes in with family who is concerned about a cut to her 3rd right toe because she has diabetes and also a sore to her rectum.  They have used preparation H but the rectal mass has not gone away.  Patient does not recall hurting her foot but the nail is torn off of the right toe and there is scant blood.  No deformity.  Bleeding is controlled.  No lymphangitic streaking or evidence of abscess or cellulitis to the foot. Patient has no complaints of abdominal pain vomiting diarrhea blood in stool or any other complaints at this time.    Presentation consistent with external hemorrhoids and a superficial abrasion to the 3rd right toe.  The toe was cleaned and triple antibiotic ointment applied and superficial wound dressed.  Prescriptions given for Proctofoam, Bactroban, Keflex and instructions to follow-up with general surgery for hemorrhoids and primary care for further evaluation and management.    Patient is non-toxic appearing, in no acute distress, and vital signs are stable and normal upon discharge. Upon completion of ED evaluation and management, with consideration of thorough differential diagnosis, the patient was found to have no acutely abnormal physical exam findings or other pathology requiring further emergent intervention or admission at this time. Patient/caregiver has no complaints upon discharge and verbalizes understanding and agreement with diagnosis and treatment plan. Discharge instructions with return  precautions provided. Patient/caregiver verbalizes understanding to return to ED immediately for any new or worsening symptoms and to follow up with PCP/specialist recommended in 1-2 days.                      Clinical Impression:   Final diagnoses:  [K64.9] Hemorrhoids, unspecified hemorrhoid type (Primary)  [S91.104A] Open wound of third toe of right foot, initial encounter          ED Disposition Condition    Discharge Stable        ED Prescriptions     Medication Sig Dispense Start Date End Date Auth. Provider    mupirocin (BACTROBAN) 2 % ointment Apply topically 3 (three) times daily. 15 g 7/27/2022  Francesca Sanchez MD    hydrocortisone-pramoxine (PROCTOFOAM-HS) rectal foam Place 1 applicator rectally 2 (two) times daily. 10 g 7/27/2022  Francesca Sanchez MD    cephALEXin (KEFLEX) 500 MG capsule Take 1 capsule (500 mg total) by mouth 4 (four) times daily. for 5 days 20 capsule 7/27/2022 8/1/2022 Francesca Sanchez MD        Follow-up Information     Follow up With Specialties Details Why Contact Info Additional Information    [facility], Internal Medicine, Gynecology, Dental General Practice Schedule an appointment as soon as possible for a visit   [address] [place] [id number]  [phone]       Genie Bruno MD General Surgery Schedule an appointment as soon as possible for a visit  As needed, If symptoms worsen (general surgeon-hemorrhoids) [address] [facility]  [address]  [facility] [address] [phone]       [place] - Emergency Department Emergency Medicine Go to  As needed, If symptoms worsen [address] [facility] [id number] [phone] Floor 1           Francesca Sanchez MD  07/28/22 0586

## 2022-10-04 ENCOUNTER — HOSPITAL ENCOUNTER (OUTPATIENT)
Dept: RADIOLOGY | Facility: HOSPITAL | Age: 62
Discharge: HOME | End: 2022-10-04
Attending: NURSE PRACTITIONER
Payer: MEDICAID

## 2022-10-04 VITALS — WEIGHT: 200 LBS | HEIGHT: 65 IN | BODY MASS INDEX: 33.32 KG/M2

## 2022-10-04 DIAGNOSIS — Z12.31 SCREENING MAMMOGRAM FOR BREAST CANCER: ICD-10-CM

## 2022-10-04 PROCEDURE — 77067 SCR MAMMO BI INCL CAD: CPT | Mod: TC

## 2023-03-01 LAB
LEFT EYE DM RETINOPATHY: NEGATIVE
RIGHT EYE DM RETINOPATHY: NEGATIVE

## 2023-03-08 LAB
ALBUMIN CREATININE RATIO: 19 MG/G
ALBUMIN, URINE: 12.9 MG/L
CREATININE, RANDOM URINE: 66.4

## 2023-04-12 ENCOUNTER — OFFICE VISIT (OUTPATIENT)
Dept: PRIMARY CARE CLINIC | Facility: CLINIC | Age: 63
End: 2023-04-12
Payer: MEDICAID

## 2023-04-12 VITALS
HEIGHT: 64 IN | TEMPERATURE: 98 F | DIASTOLIC BLOOD PRESSURE: 67 MMHG | BODY MASS INDEX: 38.76 KG/M2 | SYSTOLIC BLOOD PRESSURE: 153 MMHG | WEIGHT: 227 LBS | RESPIRATION RATE: 16 BRPM | HEART RATE: 60 BPM | OXYGEN SATURATION: 97 %

## 2023-04-12 DIAGNOSIS — E11.29 TYPE 2 DIABETES MELLITUS WITH MICROALBUMINURIA, WITH LONG-TERM CURRENT USE OF INSULIN: ICD-10-CM

## 2023-04-12 DIAGNOSIS — G47.9 SLEEPING DIFFICULTIES: ICD-10-CM

## 2023-04-12 DIAGNOSIS — I10 ESSENTIAL HYPERTENSION: Primary | ICD-10-CM

## 2023-04-12 DIAGNOSIS — E55.9 VITAMIN D DEFICIENCY: ICD-10-CM

## 2023-04-12 DIAGNOSIS — E11.69 DYSLIPIDEMIA ASSOCIATED WITH TYPE 2 DIABETES MELLITUS: ICD-10-CM

## 2023-04-12 DIAGNOSIS — E66.01 CLASS 2 SEVERE OBESITY WITH SERIOUS COMORBIDITY AND BODY MASS INDEX (BMI) OF 39.0 TO 39.9 IN ADULT, UNSPECIFIED OBESITY TYPE: ICD-10-CM

## 2023-04-12 DIAGNOSIS — R80.9 TYPE 2 DIABETES MELLITUS WITH MICROALBUMINURIA, WITH LONG-TERM CURRENT USE OF INSULIN: ICD-10-CM

## 2023-04-12 DIAGNOSIS — I63.312 CEREBROVASCULAR ACCIDENT (CVA) DUE TO THROMBOSIS OF LEFT MIDDLE CEREBRAL ARTERY: ICD-10-CM

## 2023-04-12 DIAGNOSIS — Z79.4 TYPE 2 DIABETES MELLITUS WITH MICROALBUMINURIA, WITH LONG-TERM CURRENT USE OF INSULIN: ICD-10-CM

## 2023-04-12 DIAGNOSIS — E78.5 DYSLIPIDEMIA ASSOCIATED WITH TYPE 2 DIABETES MELLITUS: ICD-10-CM

## 2023-04-12 PROCEDURE — 3078F PR MOST RECENT DIASTOLIC BLOOD PRESSURE < 80 MM HG: ICD-10-PCS | Mod: CPTII,,, | Performed by: STUDENT IN AN ORGANIZED HEALTH CARE EDUCATION/TRAINING PROGRAM

## 2023-04-12 PROCEDURE — 4010F PR ACE/ARB THEARPY RXD/TAKEN: ICD-10-PCS | Mod: CPTII,,, | Performed by: STUDENT IN AN ORGANIZED HEALTH CARE EDUCATION/TRAINING PROGRAM

## 2023-04-12 PROCEDURE — 3077F PR MOST RECENT SYSTOLIC BLOOD PRESSURE >= 140 MM HG: ICD-10-PCS | Mod: CPTII,,, | Performed by: STUDENT IN AN ORGANIZED HEALTH CARE EDUCATION/TRAINING PROGRAM

## 2023-04-12 PROCEDURE — 1160F RVW MEDS BY RX/DR IN RCRD: CPT | Mod: CPTII,,, | Performed by: STUDENT IN AN ORGANIZED HEALTH CARE EDUCATION/TRAINING PROGRAM

## 2023-04-12 PROCEDURE — 99215 OFFICE O/P EST HI 40 MIN: CPT | Mod: PBBFAC,PN | Performed by: STUDENT IN AN ORGANIZED HEALTH CARE EDUCATION/TRAINING PROGRAM

## 2023-04-12 PROCEDURE — 1160F PR REVIEW ALL MEDS BY PRESCRIBER/CLIN PHARMACIST DOCUMENTED: ICD-10-PCS | Mod: CPTII,,, | Performed by: STUDENT IN AN ORGANIZED HEALTH CARE EDUCATION/TRAINING PROGRAM

## 2023-04-12 PROCEDURE — 3008F PR BODY MASS INDEX (BMI) DOCUMENTED: ICD-10-PCS | Mod: CPTII,,, | Performed by: STUDENT IN AN ORGANIZED HEALTH CARE EDUCATION/TRAINING PROGRAM

## 2023-04-12 PROCEDURE — 99999 PR PBB SHADOW E&M-EST. PATIENT-LVL V: CPT | Mod: PBBFAC,,, | Performed by: STUDENT IN AN ORGANIZED HEALTH CARE EDUCATION/TRAINING PROGRAM

## 2023-04-12 PROCEDURE — 3008F BODY MASS INDEX DOCD: CPT | Mod: CPTII,,, | Performed by: STUDENT IN AN ORGANIZED HEALTH CARE EDUCATION/TRAINING PROGRAM

## 2023-04-12 PROCEDURE — 99204 PR OFFICE/OUTPT VISIT, NEW, LEVL IV, 45-59 MIN: ICD-10-PCS | Mod: S$PBB,,, | Performed by: STUDENT IN AN ORGANIZED HEALTH CARE EDUCATION/TRAINING PROGRAM

## 2023-04-12 PROCEDURE — 99999 PR PBB SHADOW E&M-EST. PATIENT-LVL V: ICD-10-PCS | Mod: PBBFAC,,, | Performed by: STUDENT IN AN ORGANIZED HEALTH CARE EDUCATION/TRAINING PROGRAM

## 2023-04-12 PROCEDURE — 3077F SYST BP >= 140 MM HG: CPT | Mod: CPTII,,, | Performed by: STUDENT IN AN ORGANIZED HEALTH CARE EDUCATION/TRAINING PROGRAM

## 2023-04-12 PROCEDURE — 4010F ACE/ARB THERAPY RXD/TAKEN: CPT | Mod: CPTII,,, | Performed by: STUDENT IN AN ORGANIZED HEALTH CARE EDUCATION/TRAINING PROGRAM

## 2023-04-12 PROCEDURE — 99204 OFFICE O/P NEW MOD 45 MIN: CPT | Mod: S$PBB,,, | Performed by: STUDENT IN AN ORGANIZED HEALTH CARE EDUCATION/TRAINING PROGRAM

## 2023-04-12 PROCEDURE — 1159F PR MEDICATION LIST DOCUMENTED IN MEDICAL RECORD: ICD-10-PCS | Mod: CPTII,,, | Performed by: STUDENT IN AN ORGANIZED HEALTH CARE EDUCATION/TRAINING PROGRAM

## 2023-04-12 PROCEDURE — 3078F DIAST BP <80 MM HG: CPT | Mod: CPTII,,, | Performed by: STUDENT IN AN ORGANIZED HEALTH CARE EDUCATION/TRAINING PROGRAM

## 2023-04-12 PROCEDURE — 1159F MED LIST DOCD IN RCRD: CPT | Mod: CPTII,,, | Performed by: STUDENT IN AN ORGANIZED HEALTH CARE EDUCATION/TRAINING PROGRAM

## 2023-04-12 RX ORDER — LORATADINE 10 MG/1
1 TABLET ORAL
COMMUNITY

## 2023-04-12 RX ORDER — CALCIUM CARBONATE 500(1250)
1 TABLET ORAL 2 TIMES DAILY
Qty: 60 TABLET | Refills: 11 | Status: SHIPPED | OUTPATIENT
Start: 2023-04-12 | End: 2024-03-13

## 2023-04-12 RX ORDER — CHOLECALCIFEROL (VITAMIN D3) 1250 MCG
CAPSULE ORAL
COMMUNITY
Start: 2023-03-09 | End: 2023-10-18 | Stop reason: SDUPTHER

## 2023-04-12 RX ORDER — AMLODIPINE BESYLATE 10 MG/1
10 TABLET ORAL
COMMUNITY
Start: 2023-03-13 | End: 2023-06-23

## 2023-04-12 RX ORDER — ASPIRIN 325 MG
50000 TABLET, DELAYED RELEASE (ENTERIC COATED) ORAL
Qty: 4 CAPSULE | Refills: 4 | Status: SHIPPED | OUTPATIENT
Start: 2023-04-12 | End: 2023-08-10

## 2023-04-12 RX ORDER — DULAGLUTIDE 0.75 MG/.5ML
INJECTION, SOLUTION SUBCUTANEOUS
COMMUNITY
Start: 2023-03-20 | End: 2023-11-09

## 2023-04-12 RX ORDER — EZETIMIBE 10 MG/1
10 TABLET ORAL DAILY
Qty: 90 TABLET | Refills: 3 | Status: SHIPPED | OUTPATIENT
Start: 2023-04-12 | End: 2024-03-13

## 2023-04-12 RX ORDER — GABAPENTIN 300 MG/1
1 CAPSULE ORAL
COMMUNITY
End: 2023-04-12 | Stop reason: SDUPTHER

## 2023-04-12 RX ORDER — GABAPENTIN 300 MG/1
300 CAPSULE ORAL NIGHTLY
Qty: 30 CAPSULE | Refills: 11 | Status: SHIPPED | OUTPATIENT
Start: 2023-04-12 | End: 2024-03-13 | Stop reason: SDUPTHER

## 2023-04-12 RX ORDER — EMPAGLIFLOZIN 25 MG/1
25 TABLET, FILM COATED ORAL
COMMUNITY
Start: 2023-04-03 | End: 2023-06-01 | Stop reason: SDUPTHER

## 2023-04-12 RX ORDER — AZILSARTAN KAMEDOXOMIL 80 MG/1
80 TABLET ORAL DAILY
Qty: 30 TABLET | Refills: 2 | Status: SHIPPED | OUTPATIENT
Start: 2023-04-12 | End: 2023-07-11

## 2023-04-12 NOTE — PATIENT INSTRUCTIONS
Please make follow up appointments with your heart specialist and stroke specialists.   During your visits, please discuss duration of current antiplatelet therapy (aspirin 325 mg and plavix 75 mg).   Follow Elimination diet as discussed to help build a healthier gut microbiome.

## 2023-04-12 NOTE — ASSESSMENT & PLAN NOTE
Recommendations:   Stay physically active. As tolerated alternate resistance training with stretching and cardio. Goal of 150 minutes per week of moderate intensity activity or 7,500 - 10,000 steps per day. Follow the Mediterranean Diet. Include whole fresh fruits, vegetables, olive oil, seeds, nuts, whole grains, cold water fish, salmon, mackerel and lean cuts of meat.  Do not drink sugary/diet carbonated beverages. Decrease portion sizes slightly which will result in an approximately 500-calorie deficit. Avoid fast or fried and processed food, especially canned foods. Avoid refined carbohydrates, white starchy foods, flour, white potato, bread, muffins, and cakes. Consider substituting one meal a day with a meal replacement such as Slim fast, lean cuisine, or weight watcher's. Follow a healthy diet that includes enough calcium, vitamin D and proteins for bone health.

## 2023-04-12 NOTE — PROGRESS NOTES
"[facility] Note    HPI:  Lisa Hendricks is a 63 y.o. female who presents today for Establish Care (Patient referred by [facility] for higher level of care)  63 year old female with hypertension, dyslipidemia, insulin dependent diabetes mellitus type 2, vitamin D deficiency, chronic low back pain, chronic pain syndrome, gout, history of CVA. Denies fever, chills, excessive headaches, vision changes, chest pain, palpitations, shortness of breath, abdominal pain, nausea, vomiting, constipation, diarrhea.     ROS   A review of systems was performed and was negative except as noted above.    I personally reviewed allergies, past medical, surgical, social and family history and updated as appropriate.    Medications:    Current Outpatient Medications:     albuterol (PROVENTIL/VENTOLIN HFA) 90 mcg/actuation inhaler, Inhale 2 puffs into the lungs every 6 (six) hours as needed for Wheezing. Rescue, Disp: , Rfl:     amLODIPine (NORVASC) 10 MG tablet, Take 10 mg by mouth., Disp: , Rfl:     aspirin 325 MG tablet, Take 325 mg by mouth once daily., Disp: , Rfl:     atorvastatin (LIPITOR) 80 MG tablet, Take 80 mg by mouth once daily., Disp: , Rfl:     blood sugar diagnostic Strp, 1 strip by Misc.(Non-Drug; Combo Route) route 3 (three) times daily., Disp: , Rfl:     clopidogreL (PLAVIX) 75 mg tablet, , Disp: , Rfl:     DECARA 1,250 mcg (50,000 unit) capsule, TAKE 1 CAPSULE BY MOUTH ONCE A WEEK. TAKE OTC VITAMIN D3 1000 UNITS DAILY WHEN YOU FINISH THIS PRESCRIPTION, Disp: , Rfl:     hydrocortisone-pramoxine (PROCTOFOAM-HS) rectal foam, Place 1 applicator rectally 2 (two) times daily., Disp: 10 g, Rfl: 0    insulin needles, disposable, 32 x 1/4 " Ndle, 1 Device by Misc.(Non-Drug; Combo Route) route 4 (four) times daily., Disp: , Rfl:     JARDIANCE 25 mg tablet, Take 25 mg by mouth., Disp: , Rfl:     LANCETS MISC, 1 Act by Misc.(Non-Drug; Combo Route) route 3 (three) times daily., Disp: , Rfl:     loratadine (CLARITIN) 10 mg " tablet, 1 tablet., Disp: , Rfl:     metoprolol tartrate (LOPRESSOR) 25 MG tablet, Take 25 mg by mouth 2 (two) times daily., Disp: , Rfl:     polyethylene glycol (GLYCOLAX) 17 gram PwPk, Take by mouth., Disp: , Rfl:     TRULICITY 0.75 mg/0.5 mL pen injector, ADMINISTER 0.75 MG UNDER THE SKIN 1 TIME A WEEK, Disp: , Rfl:     azilsartan medoxomiL (EDARBI) 80 mg Tab, Take 80 mg by mouth once daily., Disp: 30 tablet, Rfl: 2    calcium carbonate (OS-ALIYN) 500 mg calcium (1,250 mg) tablet, Take 1 tablet (500 mg total) by mouth 2 (two) times daily., Disp: 60 tablet, Rfl: 11    cholecalciferol, vitamin D3, 1,250 mcg (50,000 unit) capsule, Take 1 capsule (50,000 Units total) by mouth every 7 days., Disp: 4 capsule, Rfl: 4    ezetimibe (ZETIA) 10 mg tablet, Take 1 tablet (10 mg total) by mouth once daily., Disp: 90 tablet, Rfl: 3    gabapentin (NEURONTIN) 300 MG capsule, Take 1 capsule (300 mg total) by mouth every evening., Disp: 30 capsule, Rfl: 11    insulin aspart (NOVOLOG FLEXPEN) 100 unit/mL InPn pen, Inject 40 Units into the skin 3 (three) times daily before meals. (Patient taking differently: Inject 10 Units into the skin 3 (three) times daily before meals.), Disp: 108 mL, Rfl: 3     Health Maintenance:  Immunization History   Administered Date(s) Administered    DTP 1962, 1962, 1962, 07/10/1968    OPV 1962, 1962, 02/07/1963, 08/08/1968    Td (ADULT) 02/28/1979, 08/28/2007      Health Maintenance   Topic Date Due    TETANUS VACCINE  08/28/2017    Eye Exam  02/05/2020    Lipid Panel  11/06/2021    Foot Exam  11/22/2022    Hemoglobin A1c  06/12/2023    Mammogram  10/04/2023    Hepatitis C Screening  Completed     Health Maintenance Topics with due status: Not Due       Topic Last Completion Date    Colorectal Cancer Screening 07/13/2017    Mammogram 10/04/2022    Hemoglobin A1c 12/12/2022     Health Maintenance Due   Topic Date Due    Pneumococcal Vaccines (Age 0-64) (1 - PCV) Never done     "HIV Screening  Never done    Shingles Vaccine (1 of 2) Never done    TETANUS VACCINE  08/28/2017    Diabetes Urine Screening  10/26/2017    Eye Exam  02/05/2020    Lipid Panel  11/06/2021    Foot Exam  11/22/2022       PHYSICAL EXAM:  Vitals:    04/12/23 1031 04/12/23 1045 04/12/23 1047   BP: (!) 181/81 (!) 158/69 (!) 153/67   BP Location: Left arm     Patient Position: Sitting     BP Method: Large (Automatic)     Pulse: 60     Resp: 16     Temp: 98.4 °F (36.9 °C)     TempSrc: Oral     SpO2: 97%     Weight: 103 kg (227 lb)     Height: 5' 4" (1.626 m)       Body mass index is 38.96 kg/m².  Physical Exam  Vitals reviewed.   Constitutional:       General: She is not in acute distress.     Appearance: Normal appearance. She is normal weight. She is not ill-appearing or toxic-appearing.   HENT:      Head: Normocephalic and atraumatic.      Right Ear: External ear normal.      Left Ear: External ear normal.      Nose: Nose normal.      Mouth/Throat:      Mouth: Mucous membranes are moist.      Pharynx: Oropharynx is clear.   Eyes:      Extraocular Movements: Extraocular movements intact.      Conjunctiva/sclera: Conjunctivae normal.   Cardiovascular:      Rate and Rhythm: Normal rate and regular rhythm.      Pulses: Normal pulses.      Heart sounds: Normal heart sounds.   Pulmonary:      Effort: Pulmonary effort is normal. No respiratory distress.      Breath sounds: No stridor. No wheezing, rhonchi or rales.   Abdominal:      General: Abdomen is flat. Bowel sounds are normal.      Palpations: Abdomen is soft.   Musculoskeletal:         General: No tenderness. Normal range of motion.      Cervical back: Normal range of motion and neck supple. No rigidity or tenderness.   Skin:     General: Skin is warm and dry.      Capillary Refill: Capillary refill takes less than 2 seconds.   Neurological:      General: No focal deficit present.      Mental Status: She is alert and oriented to person, place, and time. Mental status is " at baseline.      Motor: No weakness.      Gait: Gait normal.   Psychiatric:         Mood and Affect: Mood normal.         Behavior: Behavior normal.         Thought Content: Thought content normal.         Judgment: Judgment normal.        ASSESSMENT/PLAN:  1. Essential hypertension  -     azilsartan medoxomiL (EDARBI) 80 mg Tab; Take 80 mg by mouth once daily.  Dispense: 30 tablet; Refill: 2  -     Hypertension Digital Medicine (HDMP) Enrollment Order  -     Hypertension Digital Medicine (HDMP): Assign Onboarding Questionnaires    2. Sleeping difficulties  -     gabapentin (NEURONTIN) 300 MG capsule; Take 1 capsule (300 mg total) by mouth every evening.  Dispense: 30 capsule; Refill: 11    3. Vitamin D deficiency  -     cholecalciferol, vitamin D3, 1,250 mcg (50,000 unit) capsule; Take 1 capsule (50,000 Units total) by mouth every 7 days.  Dispense: 4 capsule; Refill: 4  -     calcium carbonate (OS-AILYN) 500 mg calcium (1,250 mg) tablet; Take 1 tablet (500 mg total) by mouth 2 (two) times daily.  Dispense: 60 tablet; Refill: 11    4. Dyslipidemia associated with type 2 diabetes mellitus  -     ezetimibe (ZETIA) 10 mg tablet; Take 1 tablet (10 mg total) by mouth once daily.  Dispense: 90 tablet; Refill: 3  -     Lipids Digital Medicine (LDMP) Enrollment Order  -     Lipids Digital Medicine (LDMP): Assign Onboarding Questionnaires    5. Type 2 diabetes mellitus with microalbuminuria, with long-term current use of insulin  -     Diabetes Digital Medicine (DDMP) Enrollment Order  -     Diabetes Digital Medicine (DDMP): Assign Onboarding Questionnaires    6. Class 2 severe obesity with serious comorbidity and body mass index (BMI) of 39.0 to 39.9 in adult, unspecified obesity type  Overview:  Wt Readings from Last 8 Encounters:   04/12/23 103 kg (227 lb)   10/04/22 90.7 kg (200 lb)   07/27/22 90.7 kg (200 lb)   02/02/22 80.3 kg (177 lb)   01/29/22 80.3 kg (177 lb)   11/30/21 80.3 kg (177 lb)   11/04/20 108 kg (238 lb  1.6 oz)   11/02/20 108 kg (238 lb)       Assessment & Plan:  Recommendations:   Stay physically active. As tolerated alternate resistance training with stretching and cardio. Goal of 150 minutes per week of moderate intensity activity or 7,500 - 10,000 steps per day. Follow the Mediterranean Diet. Include whole fresh fruits, vegetables, olive oil, seeds, nuts, whole grains, cold water fish, salmon, mackerel and lean cuts of meat.  Do not drink sugary/diet carbonated beverages. Decrease portion sizes slightly which will result in an approximately 500-calorie deficit. Avoid fast or fried and processed food, especially canned foods. Avoid refined carbohydrates, white starchy foods, flour, white potato, bread, muffins, and cakes. Consider substituting one meal a day with a meal replacement such as Slim fast, lean cuisine, or weight watcher's. Follow a healthy diet that includes enough calcium, vitamin D and proteins for bone health.        7. Cerebrovascular accident (CVA) due to thrombosis of left middle cerebral artery  Assessment & Plan:  Currently taking aspirin 325 mg and plavix 75 mg daily. Denies any bleeding history or easy bruising.           Other than changes above, continue current medications and maintain follow up with specialists.      Follow up in about 4 weeks (around 5/10/2023) for BP check, Med recheck.   No results found for this or any previous visit (from the past 2016 hour(s)).      Kazumi G Yoshinaga, DO [facility]

## 2023-04-16 PROBLEM — E78.5 DYSLIPIDEMIA ASSOCIATED WITH TYPE 2 DIABETES MELLITUS: Status: ACTIVE | Noted: 2017-06-01

## 2023-04-16 PROBLEM — G47.9 SLEEPING DIFFICULTIES: Status: ACTIVE | Noted: 2023-04-16

## 2023-04-16 PROBLEM — E11.69 DYSLIPIDEMIA ASSOCIATED WITH TYPE 2 DIABETES MELLITUS: Status: ACTIVE | Noted: 2017-06-01

## 2023-04-17 NOTE — ASSESSMENT & PLAN NOTE
Currently taking aspirin 325 mg and plavix 75 mg daily. Denies any bleeding history or easy bruising.

## 2023-04-20 ENCOUNTER — TELEPHONE (OUTPATIENT)
Dept: PRIMARY CARE CLINIC | Facility: CLINIC | Age: 63
End: 2023-04-20
Payer: MEDICAID

## 2023-04-20 NOTE — TELEPHONE ENCOUNTER
Patient called requesting a refill for her Novolog Flexpen 100 units/mL. It's ordered as 10 units 3x daily before meals. She said it was mentioned to you last week at her appt.

## 2023-04-21 DIAGNOSIS — E11.69 DIABETES MELLITUS TYPE 2 IN OBESE: ICD-10-CM

## 2023-04-21 DIAGNOSIS — E66.9 DIABETES MELLITUS TYPE 2 IN OBESE: ICD-10-CM

## 2023-04-21 RX ORDER — INSULIN ASPART 100 [IU]/ML
10 INJECTION, SOLUTION INTRAVENOUS; SUBCUTANEOUS
Qty: 9 ML | Refills: 2 | Status: SHIPPED | OUTPATIENT
Start: 2023-04-21 | End: 2023-06-23

## 2023-06-01 DIAGNOSIS — E11.9 TYPE 2 DIABETES MELLITUS WITHOUT OPHTHALMIC MANIFESTATIONS: Primary | ICD-10-CM

## 2023-06-01 RX ORDER — EMPAGLIFLOZIN 25 MG/1
25 TABLET, FILM COATED ORAL DAILY
Qty: 30 TABLET | Refills: 2 | Status: SHIPPED | OUTPATIENT
Start: 2023-06-01 | End: 2023-08-29

## 2023-06-13 ENCOUNTER — PATIENT MESSAGE (OUTPATIENT)
Dept: ADMINISTRATIVE | Facility: HOSPITAL | Age: 63
End: 2023-06-13
Payer: MEDICAID

## 2023-06-14 DIAGNOSIS — E11.9 TYPE 2 DIABETES MELLITUS WITHOUT COMPLICATION: ICD-10-CM

## 2023-06-14 DIAGNOSIS — E11.69 DYSLIPIDEMIA ASSOCIATED WITH TYPE 2 DIABETES MELLITUS: ICD-10-CM

## 2023-06-14 DIAGNOSIS — E78.5 DYSLIPIDEMIA ASSOCIATED WITH TYPE 2 DIABETES MELLITUS: ICD-10-CM

## 2023-06-21 DIAGNOSIS — E11.69 DIABETES MELLITUS TYPE 2 IN OBESE: ICD-10-CM

## 2023-06-21 DIAGNOSIS — I10 ESSENTIAL HYPERTENSION: ICD-10-CM

## 2023-06-21 DIAGNOSIS — I63.312 CEREBROVASCULAR ACCIDENT (CVA) DUE TO THROMBOSIS OF LEFT MIDDLE CEREBRAL ARTERY: Primary | ICD-10-CM

## 2023-06-21 DIAGNOSIS — E66.9 DIABETES MELLITUS TYPE 2 IN OBESE: ICD-10-CM

## 2023-06-21 NOTE — TELEPHONE ENCOUNTER
6/13/2023         RE: Darlene Hudson  [address] [place] [address]        Dear Colleague,    Thank you for referring your patient, Darlene Hudson, to the [facility] [place]. Please see a copy of my visit note below.    Chief complaint right ankle soft tissue impingement    Darlene Hudson presents today for further follow-up.  Unfortunately she could not proceed with the surgery on the right ankle because of being diagnosed with cancer which is currently in remission.    Into the exam she presents with a full range of motion of the right ankle hindfoot and midfoot joints skin is intact skin is intact presents with a stable exam to lateral ligament complex.    Assessment right ankle soft tissue impingement    Plan I discussed with the patient the most likely postoperative course and complications from undergoing a right ankle arthroscopic debridement.  Complications included but not limited to infection bleeding nerve damage residual pain and stiffness    All questions were answered.  Patient will schedule surgery at the base of her convenience.  In the meantime she has no restrictions.    TT 20 minutes        Kareem Bashir MD     Please see the attached refill request.

## 2023-06-23 RX ORDER — AMLODIPINE BESYLATE 10 MG/1
10 TABLET ORAL DAILY
Qty: 30 TABLET | Refills: 0 | Status: SHIPPED | OUTPATIENT
Start: 2023-06-23 | End: 2023-07-10 | Stop reason: SDUPTHER

## 2023-06-23 RX ORDER — INSULIN ASPART 100 [IU]/ML
INJECTION, SOLUTION INTRAVENOUS; SUBCUTANEOUS
Qty: 9 ML | Refills: 2 | Status: SHIPPED | OUTPATIENT
Start: 2023-06-23 | End: 2023-11-06

## 2023-06-23 RX ORDER — METOPROLOL TARTRATE 25 MG/1
25 TABLET, FILM COATED ORAL 2 TIMES DAILY WITH MEALS
Qty: 60 TABLET | Refills: 0 | Status: SHIPPED | OUTPATIENT
Start: 2023-06-23 | End: 2023-07-10 | Stop reason: SDUPTHER

## 2023-06-23 RX ORDER — CLOPIDOGREL BISULFATE 75 MG/1
75 TABLET ORAL DAILY
Qty: 30 TABLET | Refills: 0 | Status: SHIPPED | OUTPATIENT
Start: 2023-06-23 | End: 2023-07-10 | Stop reason: SDUPTHER

## 2023-07-10 ENCOUNTER — PATIENT MESSAGE (OUTPATIENT)
Dept: ADMINISTRATIVE | Facility: HOSPITAL | Age: 63
End: 2023-07-10
Payer: MEDICAID

## 2023-07-10 DIAGNOSIS — I10 ESSENTIAL HYPERTENSION: ICD-10-CM

## 2023-07-10 DIAGNOSIS — I63.312 CEREBROVASCULAR ACCIDENT (CVA) DUE TO THROMBOSIS OF LEFT MIDDLE CEREBRAL ARTERY: ICD-10-CM

## 2023-07-11 RX ORDER — CLOPIDOGREL BISULFATE 75 MG/1
75 TABLET ORAL DAILY
Qty: 30 TABLET | Refills: 0 | Status: SHIPPED | OUTPATIENT
Start: 2023-07-11 | End: 2023-10-18 | Stop reason: SDUPTHER

## 2023-07-11 RX ORDER — METOPROLOL TARTRATE 25 MG/1
25 TABLET, FILM COATED ORAL 2 TIMES DAILY WITH MEALS
Qty: 60 TABLET | Refills: 2 | Status: SHIPPED | OUTPATIENT
Start: 2023-07-11 | End: 2023-10-18 | Stop reason: SDUPTHER

## 2023-07-11 RX ORDER — AMLODIPINE BESYLATE 10 MG/1
10 TABLET ORAL DAILY
Qty: 30 TABLET | Refills: 2 | Status: SHIPPED | OUTPATIENT
Start: 2023-07-11 | End: 2023-08-25

## 2023-07-11 RX ORDER — AZILSARTAN KAMEDOXOMIL 80 MG/1
80 TABLET ORAL DAILY
Qty: 30 TABLET | Refills: 2 | Status: SHIPPED | OUTPATIENT
Start: 2023-07-11 | End: 2024-03-13 | Stop reason: SDUPTHER

## 2023-07-18 ENCOUNTER — LAB VISIT (OUTPATIENT)
Dept: LAB | Facility: HOSPITAL | Age: 63
End: 2023-07-18
Attending: STUDENT IN AN ORGANIZED HEALTH CARE EDUCATION/TRAINING PROGRAM
Payer: MEDICAID

## 2023-07-18 DIAGNOSIS — E78.5 DYSLIPIDEMIA ASSOCIATED WITH TYPE 2 DIABETES MELLITUS: ICD-10-CM

## 2023-07-18 DIAGNOSIS — E11.9 TYPE 2 DIABETES MELLITUS WITHOUT COMPLICATION: ICD-10-CM

## 2023-07-18 DIAGNOSIS — E11.69 DYSLIPIDEMIA ASSOCIATED WITH TYPE 2 DIABETES MELLITUS: ICD-10-CM

## 2023-07-18 LAB
ALBUMIN SERPL BCP-MCNC: 3.8 G/DL (ref 3.5–5.2)
ALP SERPL-CCNC: 103 U/L (ref 55–135)
ALT SERPL W/O P-5'-P-CCNC: 26 U/L (ref 10–44)
ANION GAP SERPL CALC-SCNC: 6 MMOL/L (ref 8–16)
AST SERPL-CCNC: 13 U/L (ref 10–40)
BILIRUB SERPL-MCNC: 0.4 MG/DL (ref 0.1–1)
BUN SERPL-MCNC: 15 MG/DL (ref 8–23)
CALCIUM SERPL-MCNC: 9.4 MG/DL (ref 8.7–10.5)
CHLORIDE SERPL-SCNC: 107 MMOL/L (ref 95–110)
CHOLEST SERPL-MCNC: 199 MG/DL (ref 120–199)
CHOLEST/HDLC SERPL: 5 {RATIO} (ref 2–5)
CO2 SERPL-SCNC: 25 MMOL/L (ref 23–29)
CREAT SERPL-MCNC: 1 MG/DL (ref 0.5–1.4)
EST. GFR  (NO RACE VARIABLE): >60 ML/MIN/1.73 M^2
ESTIMATED AVG GLUCOSE: 194 MG/DL (ref 68–131)
GLUCOSE SERPL-MCNC: 237 MG/DL (ref 70–110)
HBA1C MFR BLD: 8.4 % (ref 4–5.6)
HDLC SERPL-MCNC: 40 MG/DL (ref 40–75)
HDLC SERPL: 20.1 % (ref 20–50)
LDLC SERPL CALC-MCNC: 104.4 MG/DL (ref 63–159)
NONHDLC SERPL-MCNC: 159 MG/DL
POTASSIUM SERPL-SCNC: 3.6 MMOL/L (ref 3.5–5.1)
PROT SERPL-MCNC: 8 G/DL (ref 6–8.4)
SODIUM SERPL-SCNC: 138 MMOL/L (ref 136–145)
TRIGL SERPL-MCNC: 273 MG/DL (ref 30–150)

## 2023-07-18 PROCEDURE — 80053 COMPREHEN METABOLIC PANEL: CPT | Performed by: STUDENT IN AN ORGANIZED HEALTH CARE EDUCATION/TRAINING PROGRAM

## 2023-07-18 PROCEDURE — 80061 LIPID PANEL: CPT | Performed by: STUDENT IN AN ORGANIZED HEALTH CARE EDUCATION/TRAINING PROGRAM

## 2023-07-18 PROCEDURE — 83036 HEMOGLOBIN GLYCOSYLATED A1C: CPT | Performed by: STUDENT IN AN ORGANIZED HEALTH CARE EDUCATION/TRAINING PROGRAM

## 2023-07-18 PROCEDURE — 36415 COLL VENOUS BLD VENIPUNCTURE: CPT | Performed by: STUDENT IN AN ORGANIZED HEALTH CARE EDUCATION/TRAINING PROGRAM

## 2023-08-20 DIAGNOSIS — I63.312 CEREBROVASCULAR ACCIDENT (CVA) DUE TO THROMBOSIS OF LEFT MIDDLE CEREBRAL ARTERY: ICD-10-CM

## 2023-08-24 DIAGNOSIS — I10 ESSENTIAL HYPERTENSION: ICD-10-CM

## 2023-08-25 RX ORDER — AMLODIPINE BESYLATE 10 MG/1
10 TABLET ORAL
Qty: 30 TABLET | Refills: 0 | Status: SHIPPED | OUTPATIENT
Start: 2023-08-25 | End: 2023-11-09

## 2023-08-25 RX ORDER — CLOPIDOGREL BISULFATE 75 MG/1
75 TABLET ORAL
Qty: 30 TABLET | Refills: 0 | OUTPATIENT
Start: 2023-08-25

## 2023-08-28 DIAGNOSIS — E11.9 TYPE 2 DIABETES MELLITUS WITHOUT OPHTHALMIC MANIFESTATIONS: ICD-10-CM

## 2023-08-29 ENCOUNTER — TELEPHONE (OUTPATIENT)
Dept: PRIMARY CARE CLINIC | Facility: CLINIC | Age: 63
End: 2023-08-29
Payer: MEDICAID

## 2023-08-29 DIAGNOSIS — I63.312 CEREBROVASCULAR ACCIDENT (CVA) DUE TO THROMBOSIS OF LEFT MIDDLE CEREBRAL ARTERY: Primary | ICD-10-CM

## 2023-08-29 RX ORDER — EMPAGLIFLOZIN 25 MG/1
25 TABLET, FILM COATED ORAL
Qty: 30 TABLET | Refills: 2 | Status: SHIPPED | OUTPATIENT
Start: 2023-08-29 | End: 2023-12-04

## 2023-08-29 NOTE — TELEPHONE ENCOUNTER
Patient calling for amlodipine and plavix.  Dr Barbour filled her amlodipine 8/25/23 and patient is instructed to follow up with her Neurologist for continuation of her plavix.  Patient verbally acknowledges understanding to call her neurologist and schedule an appt.

## 2023-08-30 ENCOUNTER — TELEPHONE (OUTPATIENT)
Dept: PRIMARY CARE CLINIC | Facility: CLINIC | Age: 63
End: 2023-08-30

## 2023-09-12 ENCOUNTER — TELEPHONE (OUTPATIENT)
Dept: PRIMARY CARE CLINIC | Facility: CLINIC | Age: 63
End: 2023-09-12

## 2023-10-02 ENCOUNTER — TELEPHONE (OUTPATIENT)
Dept: PRIMARY CARE CLINIC | Facility: CLINIC | Age: 63
End: 2023-10-02
Payer: MEDICAID

## 2023-10-02 DIAGNOSIS — E11.9 TYPE 2 DIABETES MELLITUS WITHOUT OPHTHALMIC MANIFESTATIONS: Primary | ICD-10-CM

## 2023-10-04 ENCOUNTER — TELEPHONE (OUTPATIENT)
Dept: PRIMARY CARE CLINIC | Facility: CLINIC | Age: 63
End: 2023-10-04
Payer: MEDICAID

## 2023-10-11 DIAGNOSIS — Z12.31 OTHER SCREENING MAMMOGRAM: ICD-10-CM

## 2023-10-15 DIAGNOSIS — E66.9 DIABETES MELLITUS TYPE 2 IN OBESE: ICD-10-CM

## 2023-10-15 DIAGNOSIS — E11.69 DIABETES MELLITUS TYPE 2 IN OBESE: ICD-10-CM

## 2023-10-15 DIAGNOSIS — I10 ESSENTIAL HYPERTENSION: ICD-10-CM

## 2023-10-18 DIAGNOSIS — I63.312 CEREBROVASCULAR ACCIDENT (CVA) DUE TO THROMBOSIS OF LEFT MIDDLE CEREBRAL ARTERY: ICD-10-CM

## 2023-10-18 DIAGNOSIS — I10 ESSENTIAL HYPERTENSION: ICD-10-CM

## 2023-10-19 RX ORDER — METOPROLOL TARTRATE 25 MG/1
25 TABLET, FILM COATED ORAL 2 TIMES DAILY WITH MEALS
Qty: 60 TABLET | Refills: 5 | Status: SHIPPED | OUTPATIENT
Start: 2023-10-19 | End: 2024-01-19 | Stop reason: SDUPTHER

## 2023-10-19 RX ORDER — CLOPIDOGREL BISULFATE 75 MG/1
75 TABLET ORAL DAILY
Qty: 30 TABLET | Refills: 5 | Status: SHIPPED | OUTPATIENT
Start: 2023-10-19 | End: 2024-03-13 | Stop reason: SDUPTHER

## 2023-10-19 RX ORDER — CHOLECALCIFEROL (VITAMIN D3) 1250 MCG
50000 CAPSULE ORAL
Qty: 4 CAPSULE | Refills: 5 | Status: SHIPPED | OUTPATIENT
Start: 2023-10-19 | End: 2024-03-13

## 2023-10-24 ENCOUNTER — HOSPITAL ENCOUNTER (EMERGENCY)
Facility: HOSPITAL | Age: 63
Discharge: HOME | End: 2023-10-24
Attending: EMERGENCY MEDICINE
Payer: MEDICAID

## 2023-10-24 VITALS
OXYGEN SATURATION: 98 % | RESPIRATION RATE: 18 BRPM | BODY MASS INDEX: 39.27 KG/M2 | HEART RATE: 75 BPM | WEIGHT: 230 LBS | SYSTOLIC BLOOD PRESSURE: 126 MMHG | HEIGHT: 64 IN | DIASTOLIC BLOOD PRESSURE: 70 MMHG | TEMPERATURE: 98 F

## 2023-10-24 DIAGNOSIS — L97.529 DIABETIC ULCER OF LEFT FOOT ASSOCIATED WITH TYPE 2 DIABETES MELLITUS, UNSPECIFIED PART OF FOOT, UNSPECIFIED ULCER STAGE: Primary | ICD-10-CM

## 2023-10-24 DIAGNOSIS — E11.621 DIABETIC ULCER OF LEFT FOOT ASSOCIATED WITH TYPE 2 DIABETES MELLITUS, UNSPECIFIED PART OF FOOT, UNSPECIFIED ULCER STAGE: Primary | ICD-10-CM

## 2023-10-24 PROCEDURE — 99283 EMERGENCY DEPT VISIT LOW MDM: CPT

## 2023-10-24 RX ORDER — SILVER SULFADIAZINE 10 G/1000G
CREAM TOPICAL 2 TIMES DAILY
Qty: 50 G | Refills: 0 | Status: SHIPPED | OUTPATIENT
Start: 2023-10-24 | End: 2023-11-03

## 2023-10-24 NOTE — ED PROVIDER NOTES
"Encounter Date: 10/24/2023       History     Chief Complaint   Patient presents with    Foot Problem     Patient reports wound to left foot, chronic/recurring. Hx of diabetes. Waiting for appointment with podiatry .     This is a 63-year-old white female with a history of diabetes mellitus type 2 and hypertension who presents to the emergency department with concerns regarding left foot wound.  Patient reports chronic wound to the left foot that "comes and goes" since sustaining a puncture injury many years ago.  She has an appointment to see a podiatrist next week but came to the ED today for evaluation due to "it looks a little worse".  She reports that the wound appears cracked, however there is no drainage, redness, or pain.  She denies fever, vomiting, or any other relative symptoms at this time.      Review of patient's allergies indicates:   Allergen Reactions    Levemir [insulin detemir] Other (See Comments)     Sob rash to arms and chest  abd cramping diarrhea    Lisinopril Swelling    Metformin      Other reaction(s): severe gi upset    Shellfish containing products      Other^asthma    Tramadol hcl      Other reaction(s): anaphylaxis    Losartan Other (See Comments)     Muscle cramp     Past Medical History:   Diagnosis Date    Asthma     Cirrhosis of liver without mention of alcohol     CVA (cerebral vascular accident) 2021    Diabetes mellitus, type 2     Gout, unspecified     Hyperlipidemia     Hypertension     Obesity, unspecified     Unspecified vitamin D deficiency     Uterine cancer      Past Surgical History:   Procedure Laterality Date     SECTION  , ,  1985     x3    CHOLECYSTECTOMY  2011    COLONOSCOPY N/A 2017    Procedure: COLONOSCOPY;  Surgeon: Luis Bogran-Reyes, MD;  Location: [facility];  Service: Endoscopy;  Laterality: N/A;    HYSTERECTOMY      for uterine cancer    OOPHORECTOMY       Family History   Problem Relation Age of Onset    Alzheimer's " disease Mother     Stroke Mother     Hypertension Mother     Heart attack Father     Hypertension Father     No Known Problems Sister     No Known Problems Daughter     Breast cancer Maternal Aunt     No Known Problems Maternal Uncle     No Known Problems Paternal Aunt     No Known Problems Paternal Uncle     Diabetes Maternal Grandmother     No Known Problems Maternal Grandfather     No Known Problems Paternal Grandmother     No Known Problems Paternal Grandfather     No Known Problems Other     Ovarian cancer Neg Hx     BRCA 1/2 Neg Hx      Social History     Tobacco Use    Smoking status: Former     Current packs/day: 0.00     Types: Cigarettes     Quit date: 1979     Years since quittin.7     Passive exposure: Past    Smokeless tobacco: Never   Substance Use Topics    Alcohol use: No    Drug use: No     Review of Systems   Constitutional:  Negative for appetite change and fever.   Gastrointestinal:  Negative for vomiting.   Skin:  Positive for wound.       Physical Exam     Initial Vitals [10/24/23 1038]   BP Pulse Resp Temp SpO2   (!) 186/82 82 18 98.4 °F (36.9 °C) 98 %      MAP       --         Physical Exam    Nursing note and vitals reviewed.  Constitutional: She appears well-developed and well-nourished. She is active. No distress.   HENT:   Head: Normocephalic and atraumatic.   Eyes: EOM are normal. Pupils are equal, round, and reactive to light.   Neck: Neck supple.   Normal range of motion.  Cardiovascular:  Normal rate.           Pulmonary/Chest: No respiratory distress.   Musculoskeletal:      Cervical back: Normal range of motion and neck supple.        Feet:      Neurological: She is alert and oriented to person, place, and time. GCS score is 15. GCS eye subscore is 4. GCS verbal subscore is 5. GCS motor subscore is 6.   Skin: Skin is warm and dry. Capillary refill takes less than 2 seconds.   Psychiatric: She has a normal mood and affect. Her behavior is normal. Thought content normal.          ED Course   Procedures  Labs Reviewed - No data to display       Imaging Results    None          Medications - No data to display  Medical Decision Making  Risk  Prescription drug management.                               Clinical Impression:   Final diagnoses:  [E11.621, L97.529] Diabetic ulcer of left foot associated with type 2 diabetes mellitus, unspecified part of foot, unspecified ulcer stage (Primary)        ED Disposition Condition    Discharge Stable          ED Prescriptions       Medication Sig Dispense Start Date End Date Auth. Provider    silver sulfADIAZINE 1% (SILVADENE) 1 % cream Apply topically 2 (two) times daily. Apply to affected area twice daily, alternating with wet-to-dry bandaging for 10 days 50 g 10/24/2023 11/3/2023 Chyna Mi NP          Follow-up Information       Follow up With Specialties Details Why Contact Info    Herbert Barbour, DO Family Medicine Schedule an appointment as soon as possible for a visit in 2 days for re-evaluation of today's complaint [address]   [address]  [facility] [address]  [phone]               Chyna Mi NP  10/24/23 0677

## 2023-10-24 NOTE — DISCHARGE INSTRUCTIONS
Wash affected area twice daily with Dial soap and water. Apply fresh dressings 4 times daily, alternating dressing with prescribed antibiotic ointment and wet-to-dry dressings. See your podiatrist as scheduled. Return to the ED for worsening symptoms.

## 2023-11-02 DIAGNOSIS — E11.9 TYPE 2 DIABETES MELLITUS WITHOUT COMPLICATION: ICD-10-CM

## 2023-11-06 RX ORDER — INSULIN ASPART 100 [IU]/ML
INJECTION, SOLUTION INTRAVENOUS; SUBCUTANEOUS
Qty: 9 ML | Refills: 2 | Status: SHIPPED | OUTPATIENT
Start: 2023-11-06 | End: 2024-01-04

## 2023-11-06 RX ORDER — AZILSARTAN KAMEDOXOMIL 80 MG/1
1 TABLET ORAL
Qty: 30 TABLET | Refills: 2 | OUTPATIENT
Start: 2023-11-06

## 2023-11-08 ENCOUNTER — HOSPITAL ENCOUNTER (OUTPATIENT)
Dept: RADIOLOGY | Facility: HOSPITAL | Age: 63
Discharge: HOME | End: 2023-11-08
Attending: HOSPITALIST
Payer: MEDICAID

## 2023-11-08 DIAGNOSIS — I67.9 INTRACRANIAL VASCULAR STENOSIS: ICD-10-CM

## 2023-11-08 PROCEDURE — 70496 CT ANGIOGRAPHY HEAD: CPT | Mod: TC

## 2023-11-08 PROCEDURE — 70498 CT ANGIOGRAPHY NECK: CPT | Mod: TC

## 2023-11-08 PROCEDURE — 25500020 PHARM REV CODE 255: Performed by: HOSPITALIST

## 2023-11-08 RX ADMIN — IOHEXOL 100 ML: 350 INJECTION, SOLUTION INTRAVENOUS at 10:11

## 2023-11-09 ENCOUNTER — OFFICE VISIT (OUTPATIENT)
Dept: ENDOCRINOLOGY | Facility: CLINIC | Age: 63
End: 2023-11-09
Payer: MEDICAID

## 2023-11-09 ENCOUNTER — TELEPHONE (OUTPATIENT)
Dept: PRIMARY CARE CLINIC | Facility: CLINIC | Age: 63
End: 2023-11-09
Payer: MEDICAID

## 2023-11-09 VITALS
WEIGHT: 230 LBS | HEIGHT: 64 IN | HEART RATE: 68 BPM | BODY MASS INDEX: 39.27 KG/M2 | DIASTOLIC BLOOD PRESSURE: 72 MMHG | SYSTOLIC BLOOD PRESSURE: 106 MMHG

## 2023-11-09 DIAGNOSIS — E66.01 CLASS 2 SEVERE OBESITY DUE TO EXCESS CALORIES WITH SERIOUS COMORBIDITY AND BODY MASS INDEX (BMI) OF 39.0 TO 39.9 IN ADULT: ICD-10-CM

## 2023-11-09 DIAGNOSIS — E11.29 TYPE 2 DIABETES MELLITUS WITH MICROALBUMINURIA, WITH LONG-TERM CURRENT USE OF INSULIN: Primary | ICD-10-CM

## 2023-11-09 DIAGNOSIS — R80.9 TYPE 2 DIABETES MELLITUS WITH MICROALBUMINURIA, WITH LONG-TERM CURRENT USE OF INSULIN: Primary | ICD-10-CM

## 2023-11-09 DIAGNOSIS — I10 ESSENTIAL HYPERTENSION: Primary | ICD-10-CM

## 2023-11-09 DIAGNOSIS — Z79.4 TYPE 2 DIABETES MELLITUS WITH MICROALBUMINURIA, WITH LONG-TERM CURRENT USE OF INSULIN: Primary | ICD-10-CM

## 2023-11-09 DIAGNOSIS — E78.2 MIXED HYPERLIPIDEMIA: ICD-10-CM

## 2023-11-09 PROCEDURE — 4010F ACE/ARB THERAPY RXD/TAKEN: CPT | Mod: CPTII,,, | Performed by: STUDENT IN AN ORGANIZED HEALTH CARE EDUCATION/TRAINING PROGRAM

## 2023-11-09 PROCEDURE — 3052F HG A1C>EQUAL 8.0%<EQUAL 9.0%: CPT | Mod: CPTII,,, | Performed by: STUDENT IN AN ORGANIZED HEALTH CARE EDUCATION/TRAINING PROGRAM

## 2023-11-09 PROCEDURE — 99214 PR OFFICE/OUTPT VISIT, EST, LEVL IV, 30-39 MIN: ICD-10-PCS | Mod: S$PBB,,, | Performed by: STUDENT IN AN ORGANIZED HEALTH CARE EDUCATION/TRAINING PROGRAM

## 2023-11-09 PROCEDURE — 99999 PR PBB SHADOW E&M-EST. PATIENT-LVL III: ICD-10-PCS | Mod: PBBFAC,,, | Performed by: STUDENT IN AN ORGANIZED HEALTH CARE EDUCATION/TRAINING PROGRAM

## 2023-11-09 PROCEDURE — 99213 OFFICE O/P EST LOW 20 MIN: CPT | Mod: PBBFAC,PO | Performed by: STUDENT IN AN ORGANIZED HEALTH CARE EDUCATION/TRAINING PROGRAM

## 2023-11-09 PROCEDURE — 3008F BODY MASS INDEX DOCD: CPT | Mod: CPTII,,, | Performed by: STUDENT IN AN ORGANIZED HEALTH CARE EDUCATION/TRAINING PROGRAM

## 2023-11-09 PROCEDURE — 99214 OFFICE O/P EST MOD 30 MIN: CPT | Mod: S$PBB,,, | Performed by: STUDENT IN AN ORGANIZED HEALTH CARE EDUCATION/TRAINING PROGRAM

## 2023-11-09 PROCEDURE — 1159F PR MEDICATION LIST DOCUMENTED IN MEDICAL RECORD: ICD-10-PCS | Mod: CPTII,,, | Performed by: STUDENT IN AN ORGANIZED HEALTH CARE EDUCATION/TRAINING PROGRAM

## 2023-11-09 PROCEDURE — 1160F PR REVIEW ALL MEDS BY PRESCRIBER/CLIN PHARMACIST DOCUMENTED: ICD-10-PCS | Mod: CPTII,,, | Performed by: STUDENT IN AN ORGANIZED HEALTH CARE EDUCATION/TRAINING PROGRAM

## 2023-11-09 PROCEDURE — 3052F PR MOST RECENT HEMOGLOBIN A1C LEVEL 8.0 - < 9.0%: ICD-10-PCS | Mod: CPTII,,, | Performed by: STUDENT IN AN ORGANIZED HEALTH CARE EDUCATION/TRAINING PROGRAM

## 2023-11-09 PROCEDURE — 3078F DIAST BP <80 MM HG: CPT | Mod: CPTII,,, | Performed by: STUDENT IN AN ORGANIZED HEALTH CARE EDUCATION/TRAINING PROGRAM

## 2023-11-09 PROCEDURE — 99999 PR PBB SHADOW E&M-EST. PATIENT-LVL III: CPT | Mod: PBBFAC,,, | Performed by: STUDENT IN AN ORGANIZED HEALTH CARE EDUCATION/TRAINING PROGRAM

## 2023-11-09 PROCEDURE — 4010F PR ACE/ARB THEARPY RXD/TAKEN: ICD-10-PCS | Mod: CPTII,,, | Performed by: STUDENT IN AN ORGANIZED HEALTH CARE EDUCATION/TRAINING PROGRAM

## 2023-11-09 PROCEDURE — 3078F PR MOST RECENT DIASTOLIC BLOOD PRESSURE < 80 MM HG: ICD-10-PCS | Mod: CPTII,,, | Performed by: STUDENT IN AN ORGANIZED HEALTH CARE EDUCATION/TRAINING PROGRAM

## 2023-11-09 PROCEDURE — 3074F PR MOST RECENT SYSTOLIC BLOOD PRESSURE < 130 MM HG: ICD-10-PCS | Mod: CPTII,,, | Performed by: STUDENT IN AN ORGANIZED HEALTH CARE EDUCATION/TRAINING PROGRAM

## 2023-11-09 PROCEDURE — 1160F RVW MEDS BY RX/DR IN RCRD: CPT | Mod: CPTII,,, | Performed by: STUDENT IN AN ORGANIZED HEALTH CARE EDUCATION/TRAINING PROGRAM

## 2023-11-09 PROCEDURE — 3008F PR BODY MASS INDEX (BMI) DOCUMENTED: ICD-10-PCS | Mod: CPTII,,, | Performed by: STUDENT IN AN ORGANIZED HEALTH CARE EDUCATION/TRAINING PROGRAM

## 2023-11-09 PROCEDURE — 1159F MED LIST DOCD IN RCRD: CPT | Mod: CPTII,,, | Performed by: STUDENT IN AN ORGANIZED HEALTH CARE EDUCATION/TRAINING PROGRAM

## 2023-11-09 PROCEDURE — 3074F SYST BP LT 130 MM HG: CPT | Mod: CPTII,,, | Performed by: STUDENT IN AN ORGANIZED HEALTH CARE EDUCATION/TRAINING PROGRAM

## 2023-11-09 RX ORDER — BLOOD-GLUCOSE SENSOR
1 EACH MISCELLANEOUS
Qty: 3 EACH | Refills: 11 | Status: SHIPPED | OUTPATIENT
Start: 2023-11-09 | End: 2024-11-08

## 2023-11-09 RX ORDER — BLOOD-GLUCOSE,RECEIVER,CONT
EACH MISCELLANEOUS
Qty: 1 EACH | Refills: 0 | Status: SHIPPED | OUTPATIENT
Start: 2023-11-09

## 2023-11-09 RX ORDER — AMITRIPTYLINE HYDROCHLORIDE 25 MG/1
25 TABLET, FILM COATED ORAL NIGHTLY
COMMUNITY
Start: 2023-10-12

## 2023-11-09 RX ORDER — AMLODIPINE BESYLATE 10 MG/1
10 TABLET ORAL DAILY
Qty: 90 TABLET | Refills: 1 | Status: SHIPPED | OUTPATIENT
Start: 2023-11-09 | End: 2024-01-19 | Stop reason: SDUPTHER

## 2023-11-09 NOTE — PROGRESS NOTES
"Subjective:      Patient ID: Lisa Hendricks is a 63 y.o. female.    Chief Complaint:  Diabetes      History of Present Illness  This is a 63 y.o. female. with a past medical history of HTN, DM2 on insulin, CVA with R sided weakness here for DM2.    Type 2 diabetes mellitus    Current diabetes medications:  - Jardiance 25 mg daily  - Novolog 20 U ACTID    Past diabetes medications:  - Trulicity - GI intolerance  - Toujeo  - Janumet XR 50/1,000 - GI upset  - Metformin - GI upset  - Lantus    Lab Results   Component Value Date    CREATININE 1.0 07/18/2023    EGFRNORACEVR >60.0 07/18/2023       Known diabetic complications: cerebrovascular disease    Weight trend:  Wt Readings from Last 8 Encounters:   11/09/23 104.3 kg (230 lb)   10/24/23 104.3 kg (230 lb)   04/12/23 103 kg (227 lb)   10/04/22 90.7 kg (200 lb)   07/27/22 90.7 kg (200 lb)   02/02/22 80.3 kg (177 lb)   01/29/22 80.3 kg (177 lb)   11/30/21 80.3 kg (177 lb)       Family history of diabetes:  Yes    Prior visit with diabetes education: Yes    Current diet: 3 meals per day  Current exercise: Limited    Blood glucose monitoring at home: 200s      Diabetes Management Status  Statin: Taking  ACE/ARB: Not taking    Screening or Prevention Patient's value   HgA1C Testing and Control   Lab Results   Component Value Date    HGBA1C 8.4 (H) 07/18/2023        LDL control Lab Results   Component Value Date    LDLCALC 104.4 07/18/2023      Nephropathy screening Lab Results   Component Value Date    MICALBCREAT 64.4 (H) 10/26/2016        Lab Results   Component Value Date    TSH 4.49 11/06/2020       Last eye exam: : 05/26/2023      Review of Systems  As above    Social and family history reviewed  Current medications and allergies reviewed    Objective:   /72 (BP Location: Right arm, Patient Position: Sitting, BP Method: Medium (Manual))   Pulse 68   Ht 5' 4" (1.626 m)   Wt 104.3 kg (230 lb)   BMI 39.48 kg/m²   Physical Exam   Sitting on wheelchair  Alert, " oriented    BP Readings from Last 1 Encounters:   11/09/23 106/72      Wt Readings from Last 1 Encounters:   11/09/23 1005 104.3 kg (230 lb)     Body mass index is 39.48 kg/m².    Lab Review:   Lab Results   Component Value Date    HGBA1C 8.4 (H) 07/18/2023     Lab Results   Component Value Date    CHOL 199 07/18/2023    HDL 40 07/18/2023    LDLCALC 104.4 07/18/2023    TRIG 273 (H) 07/18/2023    CHOLHDL 20.1 07/18/2023     Lab Results   Component Value Date     07/18/2023    K 3.6 07/18/2023     07/18/2023    CO2 25 07/18/2023     (H) 07/18/2023    BUN 15 07/18/2023    CREATININE 1.0 07/18/2023    CALCIUM 9.4 07/18/2023    PROT 8.0 07/18/2023    ALBUMIN 3.8 07/18/2023    BILITOT 0.4 07/18/2023    ALKPHOS 103 07/18/2023    AST 13 07/18/2023    ALT 26 07/18/2023    ANIONGAP 6 (L) 07/18/2023    ESTGFRAFRICA >60.0 02/02/2022    EGFRNONAA >60.0 02/02/2022    TSH 4.49 11/06/2020       All pertinent labs reviewed    Assessment and Plan     Type 2 diabetes mellitus with microalbuminuria, with long-term current use of insulin  Uncontrolled based on A1c and reported POCT glucose with significant glycemic variability.    Patient is on an intensive insulin regimen with 4 daily injections and is testing glucose 4+ times daily. Patient has demonstrated an understanding of technology and is motivated to use the device correctly. Patient is expected to adhere to a diabetes treatment plan and is capable of recognizing alerts and alarms. Patient has recurrent, severe (BG <54) hypoglycemia and impaired awareness of hypoglycemia.    Patient's insulin regimen requires frequent adjustments based on BG results. Patient will receive an in-person visit every 6 months to assess adherence to CGM regimen and diabetes treatment.    Keep same regimen pending CGM review    Class 2 severe obesity with serious comorbidity and body mass index (BMI) of 39.0 to 39.9 in adult  Intolerance to Trulicity  Could consider GLP-1/GIP  RA    Mixed hyperlipidemia  Continue statin        Compa Hunter MD  Endocrinology

## 2023-11-22 DIAGNOSIS — E11.29 TYPE 2 DIABETES MELLITUS WITH MICROALBUMINURIA, WITH LONG-TERM CURRENT USE OF INSULIN: Primary | ICD-10-CM

## 2023-11-22 DIAGNOSIS — R80.9 TYPE 2 DIABETES MELLITUS WITH MICROALBUMINURIA, WITH LONG-TERM CURRENT USE OF INSULIN: Primary | ICD-10-CM

## 2023-11-22 DIAGNOSIS — Z79.4 TYPE 2 DIABETES MELLITUS WITH MICROALBUMINURIA, WITH LONG-TERM CURRENT USE OF INSULIN: Primary | ICD-10-CM

## 2023-11-22 RX ORDER — TIRZEPATIDE 2.5 MG/.5ML
2.5 INJECTION, SOLUTION SUBCUTANEOUS
Qty: 4 PEN | Refills: 0 | Status: SHIPPED | OUTPATIENT
Start: 2023-11-22 | End: 2024-03-13

## 2023-12-04 DIAGNOSIS — E11.9 TYPE 2 DIABETES MELLITUS WITHOUT OPHTHALMIC MANIFESTATIONS: ICD-10-CM

## 2023-12-04 RX ORDER — EMPAGLIFLOZIN 25 MG/1
25 TABLET, FILM COATED ORAL
Qty: 30 TABLET | Refills: 2 | Status: SHIPPED | OUTPATIENT
Start: 2023-12-04 | End: 2024-02-29

## 2024-01-01 DIAGNOSIS — E66.9 DIABETES MELLITUS TYPE 2 IN OBESE: ICD-10-CM

## 2024-01-01 DIAGNOSIS — E11.69 DIABETES MELLITUS TYPE 2 IN OBESE: ICD-10-CM

## 2024-01-04 RX ORDER — INSULIN ASPART 100 [IU]/ML
INJECTION, SOLUTION INTRAVENOUS; SUBCUTANEOUS
Qty: 9 ML | Refills: 2 | Status: SHIPPED | OUTPATIENT
Start: 2024-01-04

## 2024-01-19 DIAGNOSIS — I10 ESSENTIAL HYPERTENSION: Primary | ICD-10-CM

## 2024-01-19 RX ORDER — AMLODIPINE BESYLATE 10 MG/1
10 TABLET ORAL DAILY
Qty: 30 TABLET | Refills: 1 | Status: SHIPPED | OUTPATIENT
Start: 2024-01-19 | End: 2024-03-13 | Stop reason: SDUPTHER

## 2024-01-19 RX ORDER — METOPROLOL TARTRATE 25 MG/1
25 TABLET, FILM COATED ORAL 2 TIMES DAILY WITH MEALS
Qty: 60 TABLET | Refills: 1 | Status: SHIPPED | OUTPATIENT
Start: 2024-01-19 | End: 2024-03-13 | Stop reason: SDUPTHER

## 2024-02-25 DIAGNOSIS — E11.9 TYPE 2 DIABETES MELLITUS WITHOUT OPHTHALMIC MANIFESTATIONS: Primary | ICD-10-CM

## 2024-02-29 RX ORDER — EMPAGLIFLOZIN 25 MG/1
25 TABLET, FILM COATED ORAL
Qty: 30 TABLET | Refills: 0 | Status: SHIPPED | OUTPATIENT
Start: 2024-02-29 | End: 2024-03-13 | Stop reason: SDUPTHER

## 2024-03-13 ENCOUNTER — OFFICE VISIT (OUTPATIENT)
Dept: PRIMARY CARE CLINIC | Facility: CLINIC | Age: 64
End: 2024-03-13
Payer: MEDICAID

## 2024-03-13 VITALS
BODY MASS INDEX: 39.27 KG/M2 | WEIGHT: 230 LBS | DIASTOLIC BLOOD PRESSURE: 88 MMHG | TEMPERATURE: 98 F | RESPIRATION RATE: 17 BRPM | OXYGEN SATURATION: 97 % | HEART RATE: 65 BPM | SYSTOLIC BLOOD PRESSURE: 142 MMHG | HEIGHT: 64 IN

## 2024-03-13 DIAGNOSIS — E79.0 ELEVATED URIC ACID IN BLOOD: ICD-10-CM

## 2024-03-13 DIAGNOSIS — R39.81 FUNCTIONAL URINARY INCONTINENCE: ICD-10-CM

## 2024-03-13 DIAGNOSIS — E11.9 TYPE 2 DIABETES MELLITUS WITHOUT OPHTHALMIC MANIFESTATIONS: ICD-10-CM

## 2024-03-13 DIAGNOSIS — G81.91 RIGHT HEMIPARESIS: ICD-10-CM

## 2024-03-13 DIAGNOSIS — E66.01 CLASS 2 SEVERE OBESITY DUE TO EXCESS CALORIES WITH SERIOUS COMORBIDITY AND BODY MASS INDEX (BMI) OF 39.0 TO 39.9 IN ADULT: ICD-10-CM

## 2024-03-13 DIAGNOSIS — R26.81 GAIT INSTABILITY: Chronic | ICD-10-CM

## 2024-03-13 DIAGNOSIS — E11.69 DIABETES MELLITUS TYPE 2 IN OBESE: ICD-10-CM

## 2024-03-13 DIAGNOSIS — E66.9 DIABETES MELLITUS TYPE 2 IN OBESE: ICD-10-CM

## 2024-03-13 DIAGNOSIS — E78.2 MIXED HYPERLIPIDEMIA: ICD-10-CM

## 2024-03-13 DIAGNOSIS — I10 ESSENTIAL HYPERTENSION: Primary | ICD-10-CM

## 2024-03-13 DIAGNOSIS — I63.312 CEREBROVASCULAR ACCIDENT (CVA) DUE TO THROMBOSIS OF LEFT MIDDLE CEREBRAL ARTERY: ICD-10-CM

## 2024-03-13 DIAGNOSIS — G47.9 SLEEPING DIFFICULTIES: ICD-10-CM

## 2024-03-13 PROBLEM — N30.00 ACUTE CYSTITIS WITHOUT HEMATURIA: Status: RESOLVED | Noted: 2020-10-29 | Resolved: 2024-03-13

## 2024-03-13 PROCEDURE — 1159F MED LIST DOCD IN RCRD: CPT | Mod: CPTII,,, | Performed by: STUDENT IN AN ORGANIZED HEALTH CARE EDUCATION/TRAINING PROGRAM

## 2024-03-13 PROCEDURE — 3077F SYST BP >= 140 MM HG: CPT | Mod: CPTII,,, | Performed by: STUDENT IN AN ORGANIZED HEALTH CARE EDUCATION/TRAINING PROGRAM

## 2024-03-13 PROCEDURE — 3072F LOW RISK FOR RETINOPATHY: CPT | Mod: CPTII,,, | Performed by: STUDENT IN AN ORGANIZED HEALTH CARE EDUCATION/TRAINING PROGRAM

## 2024-03-13 PROCEDURE — 3008F BODY MASS INDEX DOCD: CPT | Mod: CPTII,,, | Performed by: STUDENT IN AN ORGANIZED HEALTH CARE EDUCATION/TRAINING PROGRAM

## 2024-03-13 PROCEDURE — 99214 OFFICE O/P EST MOD 30 MIN: CPT | Mod: S$PBB,,, | Performed by: STUDENT IN AN ORGANIZED HEALTH CARE EDUCATION/TRAINING PROGRAM

## 2024-03-13 PROCEDURE — 99999 PR PBB SHADOW E&M-EST. PATIENT-LVL IV: CPT | Mod: PBBFAC,,, | Performed by: STUDENT IN AN ORGANIZED HEALTH CARE EDUCATION/TRAINING PROGRAM

## 2024-03-13 PROCEDURE — 99214 OFFICE O/P EST MOD 30 MIN: CPT | Mod: PBBFAC | Performed by: STUDENT IN AN ORGANIZED HEALTH CARE EDUCATION/TRAINING PROGRAM

## 2024-03-13 PROCEDURE — 3079F DIAST BP 80-89 MM HG: CPT | Mod: CPTII,,, | Performed by: STUDENT IN AN ORGANIZED HEALTH CARE EDUCATION/TRAINING PROGRAM

## 2024-03-13 PROCEDURE — 4010F ACE/ARB THERAPY RXD/TAKEN: CPT | Mod: CPTII,,, | Performed by: STUDENT IN AN ORGANIZED HEALTH CARE EDUCATION/TRAINING PROGRAM

## 2024-03-13 RX ORDER — CLOPIDOGREL BISULFATE 75 MG/1
75 TABLET ORAL DAILY
Qty: 90 TABLET | Refills: 3 | Status: SHIPPED | OUTPATIENT
Start: 2024-03-13

## 2024-03-13 RX ORDER — AZILSARTAN KAMEDOXOMIL 80 MG/1
80 TABLET ORAL DAILY
Qty: 90 TABLET | Refills: 3 | Status: SHIPPED | OUTPATIENT
Start: 2024-03-13

## 2024-03-13 RX ORDER — AMLODIPINE BESYLATE 10 MG/1
10 TABLET ORAL DAILY
Qty: 90 TABLET | Refills: 3 | Status: SHIPPED | OUTPATIENT
Start: 2024-03-13 | End: 2024-05-09

## 2024-03-13 RX ORDER — EMPAGLIFLOZIN 25 MG/1
25 TABLET, FILM COATED ORAL DAILY
Qty: 30 TABLET | Refills: 11 | Status: SHIPPED | OUTPATIENT
Start: 2024-03-13

## 2024-03-13 RX ORDER — INSULIN ASPART 100 [IU]/ML
10 INJECTION, SOLUTION INTRAVENOUS; SUBCUTANEOUS
Qty: 9 ML | Refills: 2 | Status: SHIPPED | OUTPATIENT
Start: 2024-03-13 | End: 2024-05-31 | Stop reason: SDUPTHER

## 2024-03-13 RX ORDER — ASPIRIN 81 MG/1
81 TABLET ORAL DAILY
COMMUNITY

## 2024-03-13 RX ORDER — METOPROLOL TARTRATE 25 MG/1
25 TABLET, FILM COATED ORAL 2 TIMES DAILY WITH MEALS
Qty: 60 TABLET | Refills: 11 | Status: SHIPPED | OUTPATIENT
Start: 2024-03-13

## 2024-03-13 RX ORDER — GABAPENTIN 300 MG/1
300 CAPSULE ORAL NIGHTLY
Qty: 30 CAPSULE | Refills: 11 | Status: SHIPPED | OUTPATIENT
Start: 2024-03-13 | End: 2024-04-30

## 2024-03-13 NOTE — ASSESSMENT & PLAN NOTE
Polyneuropathy affecting feet bilaterally. Monitoring glucose with DEXCOM G7. Patient has been instructed to follow up with her primary endocrinologist.   She has been off Mounjaro for the past two months. While on Mounjaro has experienced early satiety, fullness, nausea and vomiting after having soup in the past.   Follow heart healthy, restrict highly refined carbohydrates from diet.  - f/u endocrinology  - f/u ophthalmology  - f/u podiatry

## 2024-03-13 NOTE — ASSESSMENT & PLAN NOTE
Lab Results   Component Value Date    URICACID 6.4 (H) 10/26/2016   Denies joint swelling or acute gouty flares.   Continue with diabetes control. Monitor symptoms.

## 2024-03-13 NOTE — ASSESSMENT & PLAN NOTE
Secondary to CVA due to stenosis of left anterior cerebral artery, residual right hemiparesis.    - continue DAPT per neruology  - order wheelchair to assist with mobility   - gait affected with concern for falls with use of walker  - f/u referral PT and OT evaluation via home health

## 2024-03-13 NOTE — ASSESSMENT & PLAN NOTE
Follow up notes from primary cardiology. Patient currently does not take statin for secondary prevention. Encouraged following a high fiber diet.   - follow heart healthy diet, whole vegetables, whole fruits, whole meats  - avoid processed foods, cut back on highly refined carbohydrates including breads, pasta, tortillas and starchy foods like white potato  - f/u lipid panel

## 2024-03-13 NOTE — ASSESSMENT & PLAN NOTE
ADLs reliant on aid and assistants at home. Patient complains of leakage requiring, underpads, diapers, gloves and wipes.   - order sent to [facility] medical supplies as instructed by patient's insurance and

## 2024-03-13 NOTE — ASSESSMENT & PLAN NOTE
Associated with neuropathy, especially in bilateral feet. Denies restlessness. Gabapentin helping with pain and sleep.

## 2024-03-13 NOTE — PROGRESS NOTES
[facility] Note    HPI:  Lisa Hendricks is a 64 y.o. female who presents today for Consult (Pt here to consult about home health, new wheelchair and medical supplies. ) and Referral (Pt is here to request referrals for PT and OT. )  64 year old with hypertension, dyslipidemia, insulin dependent diabetes mellitus type 2, vitamin D deficiency, chronic low back pain, chronic pain syndrome, gout, history of CVA with right hemiparesis.   Minimally ambulatory with walker at home.   Patient is requiring wheel chair for access to and fro medical appointments.   Denies fever, chills, excessive headaches, vision changes, chest pain, palpitations, shortness of breath, abdominal pain, nausea, vomiting, constipation, diarrhea.      Care Coordination:  Cardiology, [facility] Dr. Rodrigez   Neurology, per patient recommendation to continue with DAPT  Endocrinology, Dr. Hunter, will need to make follow up appointment. Currently endorses taking novolog 30U QAC TID and no long acting basal insulin. Per patient dexcom readings, fasting glucose   Podiatry, Dr. Rick NAJERA   A review of systems was performed and was negative except as noted above.    I personally reviewed allergies, past medical, surgical, social and family history and updated as appropriate.    Medications:    Current Outpatient Medications:     albuterol (PROVENTIL/VENTOLIN HFA) 90 mcg/actuation inhaler, Inhale 2 puffs into the lungs every 6 (six) hours as needed for Wheezing. Rescue, Disp: , Rfl:     amitriptyline (ELAVIL) 25 MG tablet, Take 25 mg by mouth every evening., Disp: , Rfl:     aspirin (ECOTRIN) 81 MG EC tablet, Take 81 mg by mouth once daily., Disp: , Rfl:     blood sugar diagnostic Strp, 1 strip by Misc.(Non-Drug; Combo Route) route 3 (three) times daily., Disp: , Rfl:     blood-glucose meter,continuous (DEXCOM G7 ) Misc, Use to check glucose with sensors, Disp: 1 each, Rfl: 0    blood-glucose sensor (DEXCOM G7 SENSOR) Meli, 1  "Device by Misc.(Non-Drug; Combo Route) route every 10 days., Disp: 3 each, Rfl: 11    insulin needles, disposable, 32 x 1/4 " Ndle, 1 Device by Misc.(Non-Drug; Combo Route) route 4 (four) times daily., Disp: , Rfl:     LANCETS MISC, 1 Act by Misc.(Non-Drug; Combo Route) route 3 (three) times daily., Disp: , Rfl:     loratadine (CLARITIN) 10 mg tablet, 1 tablet., Disp: , Rfl:     polyethylene glycol (GLYCOLAX) 17 gram PwPk, Take by mouth., Disp: , Rfl:     amLODIPine (NORVASC) 10 MG tablet, Take 1 tablet (10 mg total) by mouth once daily., Disp: 90 tablet, Rfl: 3    azilsartan medoxomiL (EDARBI) 80 mg Tab, Take 80 mg by mouth once daily., Disp: 90 tablet, Rfl: 3    clopidogreL (PLAVIX) 75 mg tablet, Take 1 tablet (75 mg total) by mouth once daily., Disp: 90 tablet, Rfl: 3    gabapentin (NEURONTIN) 300 MG capsule, Take 1 capsule (300 mg total) by mouth every evening., Disp: 30 capsule, Rfl: 11    incontinence pad, liner, disp Pads, 2 each by Misc.(Non-Drug; Combo Route) route 2 (two) times a day., Disp: 30 each, Rfl: 11    insulin aspart U-100 (NOVOLOG) 100 unit/mL (3 mL) InPn pen, Inject 10 Units into the skin 3 (three) times daily with meals., Disp: 9 mL, Rfl: 2    JARDIANCE 25 mg tablet, Take 1 tablet (25 mg total) by mouth once daily., Disp: 30 tablet, Rfl: 11    metoprolol tartrate (LOPRESSOR) 25 MG tablet, Take 1 tablet (25 mg total) by mouth 2 (two) times daily with meals., Disp: 60 tablet, Rfl: 11     Health Maintenance:  Immunization History   Administered Date(s) Administered    DTP 1960, 1962, 1962, 07/10/1968    OPV 1962, 1962, 02/07/1963, 08/08/1968    PPD Test 11/07/2020    Td (ADULT) 02/28/1979, 08/28/2007      Health Maintenance   Topic Date Due    Shingles Vaccine (1 of 2) Never done    TETANUS VACCINE  08/28/2017    Foot Exam  11/22/2022    Mammogram  10/04/2023    Hemoglobin A1c  10/18/2023    Eye Exam  05/26/2024    Lipid Panel  07/18/2024    Colorectal Cancer " "Screening  07/13/2027    Hepatitis C Screening  Completed     Health Maintenance Topics with due status: Not Due       Topic Last Completion Date    Colorectal Cancer Screening 07/13/2017    Lipid Panel 07/18/2023     Health Maintenance Due   Topic Date Due    Pneumococcal Vaccines (Age 0-64) (1 of 2 - PCV) Never done    HIV Screening  Never done    Shingles Vaccine (1 of 2) Never done    TETANUS VACCINE  08/28/2017    Diabetes Urine Screening  10/26/2017    RSV Vaccine (Age 60+ and Pregnant patients) (1 - 1-dose 60+ series) Never done    Foot Exam  11/22/2022    Influenza Vaccine (1) Never done    Mammogram  10/04/2023    Hemoglobin A1c  10/18/2023    Eye Exam  05/26/2024       PHYSICAL EXAM:  Vitals:    03/13/24 1030   BP: (!) 142/88   BP Location: Left arm   Patient Position: Sitting   BP Method: Large (Manual)   Pulse: 65   Resp: 17   Temp: 98.4 °F (36.9 °C)   SpO2: 97%   Weight: 104.3 kg (230 lb)   Height: 5' 4" (1.626 m)     Body mass index is 39.48 kg/m².  Physical Exam  Vitals reviewed.   Constitutional:       General: She is not in acute distress.     Appearance: Normal appearance. She is normal weight. She is not ill-appearing or toxic-appearing.   HENT:      Head: Normocephalic and atraumatic.      Right Ear: External ear normal.      Left Ear: External ear normal.      Nose: Nose normal.      Mouth/Throat:      Mouth: Mucous membranes are moist.      Pharynx: Oropharynx is clear.   Eyes:      Extraocular Movements: Extraocular movements intact.      Conjunctiva/sclera: Conjunctivae normal.   Cardiovascular:      Rate and Rhythm: Normal rate and regular rhythm.      Pulses: Normal pulses.      Heart sounds: Normal heart sounds.   Pulmonary:      Effort: Pulmonary effort is normal. No respiratory distress.      Breath sounds: No stridor. No wheezing, rhonchi or rales.   Abdominal:      General: Abdomen is flat. Bowel sounds are normal.      Palpations: Abdomen is soft.   Musculoskeletal:         General: " No tenderness. Normal range of motion.      Cervical back: Normal range of motion and neck supple. No rigidity or tenderness.   Skin:     General: Skin is warm and dry.      Capillary Refill: Capillary refill takes less than 2 seconds.   Neurological:      General: No focal deficit present.      Mental Status: She is alert and oriented to person, place, and time. Mental status is at baseline.      Motor: No weakness.      Gait: Gait normal.   Psychiatric:         Mood and Affect: Mood normal.         Behavior: Behavior normal.         Thought Content: Thought content normal.         Judgment: Judgment normal.          ASSESSMENT/PLAN:  1. Essential hypertension  Assessment & Plan:  BP Readings from Last 3 Encounters:   03/13/24 (!) 142/88   11/09/23 106/72   10/24/23 126/70   Per patient no changes to BP medications per cardiology.   - metoprolol 25 mg BID  - amlodipine 10 mg daily  - edarbi 80 mg dailiy  - follow low-sodium diet <2 g or 2 teaspoons daily  - avoid processed and preserved foods, ie microwave meals, pickles, canned fruits  - incorporate whole vegetables and fruits and meats  - increase daily physical activity as tolerated  - f/u BMP      Orders:  -     amLODIPine (NORVASC) 10 MG tablet; Take 1 tablet (10 mg total) by mouth once daily.  Dispense: 90 tablet; Refill: 3  -     azilsartan medoxomiL (EDARBI) 80 mg Tab; Take 80 mg by mouth once daily.  Dispense: 90 tablet; Refill: 3  -     metoprolol tartrate (LOPRESSOR) 25 MG tablet; Take 1 tablet (25 mg total) by mouth 2 (two) times daily with meals.  Dispense: 60 tablet; Refill: 11  -     Ambulatory referral/consult to Home Health; Future; Expected date: 03/14/2024  -     Comprehensive Metabolic Panel; Future; Expected date: 03/13/2024    2. Mixed hyperlipidemia  Assessment & Plan:  Follow up notes from primary cardiology. Patient currently does not take statin for secondary prevention. Encouraged following a high fiber diet.   - follow heart healthy  diet, whole vegetables, whole fruits, whole meats  - avoid processed foods, cut back on highly refined carbohydrates including breads, pasta, tortillas and starchy foods like white potato  - f/u lipid panel    Orders:  -     Lipid Panel; Future; Expected date: 03/13/2024    3. Cerebrovascular accident (CVA) due to thrombosis of left middle cerebral artery  -     clopidogreL (PLAVIX) 75 mg tablet; Take 1 tablet (75 mg total) by mouth once daily.  Dispense: 90 tablet; Refill: 3  -     Ambulatory referral/consult to Home Health; Future; Expected date: 03/14/2024  -     WHEELCHAIR FOR HOME USE    4. Sleeping difficulties  Assessment & Plan:  Associated with neuropathy, especially in bilateral feet. Denies restlessness. Gabapentin helping with pain and sleep.     Orders:  -     gabapentin (NEURONTIN) 300 MG capsule; Take 1 capsule (300 mg total) by mouth every evening.  Dispense: 30 capsule; Refill: 11    5. Diabetes mellitus type 2 in obese  -     insulin aspart U-100 (NOVOLOG) 100 unit/mL (3 mL) InPn pen; Inject 10 Units into the skin 3 (three) times daily with meals.  Dispense: 9 mL; Refill: 2  -     Hemoglobin A1C; Future; Expected date: 03/13/2024  -     Comprehensive Metabolic Panel; Future; Expected date: 03/13/2024  -     MICROALBUMIN / CREATININE RATIO URINE    6. Type 2 diabetes mellitus without ophthalmic manifestations  Assessment & Plan:  Polyneuropathy affecting feet bilaterally. Monitoring glucose with DEXCOM G7. Patient has been instructed to follow up with her primary endocrinologist.   She has been off Mounjaro for the past two months. While on Mounjaro has experienced early satiety, fullness, nausea and vomiting after having soup in the past.   Follow heart healthy, restrict highly refined carbohydrates from diet.  - f/u endocrinology  - f/u ophthalmology  - f/u podiatry      Orders:  -     JARDIANCE 25 mg tablet; Take 1 tablet (25 mg total) by mouth once daily.  Dispense: 30 tablet; Refill: 11  -      Ambulatory referral/consult to Home Health; Future; Expected date: 03/14/2024    7. Functional urinary incontinence  Assessment & Plan:  ADLs reliant on aid and assistants at home. Patient complains of leakage requiring, underpads, diapers, gloves and wipes.   - order sent to [facility] as instructed by patient's insurance and     Orders:  -     HME - OTHER  -     WHEELCHAIR FOR HOME USE  -     Discontinue: incontinence pad, liner, disp Pads; 2 each by Misc.(Non-Drug; Combo Route) route 2 (two) times a day.  Dispense: 30 each; Refill: 11  -     Discontinue: incontinence pad, liner, disp Pads; 2 each by Misc.(Non-Drug; Combo Route) route 2 (two) times a day.  Dispense: 30 each; Refill: 11  -     incontinence pad, liner, disp Pads; 2 each by Misc.(Non-Drug; Combo Route) route 2 (two) times a day.  Dispense: 30 each; Refill: 11    8. Right hemiparesis  -     WHEELCHAIR FOR HOME USE    9. Class 2 severe obesity due to excess calories with serious comorbidity and body mass index (BMI) of 39.0 to 39.9 in adult  Overview:  Wt Readings from Last 8 Encounters:   03/13/24 104.3 kg (230 lb)   11/09/23 104.3 kg (230 lb)   10/24/23 104.3 kg (230 lb)   04/12/23 103 kg (227 lb)   10/04/22 90.7 kg (200 lb)   07/27/22 90.7 kg (200 lb)   02/02/22 80.3 kg (177 lb)   01/29/22 80.3 kg (177 lb)       Assessment & Plan:    Recommendations:   Stay physically active. As tolerated alternate resistance training with stretching and cardio. Goal of 150 minutes per week of moderate intensity activity or 7,500 - 10,000 steps per day. Follow the Mediterranean Diet. Include whole fresh fruits, vegetables, olive oil, seeds, nuts, whole grains, cold water fish, salmon, mackerel and lean cuts of meat.  Do not drink sugary/diet carbonated beverages. Decrease portion sizes slightly which will result in an approximately 500-calorie deficit. Avoid fast or fried and processed food, especially canned foods. Avoid refined  carbohydrates, white starchy foods, flour, white potato, bread, muffins, and cakes. Consider substituting one meal a day with a meal replacement such as Slim fast, lean cuisine, or weight watcher's. Follow a healthy diet that includes enough calcium, vitamin D and proteins for bone health.        10. Elevated uric acid in blood  Assessment & Plan:  Lab Results   Component Value Date    URICACID 6.4 (H) 10/26/2016   Denies joint swelling or acute gouty flares.   Continue with diabetes control. Monitor symptoms.       11. Gait instability  Assessment & Plan:  Secondary to CVA due to stenosis of left anterior cerebral artery, residual right hemiparesis.    - continue DAPT per neruology  - order wheelchair to assist with mobility   - gait affected with concern for falls with use of walker  - f/u referral PT and OT evaluation via home health             Other than changes above, continue current medications and maintain follow up with specialists.      No follow-ups on file.   No results found for this or any previous visit (from the past 2016 hour(s)).      Kazumi G Yoshinaga, DO [facility]

## 2024-03-20 ENCOUNTER — LAB VISIT (OUTPATIENT)
Dept: LAB | Facility: HOSPITAL | Age: 64
End: 2024-03-20
Attending: STUDENT IN AN ORGANIZED HEALTH CARE EDUCATION/TRAINING PROGRAM
Payer: MEDICAID

## 2024-03-20 DIAGNOSIS — E11.69 DIABETES MELLITUS TYPE 2 IN OBESE: ICD-10-CM

## 2024-03-20 DIAGNOSIS — I10 ESSENTIAL HYPERTENSION: ICD-10-CM

## 2024-03-20 DIAGNOSIS — E78.2 MIXED HYPERLIPIDEMIA: ICD-10-CM

## 2024-03-20 DIAGNOSIS — E66.9 DIABETES MELLITUS TYPE 2 IN OBESE: ICD-10-CM

## 2024-03-20 LAB
ALBUMIN SERPL BCP-MCNC: 4 G/DL (ref 3.5–5.2)
ALP SERPL-CCNC: 112 U/L (ref 55–135)
ALT SERPL W/O P-5'-P-CCNC: 24 U/L (ref 10–44)
ANION GAP SERPL CALC-SCNC: 5 MMOL/L (ref 3–11)
AST SERPL-CCNC: 13 U/L (ref 10–40)
BILIRUB SERPL-MCNC: 0.5 MG/DL (ref 0.1–1)
BUN SERPL-MCNC: 11 MG/DL (ref 8–23)
CALCIUM SERPL-MCNC: 9 MG/DL (ref 8.7–10.5)
CHLORIDE SERPL-SCNC: 107 MMOL/L (ref 95–110)
CHOLEST SERPL-MCNC: 203 MG/DL (ref 120–199)
CHOLEST/HDLC SERPL: 4.8 {RATIO} (ref 2–5)
CO2 SERPL-SCNC: 27 MMOL/L (ref 23–29)
CREAT SERPL-MCNC: 1 MG/DL (ref 0.5–1.4)
EST. GFR  (NO RACE VARIABLE): >60 ML/MIN/1.73 M^2
ESTIMATED AVG GLUCOSE: 171 MG/DL (ref 68–131)
GLUCOSE SERPL-MCNC: 193 MG/DL (ref 70–110)
HBA1C MFR BLD: 7.6 % (ref 4–5.6)
HDLC SERPL-MCNC: 42 MG/DL (ref 40–75)
HDLC SERPL: 20.7 % (ref 20–50)
LDLC SERPL CALC-MCNC: 110 MG/DL (ref 63–159)
NONHDLC SERPL-MCNC: 161 MG/DL
POTASSIUM SERPL-SCNC: 4 MMOL/L (ref 3.5–5.1)
PROT SERPL-MCNC: 8.2 G/DL (ref 6–8.4)
SODIUM SERPL-SCNC: 139 MMOL/L (ref 136–145)
TRIGL SERPL-MCNC: 255 MG/DL (ref 30–150)

## 2024-03-20 PROCEDURE — 83036 HEMOGLOBIN GLYCOSYLATED A1C: CPT | Performed by: STUDENT IN AN ORGANIZED HEALTH CARE EDUCATION/TRAINING PROGRAM

## 2024-03-20 PROCEDURE — 80061 LIPID PANEL: CPT | Performed by: STUDENT IN AN ORGANIZED HEALTH CARE EDUCATION/TRAINING PROGRAM

## 2024-03-20 PROCEDURE — 36415 COLL VENOUS BLD VENIPUNCTURE: CPT | Performed by: STUDENT IN AN ORGANIZED HEALTH CARE EDUCATION/TRAINING PROGRAM

## 2024-03-20 PROCEDURE — 80053 COMPREHEN METABOLIC PANEL: CPT | Performed by: STUDENT IN AN ORGANIZED HEALTH CARE EDUCATION/TRAINING PROGRAM

## 2024-04-23 ENCOUNTER — PATIENT MESSAGE (OUTPATIENT)
Dept: ADMINISTRATIVE | Facility: HOSPITAL | Age: 64
End: 2024-04-23
Payer: MEDICAID

## 2024-04-23 ENCOUNTER — PATIENT OUTREACH (OUTPATIENT)
Dept: ADMINISTRATIVE | Facility: HOSPITAL | Age: 64
End: 2024-04-23
Payer: MEDICAID

## 2024-04-30 DIAGNOSIS — G47.9 SLEEPING DIFFICULTIES: ICD-10-CM

## 2024-04-30 RX ORDER — GABAPENTIN 300 MG/1
300 CAPSULE ORAL NIGHTLY
Qty: 30 CAPSULE | Refills: 11 | Status: SHIPPED | OUTPATIENT
Start: 2024-04-30

## 2024-05-08 DIAGNOSIS — I10 ESSENTIAL HYPERTENSION: ICD-10-CM

## 2024-05-09 RX ORDER — AMLODIPINE BESYLATE 10 MG/1
10 TABLET ORAL
Qty: 90 TABLET | Refills: 3 | Status: SHIPPED | OUTPATIENT
Start: 2024-05-09

## 2024-05-14 DIAGNOSIS — G81.91 RIGHT HEMIPARESIS: Primary | ICD-10-CM

## 2024-05-31 DIAGNOSIS — E66.9 DIABETES MELLITUS TYPE 2 IN OBESE: ICD-10-CM

## 2024-05-31 DIAGNOSIS — E11.69 DIABETES MELLITUS TYPE 2 IN OBESE: ICD-10-CM

## 2024-05-31 NOTE — TELEPHONE ENCOUNTER
Patient states you sent her novolog for 10 units.  She states she told you the she takes 30 units. She states her sugars have been over 200.   [facility].

## 2024-06-05 RX ORDER — INSULIN ASPART 100 [IU]/ML
10 INJECTION, SOLUTION INTRAVENOUS; SUBCUTANEOUS
Qty: 9 ML | Refills: 2 | Status: SHIPPED | OUTPATIENT
Start: 2024-06-05 | End: 2024-09-03

## 2024-06-13 ENCOUNTER — HOSPITAL ENCOUNTER (OUTPATIENT)
Dept: RADIOLOGY | Facility: HOSPITAL | Age: 64
Discharge: HOME | End: 2024-06-13
Attending: STUDENT IN AN ORGANIZED HEALTH CARE EDUCATION/TRAINING PROGRAM
Payer: MEDICAID

## 2024-06-13 VITALS — WEIGHT: 230 LBS | BODY MASS INDEX: 39.27 KG/M2 | HEIGHT: 64 IN

## 2024-06-13 DIAGNOSIS — Z12.31 OTHER SCREENING MAMMOGRAM: ICD-10-CM

## 2024-06-13 PROCEDURE — 77067 SCR MAMMO BI INCL CAD: CPT | Mod: TC

## 2024-06-18 ENCOUNTER — PATIENT OUTREACH (OUTPATIENT)
Dept: ADMINISTRATIVE | Facility: HOSPITAL | Age: 64
End: 2024-06-18
Payer: MEDICAID

## 2024-06-18 ENCOUNTER — CLINICAL SUPPORT (OUTPATIENT)
Dept: REHABILITATION | Facility: HOSPITAL | Age: 64
End: 2024-06-18
Attending: STUDENT IN AN ORGANIZED HEALTH CARE EDUCATION/TRAINING PROGRAM
Payer: MEDICAID

## 2024-06-18 DIAGNOSIS — G81.91 RIGHT HEMIPARESIS: Primary | ICD-10-CM

## 2024-06-18 DIAGNOSIS — R26.81 GAIT INSTABILITY: Chronic | ICD-10-CM

## 2024-06-18 DIAGNOSIS — G81.91 RIGHT HEMIPARESIS: ICD-10-CM

## 2024-06-18 PROCEDURE — 97110 THERAPEUTIC EXERCISES: CPT

## 2024-06-18 PROCEDURE — 97166 OT EVAL MOD COMPLEX 45 MIN: CPT

## 2024-06-18 PROCEDURE — 97530 THERAPEUTIC ACTIVITIES: CPT

## 2024-06-18 PROCEDURE — 97161 PT EVAL LOW COMPLEX 20 MIN: CPT

## 2024-06-18 NOTE — PROGRESS NOTES
Please see treatment section for details and POC.          HOME EXERCISE PROGRAM  Created by Pricila Dwyer  Rolo 18th, 2024  View videos at [email or web address]        SEATED CLAM SHELLS - HIP ABDUCTION ADDUCTION    Start by sitting close to the edge of a chair with knees bent and both feet on the floor. Next, move your knees out to the side as shown and then return to straight ahead. Maintain contact of your feet on the floor the entire time.    Video # [id number] Repeat 10 Times   Hold 3 Seconds   Complete 3 Sets   Perform 2 Times a Day          KNEE EXTENSION - LONG ARC QUAD (LAQ)    While seated with your knee in a bent position and your heel touching the ground, slowly straighten your knee as you raise your foot upwards as shown. Lower your foot back down until your heel touches the ground and then repeat.    Video # [id number] Repeat 10 Times   Hold 1 Second   Complete 3 Sets   Perform 2 Times a Day          HEEL RAISE - CALF RAISE - BILATERAL    While seated in a chair, place your feet on the floor.    Next, press down with your forefoot and toes to raise your heels up off the floor. Lower your heels back down and repeat.    Keep your toes on the ground the entire time.    Video # [id number] Repeat 10 Times   Hold 1 Second   Complete 3 Sets   Perform 2 Times a Day          SEATED MARCHING    While sitting in a chair, lift your foot off the ground as you flex your hip and lift your leg. Lower back down and repeat on the opposite leg. Repeat this alternating movement.    Video # [id number] Repeat 10 Times   Hold 1 Second   Complete 3 Sets   Perform 2 Times a Day          TOES RAISES - DORSIFLEXION - BILATERAL    Sit in a chair with your feet flat on the ground.    Next, raise up your toes and forefoot on both sides as you bend at your ankles. Keep your heels on the ground the entire time. If this is difficult, slide your feet forward and try again.    Video #

## 2024-06-18 NOTE — PLAN OF CARE
Name: Lisa Hendricks  MRN: [id number]   Physician: Herbert Barbour, DO     Diagnosis:   Encounter Diagnosis   Name Primary?    Right hemiparesis Yes        Onset date: Nov 2020    Date of service: 6/18/2024  Start time: 1100  End time: 1145    Orders: Eval and Treat    Subjective:  Patient goals: Increase independence in activities of daily living   Chief Complaint:   Chief Complaint   Patient presents with    OT Initial Evaluation      Past Medical History:   Diagnosis Date    Acute cystitis without hematuria 10/29/2020    Asthma     Cirrhosis of liver without mention of alcohol     CVA (cerebral vascular accident) 11/08/2021    Diabetes mellitus, type 2     Gout, unspecified     Hyperlipidemia     Hypertension     Obesity, unspecified     Unspecified vitamin D deficiency     Uterine cancer 2019      Current Outpatient Medications   Medication Sig    albuterol (PROVENTIL/VENTOLIN HFA) 90 mcg/actuation inhaler Inhale 2 puffs into the lungs every 6 (six) hours as needed for Wheezing. Rescue    amitriptyline (ELAVIL) 25 MG tablet Take 25 mg by mouth every evening.    amLODIPine (NORVASC) 10 MG tablet TAKE 1 TABLET(10 MG) BY MOUTH EVERY DAY    aspirin (ECOTRIN) 81 MG EC tablet Take 81 mg by mouth once daily.    azilsartan medoxomiL (EDARBI) 80 mg Tab Take 80 mg by mouth once daily.    blood sugar diagnostic Strp 1 strip by Misc.(Non-Drug; Combo Route) route 3 (three) times daily.    blood-glucose meter,continuous (DEXCOM G7 ) Misc Use to check glucose with sensors    blood-glucose sensor (DEXCOM G7 SENSOR) Meli 1 Device by Misc.(Non-Drug; Combo Route) route every 10 days.    clopidogreL (PLAVIX) 75 mg tablet Take 1 tablet (75 mg total) by mouth once daily.    gabapentin (NEURONTIN) 300 MG capsule TAKE 1 CAPSULE(300 MG) BY MOUTH EVERY EVENING    incontinence pad, liner, disp Pads 2 each by Misc.(Non-Drug; Combo Route) route 2 (two) times a day.    insulin aspart U-100 (NOVOLOG) 100 unit/mL (3 mL) InPn pen Inject  "10 Units into the skin 3 (three) times daily with meals.    insulin needles, disposable, 32 x 1/4 " Ndle 1 Device by Misc.(Non-Drug; Combo Route) route 4 (four) times daily.    JARDIANCE 25 mg tablet Take 1 tablet (25 mg total) by mouth once daily.    LANCETS MISC 1 Act by Misc.(Non-Drug; Combo Route) route 3 (three) times daily.    loratadine (CLARITIN) 10 mg tablet 1 tablet.    metoprolol tartrate (LOPRESSOR) 25 MG tablet Take 1 tablet (25 mg total) by mouth 2 (two) times daily with meals.    polyethylene glycol (GLYCOLAX) 17 gram PwPk Take by mouth.     No current facility-administered medications for this visit.     Precautions: Fall risk, Cancer    Review of patient's allergies indicates:   Allergen Reactions    Levemir [insulin detemir] Other (See Comments)     Sob rash to arms and chest  abd cramping diarrhea    Lisinopril Swelling    Metformin      Other reaction(s): severe gi upset    Shellfish containing products      Other^asthma    Tramadol hcl      Other reaction(s): anaphylaxis    Losartan Other (See Comments)     Muscle cramp       Precautions:  Pain: 2/10 at rest. 5/10 with movement. Pain established using the Numbers pain scale.   Pain location:RUE  Pain description: tight  Relieved by: rest    Dominant hand: left    Objective  Cognitive Exam  Oriented: Person, Place, Time, and Situation  Behaviors: Follows multistep  commands  Follows Commands/attention: Follows multistep  commands  Communication: clear/fluent  Memory: No Deficits noted  Safety awareness/insight to disability: intact  Coping skills/emotional control: Appropriate to situation    Physical Exam:         Joint Evaluation PROM     Left   Shoulder flex 0-180  65   Shoulder Abd 0-180  70   Shoulder ER 0-90  30   Shoulder IR 0-90  60   Shoulder Extension 0-80  40       Strength:  Right: 45.2 lbs  Left: Unable to complete     Fine motor coordination:  9 Hole peg test:  Right: 17 sec  Left: unable to complete     Tone:  Modified Gabriella " Scale:   1+ Slight increases in muscle tone, manifested by a catch, followed by minimal resistance throughout the remainder (less than half) of the ROM.     Functional Status:                                 Current        Functional Mobility:  Bed mobility: Mod I  Roll to left: Mod I  Roll to right: Mod I  Supine to sit: Mod I  Sit to supine: Mod I  Transfers to bed: min A  Transfers to toilet: Min A  Car transfers: Min A     ADL's:  Feeding: I  Grooming: Min A  Hygiene:  I  UB Dressing: Mod I  LB Dressing: Mod A  Toileting: Mod I  Bathing: Min A     IADL's:  Homecare: D  Cooking: D  Laundry: D  Yard work: D  Use of telephone: I  Money management: I  Medication management: I    TODAY'S TREATMENT    1. Lisa performed and was provided with a written copy of exercises to perform attached . Exercises were reviewed and Lisa was able to demonstrate them prior to the end of the session.   2. Pt was provided educational information, including Role of OT and OT POC    Treatment today also included: Ther act    ASSESSMENT:  OT diagnosis: R hemiparesis    Assessment  Lisa Hendricks is a 64 y.o. female with a medical diagnosis of   Encounter Diagnosis   Name Primary?    Right hemiparesis Yes    Patient can benefit from Occupational Therapy services for limitations as described in problem list below.  Patient presents with weakness in RUE and deficits with Active ROM, Fine motor coordination, ADL tolerance and functional mobility/independence. She is demonstrating RUE weakness and overall functional mobility deficits which indicates fall risk. Treatment will be focussed on improving UE strength, ROM and FMC in order to decrease fall risk, improve proper soft tissue facilitation in order to activate appropriate musculature to preform functional activities and improve overall functional independence in ADLs. The following goals below were discussed with the patient and she is in agreement with them as to be addressed in the  treatment plan.      Lisa can benefit from outpatient Occupational therapy and a home program to address the stated goals to improve impairments and functional limitations. Treatment will be directed at improve the following impairments. Rehab potential is good.     PROBLEM LIST/IMPAIRMENTS:   decreased flexibility, decreased range of motion, decreased muscle strength, decreased independence in ADLs and impaired function        STG to be completed in:  Time frame: 4 weeks  1) Pt will be able to complete  testing with RUE  2) Pt will be independent in preforming HEP.  3) Pt will use modalities at home for pain management.  4) Pt will increase shoulder passive range of motion to WNL to decrease stiffness and to promote increased ROM        LTG to be completed in:  Time frame: 8 weeks  1) Pt will report an increase in independence in activities of daily living   2) Pt will be independent in preforming HEP.  3) Pt will report an increase in ability to complete bathing to MOD I    PLAN:    Patient/caregiver understands and agrees with plan of care.    Outpatient occupational therapy 2  times weekly for 8 weeks  to include: pt ed, hep, therapeutic exercises, therapeutic activity, ADLs,  neuromuscular re-education, joint mobilizations, modalities prn,     Certification Dates: 6/18/2024 - 8/16/2024      Treatment Time: 45    I certify the need for these services furnished under this plan of treatment and while under my care.____________________________________ Physician/Referring _______________________Practitioner Date of Signature

## 2024-06-18 NOTE — PLAN OF CARE
"                                                  Physical Therapy Initial Evaluation     Name: Lisa Hendricks  [facility] Number: [id number]    Diagnosis:   Encounter Diagnoses   Name Primary?    Right hemiparesis     Gait instability      Physician: Herbert Barbour,   Treatment Orders: PT Eval and Treat  Past Medical History:   Diagnosis Date    Acute cystitis without hematuria 10/29/2020    Asthma     Cirrhosis of liver without mention of alcohol     CVA (cerebral vascular accident) 11/08/2021    Diabetes mellitus, type 2     Gout, unspecified     Hyperlipidemia     Hypertension     Obesity, unspecified     Unspecified vitamin D deficiency     Uterine cancer 2019     Current Outpatient Medications   Medication Sig    albuterol (PROVENTIL/VENTOLIN HFA) 90 mcg/actuation inhaler Inhale 2 puffs into the lungs every 6 (six) hours as needed for Wheezing. Rescue    amitriptyline (ELAVIL) 25 MG tablet Take 25 mg by mouth every evening.    amLODIPine (NORVASC) 10 MG tablet TAKE 1 TABLET(10 MG) BY MOUTH EVERY DAY    aspirin (ECOTRIN) 81 MG EC tablet Take 81 mg by mouth once daily.    azilsartan medoxomiL (EDARBI) 80 mg Tab Take 80 mg by mouth once daily.    blood sugar diagnostic Strp 1 strip by Misc.(Non-Drug; Combo Route) route 3 (three) times daily.    blood-glucose meter,continuous (DEXCOM G7 ) Misc Use to check glucose with sensors    blood-glucose sensor (DEXCOM G7 SENSOR) Meli 1 Device by Misc.(Non-Drug; Combo Route) route every 10 days.    clopidogreL (PLAVIX) 75 mg tablet Take 1 tablet (75 mg total) by mouth once daily.    gabapentin (NEURONTIN) 300 MG capsule TAKE 1 CAPSULE(300 MG) BY MOUTH EVERY EVENING    incontinence pad, liner, disp Pads 2 each by Misc.(Non-Drug; Combo Route) route 2 (two) times a day.    insulin aspart U-100 (NOVOLOG) 100 unit/mL (3 mL) InPn pen Inject 10 Units into the skin 3 (three) times daily with meals.    insulin needles, disposable, 32 x 1/4 " Ndle 1 Device by Misc.(Non-Drug; " Combo Route) route 4 (four) times daily.    JARDIANCE 25 mg tablet Take 1 tablet (25 mg total) by mouth once daily.    LANCETS MISC 1 Act by Misc.(Non-Drug; Combo Route) route 3 (three) times daily.    loratadine (CLARITIN) 10 mg tablet 1 tablet.    metoprolol tartrate (LOPRESSOR) 25 MG tablet Take 1 tablet (25 mg total) by mouth 2 (two) times daily with meals.    polyethylene glycol (GLYCOLAX) 17 gram PwPk Take by mouth.     No current facility-administered medications for this visit.     Review of patient's allergies indicates:   Allergen Reactions    Levemir [insulin detemir] Other (See Comments)     Sob rash to arms and chest  abd cramping diarrhea    Lisinopril Swelling    Metformin      Other reaction(s): severe gi upset    Shellfish containing products      Other^asthma    Tramadol hcl      Other reaction(s): anaphylaxis    Losartan Other (See Comments)     Muscle cramp       SUBJECTIVE     Patient states:  CVA November 2020 where she was at SNF for 3 months and then home. Patient reported rehab for 6 month [id number] in [place]. Patient reports she saw Dr. Barbour a few months ago and she recommended that she come here for outpatient therapy services to improve strength, mobility and balance. Patient ambulation with rolling walker limited to about 10 steps. Patient reports impaired endurance with functional activities. Patient reports she gets up once per hour to go to bathroom. Patient has a hospital bed. Patient has a personal care worker Tuesday through Friday for 9-1 pm. Worker helps with showers, meals, household activities of daily living and household activities. Patient son lives with her and helps during weekend and when personal care worker not there. Patient reports history of scoliosis and uterine cancer. Patient does have an AFO that she hasn't worn yet but they did not bring the other shoe. Physical therapist will assess mobility with AFO next visit.   Pts goals:  improve functional mobility  and be able to walk without walker.   Pain Scale: Lisa reports no pain today.     OBJECTIVE     Mental status: alert and oriented  Posture Alignment: slouched posture  Sensation: Light Touch: Intact           ROM:  UPPER EXTREMITY--AROM/PROM  (R) UE: see occupational therapist assessment  (L) UE: see occupational therapist assessment           LOWER EXTREMITY -- AROM/PROM  (R) LE: WFLs with exception of (right) ankle  (L) LE: WFLs    ROM:  Ankle Left (active range of motion) Right (passive range of motion)   Dorsiflexion WNL  (neutral) degrees   Plantarflexion WNL  (30) degrees   Inversion WNL  (15) degrees   Eversion WNL  (15) degrees     Upper Extremity Strength- see occupational therapist assessment for details    Lower Extremity Strength  Right LE  Left LE    Hip Flexion: 3-/5 Hip Flexion: 4/5   Hip Abduction: 3-/5 Hip Abduction: 4/5   Hip IR: 3-/5 Hip IR: 4/5   Hip ER: 3-/5 Hip ER: 4/5   Hip Extension: 3-/5 Hip Extension: 4/5   Knee extension: 3/5 Knee extension: 4/5   Knee flexion: 3-/5 Knee flexion: 4/5   Ankle dorsiflexion:   trace Ankle dorsiflexion:   4/5   Ankle plantarflexion: trace Ankle plantarflexion: 4/5     GAIT: Lisa ambulates 7 feet with RW with mod A.     GAIT DEVIATIONS: Lisa displays trunk flexion, right) foot pronation and ER, decreased heel strike, circumduction, wide base of support, decreased step length,     TRANSFERS: functional transfers with rolling walker min-mod assist with cueing for safety and proper hand and foot placement.       Pt/family was provided educational information, including: role of PT, goals for PT, scheduling - pt verbalized understanding. Discussed insurance limitations with pt.     Exercises were reviewed and pt was able to demonstrate them prior to the end of the session. Pt received a written copy of exercises to perform at home.  Pt has no cultural, educational or language barriers to learning provided.    FOTO: 25 see media for complete details    TREATMENT      Time In: 1000  Time Out: 1055    PT Evaluation Completed? Yes  Discussed Plan of Care with patient: Yes    Lisa received 23 minutes of neuromuscular re-education.  Developed, demonstrated and performed home exercise program. See note section for details. Heel raises/toe raises, LAQ, hip flexion, seated clams,   Written Home Exercises Provided: see note section  Lisa demo good understanding of the education provided. Patient demo good return demo of skill of exercises.    FOTO: 25 see media for complete details    ASSESSMENT   Pt prognosis is Good.  Pt will benefit from skilled outpatient physical therapy to address the above stated deficits, provide pt/family education and to maximize pt's level of independence.     Medical necessity is demonstrated by the following IMPAIRMENTS/PROBLEMS:  1. Impaired balance  2. Impaired coordination  3. Impaired strength  4. Decreased tolerance functional activities  5. Impaired postural control      Pt's spiritual, cultural and educational needs considered and pt agreeable to plan of care and goals as stated below:     Anticipated Barriers for physical therapy:     Short Term GOALS: 3 weeks. Pt agrees with goals set.  Patient demonstrates independence with home exercise program.   Patient demonstrates postural awareness with all activities.   Patient reports no falls with functional activities.     Long Term GOALS: 8  weeks. Pt agrees with goals set.  Patient demonstrates improvement in functional mobility as evidenced by improvement in FOTO to 33 or greater.   Patient demonstrates improvement in (right) ankle range of motion to impact mobility  Patient demonstrates improvement in (bilateral) LE strength by at least 1/2 grade or greater.   Patient demonstrates decreased gait deviations.   Patient demonstrates improvement in transfer technique.   Goals PRN.       PLAN     Outpatient physical therapy 2 times weekly to include: pt ed, hep, therapeutic exercises, neuromuscular  re-education/ balance exercises, manual therapy and modalities prn. Cont PT for  8 weeks. Pt may be seen by PTA as part of the rehabilitation team.   Certification dates: 6/18/2024-8/16/2024    Therapist: Pricila Dwyer, PT      I certify the need for these services furnished under this plan of treatment and while under my care.  ____________________________________ Physician/Referring Practitioner   Date of Signature     Detail Level: Generalized

## 2024-06-20 ENCOUNTER — CLINICAL SUPPORT (OUTPATIENT)
Dept: REHABILITATION | Facility: HOSPITAL | Age: 64
End: 2024-06-20
Payer: MEDICAID

## 2024-06-20 DIAGNOSIS — G81.91 RIGHT HEMIPARESIS: Primary | ICD-10-CM

## 2024-06-20 DIAGNOSIS — G81.91 RIGHT HEMIPARESIS: ICD-10-CM

## 2024-06-20 DIAGNOSIS — I63.312 CEREBROVASCULAR ACCIDENT (CVA) DUE TO THROMBOSIS OF LEFT MIDDLE CEREBRAL ARTERY: Primary | ICD-10-CM

## 2024-06-20 PROCEDURE — 97110 THERAPEUTIC EXERCISES: CPT

## 2024-06-20 PROCEDURE — 97530 THERAPEUTIC ACTIVITIES: CPT

## 2024-06-20 NOTE — PROGRESS NOTES
Physical Therapy Daily Note     Name: Lisa Hendricks  Clinic Number: [id number]  Diagnosis:   Encounter Diagnoses   Name Primary?    Cerebrovascular accident (CVA) due to thrombosis of left middle cerebral artery Yes    Right hemiparesis      Physician: Herbert Barbour,   Precautions: none  Visit #: 1 of 16  PTA Visit #: 0  Time In: 1000  Time Out: 1055    Subjective     Pt reports: no new complaints today. Patient brought (right) AFO that she hasn't worn since January.   Pain Scale: Lisa rates pain on a scale of 0-10 to be n/a currently.    Objective     Lisa received individual therapeutic exercises, activities, neuromuscular re-education to develop strength, endurance, ROM, flexibility, posture, and core stabilization for 55 minutes including:  All BOLD exercises performed today:   Mode/doffing (right) AFO. Patient needs to call orthotics company to see if they can make adjustment due to patient has not worn since January.   Standing marching in place at TM 2x10  Standing weight shifting at edge of TM 2x10  LAQ 3x10  Seated marching red theraband 3x10  Seated clams red theraband 3x10  Seated adductor squeezes 3x10     Lisa received the following manual therapy techniques: none     The patient received the following direct contact modalities after being cleared for contraindications: none    The patient received the following supervised modalities after being cleared for contradictions: none    Education provided re: fit of AFO, progression of therapy program  Lisa verbalized good understanding of education provided.   No spiritual or educational barriers to learning provided    Assessment     Patient tolerated treatment well. Patient's AFO not fitting well and needs adjustments. Physical therapist will continue to progress with current POC as appropriate and tolerable.   This is a 64 y.o. female referred to outpatient physical therapy and presents with a  medical diagnosis of   Encounter Diagnoses   Name Primary?    Cerebrovascular accident (CVA) due to thrombosis of left middle cerebral artery Yes    Right hemiparesis     and demonstrates limitations as described in the problem list. Pt prognosis is Good. Pt will continue to benefit from skilled outpatient physical therapy to address the deficits listed in the problem list, provide pt/family education and to maximize pt's level of independence in the home and community environment.     Goals as follows:  Medical necessity is demonstrated by the following IMPAIRMENTS/PROBLEMS:  1. Impaired balance  2. Impaired coordination  3. Impaired strength  4. Decreased tolerance functional activities  5. Impaired postural control        Pt's spiritual, cultural and educational needs considered and pt agreeable to plan of care and goals as stated below:      Anticipated Barriers for physical therapy:      Short Term GOALS: 3 weeks. Pt agrees with goals set.  Patient demonstrates independence with home exercise program.   Patient demonstrates postural awareness with all activities.   Patient reports no falls with functional activities.      Long Term GOALS: 8  weeks. Pt agrees with goals set.  Patient demonstrates improvement in functional mobility as evidenced by improvement in FOTO to 33 or greater.   Patient demonstrates improvement in (right) ankle range of motion to impact mobility  Patient demonstrates improvement in (bilateral) LE strength by at least 1/2 grade or greater.   Patient demonstrates decreased gait deviations.   Patient demonstrates improvement in transfer technique.   Goals PRN.         Plan     Continue with established Plan of Care towards PT goals.  Outpatient physical therapy 2 times weekly to include: pt ed, hep, therapeutic exercises, neuromuscular re-education/ balance exercises, manual therapy and modalities prn. Cont PT for  8 weeks. Pt may be seen by PTA as part of the rehabilitation team.    Certification dates: 6/18/2024-8/16/2024    Therapist: Pricila Dwyer, ROXY  6/20/2024

## 2024-06-20 NOTE — PROGRESS NOTES
Occupational Therapy Daily Treatment Note     Date: 6/20/2024  Name: Lisa Hendricks  MRN: [id number]    Diagnosis:   Encounter Diagnosis   Name Primary?    Right hemiparesis Yes     Referring Physician: Herbert Barbour DO    Evaluation Date: 6/18/2024  Plan of Care Certification Period: 6/18/2024 - 8/16/2024    Last Reassessment: 6/18/2024    Visit # / Visits authorized: 1 / 16  BUSTAMANTE visit number: 0  Time In:11:00  Time Out: 11:54  Total Billable Time: 54 minutes    Precautions:  Fall and cancer      Subjective     Pt reports: Im ok  she was compliant with home exercise program given last session.     Pain: 3/10  Location: right shoulder      Objective     Treatment consist of the following:     Lisa received the following supervised modalities after being cleared for contradictions:   -None this day    Lisa participated in dynamic functional therapeutic activities to improve functional performance, including:  --Passive range of motion 2 x 10 right side              -flexion  -abduction  -IR  -ER  -elbow flexion/extension  -wrist flexion/extension  -radial/ulnar deviation  -supination/pronation  -Digit flexion/extension (DIP, PIP, and MCP)    -Pt performed small grooved pegboard activity while seated with to increase fine motor coordination, dexterity, and motor control to increase indep with ADL tasks such as dressing and self-care tasks.     -Patient completed active assistive table slides performing R shoulder flexion, scaption, and abduction x 2 min each direction on tabletop seated to improve strength, endurance, and fx mobility for increase indep while completing ADL/IADL tasks with help of Left UE.      - Red Digiweb 3x10 duck bill      Home Exercises and Education Provided     Education provided:   - Role of OT and OT POC  - Progress towards goals     Written Home Exercises Provided: Patient instructed to cont prior HEP.  Exercises were reviewed and Lisa was able to demonstrate them prior to the end of  the session.  Lisa demonstrated good  understanding of the HEP provided.   .   See EMR under  Evaluation note  for exercises provided .        Assessment     Pt would continue to benefit from skilled OT. Pt tolerated activities well this day with min complaints of increased pain. Pt with semi hard end feels during passive range of motion. Patient educated to complete active assist range of motion at home. Pt able to complete fine motor coordination activity with increased time due to complexity of tasks.    Lisa is progressing well towards her goals and there are no updates to goals at this time. Pt prognosis is Fair.     Pt will continue to benefit from skilled OT intervention.    Patient continues to demonstrate limitation  with  ROM, Joint mobility, Stiffness, Decreased fine motor coordination, Decreased functional use, Decreased strength, and Continued pain     Goals:  STG to be completed in:  Time frame: 4 weeks  1) Pt will be able to complete  testing with RUE  2) Pt will be independent in preforming HEP.  3) Pt will use modalities at home for pain management.  4) Pt will increase shoulder passive range of motion to WNL to decrease stiffness and to promote increased ROM        LTG to be completed in:  Time frame: 8 weeks  1) Pt will report an increase in independence in activities of daily living   2) Pt will be independent in preforming HEP.  3) Pt will report an increase in ability to complete bathing to MOD I       Plan     Patient/caregiver understands and agrees with plan of care.     Outpatient occupational therapy 2  times weekly for 8 weeks  to include: pt ed, hep, therapeutic exercises, therapeutic activity, ADLs,  neuromuscular re-education, joint mobilizations, modalities prn,      Certification Dates: 6/18/2024 - 8/16/2024  Updates/Grading for next session: N/A      Logan Dwyer OT

## 2024-06-26 ENCOUNTER — CLINICAL SUPPORT (OUTPATIENT)
Dept: REHABILITATION | Facility: HOSPITAL | Age: 64
End: 2024-06-26
Payer: MEDICAID

## 2024-06-26 DIAGNOSIS — I63.312 CEREBROVASCULAR ACCIDENT (CVA) DUE TO THROMBOSIS OF LEFT MIDDLE CEREBRAL ARTERY: Primary | ICD-10-CM

## 2024-06-26 DIAGNOSIS — G89.4 CHRONIC PAIN SYNDROME: ICD-10-CM

## 2024-06-26 DIAGNOSIS — G81.91 RIGHT HEMIPARESIS: ICD-10-CM

## 2024-06-26 DIAGNOSIS — M51.36 DDD (DEGENERATIVE DISC DISEASE), LUMBAR: ICD-10-CM

## 2024-06-26 PROCEDURE — 97530 THERAPEUTIC ACTIVITIES: CPT

## 2024-06-26 PROCEDURE — 97110 THERAPEUTIC EXERCISES: CPT | Mod: CQ

## 2024-06-26 NOTE — PROGRESS NOTES
Physical Therapy Daily Note     Name: Lisa Hendricks  Clinic Number: [id number]  Diagnosis:   Encounter Diagnoses   Name Primary?    Cerebrovascular accident (CVA) due to thrombosis of left middle cerebral artery Yes    DDD (degenerative disc disease), lumbar     Chronic pain syndrome     Right hemiparesis      Physician: Herbert Barbour, DO  Precautions: none  Visit #: 2 of 16  PTA Visit #: 1  Time In: 0900  Time Out: 1000    Subjective     Pt reports: back hurts and unable to sleep on side. She walks once an hour to bathroom at home with a walker. She is waiting for AFO to return. She would like to be more independent.   Pain Scale: Lisa rates pain on a scale of 0-10 to be n/a currently.    Objective     Lisa received individual therapeutic exercises, activities, neuromuscular re-education to develop strength, endurance, ROM, flexibility, posture, and core stabilization for 30 minutes including:  All BOLD exercises performed today:      Standing balance, 5 minutes   Diaphragmatic breathing and TA exercises    Mode/doffing (right) AFO. Patient needs to call orthotics company to see if they can make adjustment due to patient has not worn since January.   Standing marching in place at TM 2x10  Standing weight shifting at edge of TM 2x10  LAQ 3x10  Seated marching red theraband 3x10  Seated clams red theraband 3x10  Seated adductor squeezes 3x10     Lisa received the following manual therapy techniques: facilitation and inhibition techniques for abdominal, scapula, quad, and glute NMR     Education provided re: transfers to left side, standing breaks, movement in RIGHT   Lisa verbalized good understanding of education provided.   No spiritual or educational barriers to learning provided    Assessment     Pt back felt better standing and on left side lying. Pt required min assist to transfer to left side. Standing balance and transfer to mat was CGA for safety.  Education regarding strengthening exercises. Pt has poor core stability and strength. Cues required for all exercise and functional mobility training.           Physical therapist will continue to progress with current POC as appropriate and tolerable.   This is a 64 y.o. female referred to outpatient physical therapy and presents with a medical diagnosis of   Encounter Diagnoses   Name Primary?    Cerebrovascular accident (CVA) due to thrombosis of left middle cerebral artery Yes    Right hemiparesis     and demonstrates limitations as described in the problem list. Pt prognosis is Good. Pt will continue to benefit from skilled outpatient physical therapy to address the deficits listed in the problem list, provide pt/family education and to maximize pt's level of independence in the home and community environment.     Goals as follows:  Medical necessity is demonstrated by the following IMPAIRMENTS/PROBLEMS:  1. Impaired balance  2. Impaired coordination  3. Impaired strength  4. Decreased tolerance functional activities  5. Impaired postural control        Pt's spiritual, cultural and educational needs considered and pt agreeable to plan of care and goals as stated below:      Anticipated Barriers for physical therapy:      Short Term GOALS: 3 weeks. Pt agrees with goals set.  Patient demonstrates independence with home exercise program.   Patient demonstrates postural awareness with all activities.   Patient reports no falls with functional activities.      Long Term GOALS: 8  weeks. Pt agrees with goals set.  Patient demonstrates improvement in functional mobility as evidenced by improvement in FOTO to 33 or greater.   Patient demonstrates improvement in (right) ankle range of motion to impact mobility  Patient demonstrates improvement in (bilateral) LE strength by at least 1/2 grade or greater.   Patient demonstrates decreased gait deviations.   Patient demonstrates improvement in transfer technique.   Goals  PRN.         Plan     Continue with established Plan of Care towards PT goals.  Outpatient physical therapy 2 times weekly to include: pt ed, hep, therapeutic exercises, neuromuscular re-education/ balance exercises, manual therapy and modalities prn. Cont PT for  8 weeks. Pt may be seen by PTA as part of the rehabilitation team.   Certification dates: 6/18/2024-8/16/2024    Therapist: Bobbi Correa, SHANNAN, CPT, CI, MS  6/26/2024

## 2024-06-26 NOTE — PROGRESS NOTES
Occupational Therapy Daily Treatment Note     Date: 6/26/2024  Name: Lisa Hendricks  MRN: [id number]    Diagnosis:        Encounter Diagnosis   Name Primary?    Right hemiparesis        Referring Physician: Herbert Barbour DO    Evaluation Date: 6/18/2024  Plan of Care Certification Period: 6/18/2024 - 8/16/2024    Last Reassessment: 6/18/2024    Visit # / Visits authorized: 2 / 16  BUSTAMANTE visit number: 0  Time In:10:00  Time Out: 11:00  Total Billable Time: 55 minutes    Precautions:  Fall and cancer      Subjective     Pt reports: Im ok  she was compliant with home exercise program given last session.     Pain: 3/10  Location: right shoulder      Objective     Treatment consist of the following:     Supervised modalities / MHP after being cleared for contradictions for 5 minutes:     - Applied MHP to (R) shoulder with double layer coverage to protect skin integrity to increase muscle elasticity and improve blood flow prior to beginning PROM and therapeutic interventions     Lisa participated in dynamic functional therapeutic activities to improve functional performance, including:  --Passive range of motion 3 x 10 right side              -flexion  -abduction  -IR  -ER  -elbow flexion/extension  -wrist flexion/extension  -radial/ulnar deviation  -supination/pronation  -Digit flexion/extension (DIP, PIP, and MCP)    - Patient performed shrugs, rolls and scapular retractation while seated with Max A to right upper extremities to increase mobility.     - Pt performed small grooved pegboard activity while seated with to increase fine motor coordination, dexterity, and motor control to increase indep with ADL tasks such as dressing and self-care tasks. (Not today)    - Patient completed trunk flexion/extension task to retrieve cones from floor level to waist level while weightbearing into right hand to improve strength, improve LE range of motion needed to complete daily tasks, and challenge dynamic sitting balance.      - Patient completed active assistive table slides performing R shoulder flexion, scaption, and abduction 2 x10 repetitions each direction on tabletop seated to improve strength, endurance, and fx mobility for increase indep while completing ADL/IADL tasks with help of Left UE.      - Red Daroneb 3x10 duck bill        Home Exercises and Education Provided     Education provided:   - Role of OT and OT POC  - Progress towards goals     Written Home Exercises Provided: Patient instructed to cont prior HEP.  Exercises were reviewed and Lisa was able to demonstrate them prior to the end of the session.  Lisa demonstrated good  understanding of the HEP provided.   .   See EMR under  Evaluation note  for exercises provided .        Assessment     Pt would continue to benefit from skilled occupational therapy to address remaining deficits. Pt tolerated activities well this day with min complaints of increased pain; mainly with shoulder flexion when nearing 90 degrees passively and with active assist. Pt with semi hard end feels during passive range of motion. Patient tolerated trunk flex/ext well with mild report of pain in right hip due to stiffness. Patient given CGA for balance to prevent falling loss of balance and assist with posture. Patient also given assistnace for right LE management. Patient educated to complete active assist range of motion at home. Patient also educated on right upper extremity support at elbow to prevent subluxation but also to not hold arm/elbow in flexed position thus decreasing range of motion and promoting non use; verbalized understanding. Patient continues to report pain with range of motion and decreased active range of motion/passive range of motion. Continue occupational therapy per plan of care.     Lisa is progressing well towards her goals and there are no updates to goals at this time. Pt prognosis is Fair.     Pt will continue to benefit from skilled OT intervention.    Patient  continues to demonstrate limitation  with  ROM, Joint mobility, Stiffness, Decreased fine motor coordination, Decreased functional use, Decreased strength, and Continued pain     Goals:  STG to be completed in:  Time frame: 4 weeks  1) Pt will be able to complete  testing with RUE  2) Pt will be independent in preforming HEP.  3) Pt will use modalities at home for pain management.  4) Pt will increase shoulder passive range of motion to WNL to decrease stiffness and to promote increased ROM        LTG to be completed in:  Time frame: 8 weeks  1) Pt will report an increase in independence in activities of daily living   2) Pt will be independent in preforming HEP.  3) Pt will report an increase in ability to complete bathing to MOD I       Plan     Patient/caregiver understands and agrees with plan of care.     Outpatient occupational therapy 2  times weekly for 8 weeks  to include: pt ed, hep, therapeutic exercises, therapeutic activity, ADLs,  neuromuscular re-education, joint mobilizations, modalities prn,      Certification Dates: 6/18/2024 - 8/16/2024  Updates/Grading for next session: N/A      Rosalba Bobby OT

## 2024-06-28 ENCOUNTER — CLINICAL SUPPORT (OUTPATIENT)
Dept: REHABILITATION | Facility: HOSPITAL | Age: 64
End: 2024-06-28
Payer: MEDICAID

## 2024-06-28 DIAGNOSIS — I63.312 CEREBROVASCULAR ACCIDENT (CVA) DUE TO THROMBOSIS OF LEFT MIDDLE CEREBRAL ARTERY: Primary | ICD-10-CM

## 2024-06-28 DIAGNOSIS — R26.81 GAIT INSTABILITY: ICD-10-CM

## 2024-06-28 DIAGNOSIS — G81.91 RIGHT HEMIPARESIS: ICD-10-CM

## 2024-06-28 PROCEDURE — 97530 THERAPEUTIC ACTIVITIES: CPT | Mod: CO

## 2024-06-28 PROCEDURE — 97110 THERAPEUTIC EXERCISES: CPT

## 2024-06-28 NOTE — PROGRESS NOTES
Occupational Therapy Daily Treatment Note     Date: 6/28/2024  Name: Lisa Hendricks  MRN: [id number]    Diagnosis:        Encounter Diagnosis   Name Primary?    Right hemiparesis        Referring Physician: Herbert Barbour DO    Evaluation Date: 6/18/2024  Plan of Care Certification Period: 6/18/2024 - 8/16/2024    Last Reassessment: 6/18/2024    Visit # / Visits authorized: 3 / 16  BUSTAMANTE visit number: 1  Time In:11:00  Time Out: 12:00  Total Billable Time: 60 minutes    Precautions:  Fall and cancer      Subjective     Pt reports: My shoulder is okay today.  she was compliant with home exercise program given last session.     Pain: 0/10  Location: right shoulder      Objective     Treatment consist of the following:     Supervised modalities / MHP after being cleared for contradictions for 0 minutes:     -Applied MHP to (R) shoulder with double layer coverage to protect skin integrity to increase muscle elasticity and improve blood flow prior to beginning PROM and therapeutic interventions (NOT TODAY)    iLsa participated in dynamic functional therapeutic activities to improve functional performance, including:  -Passive range of motion 3 x 10 right side              -flexion  -abduction  -IR  -ER  -elbow flexion/extension  -wrist flexion/extension  -radial/ulnar deviation  -supination/pronation  -Digit flexion/extension (DIP, PIP, and MCP)    -Red Flexbar bilateral   -wrist flexion/extension 2 x 15 (towel twist)  - radial and ulnar deviation 2 x 15 (door knob twist)  - supination 2 x 15 (frowns)  - pronation 2 x 15 (smiles)       -Right hand only large pegs placing 16 pegs in about 15 min    -Stimulated dressing task: Using reacher and dressing stick to ermelinda/doff pants. Yellow theraband was used to simulate waist band on pants. Pt did two attempts with min assistance. Pt used dressing stick to get over bilateral feet, and to right over knee level. Pt then stood with CGA to pull over waist level. Pt instructed to  order online if she can and bring to next session for continued practice to increase level of indep.     Home Exercises and Education Provided     Education provided:   - Role of OT and OT POC  - Progress towards goals     Written Home Exercises Provided: Patient instructed to cont prior HEP.  Exercises were reviewed and Lisa was able to demonstrate them prior to the end of the session.  Lisa demonstrated good  understanding of the HEP provided.   .   See EMR under  Evaluation note  for exercises provided .        Assessment     Pt would continue to benefit from skilled occupational therapy to address remaining deficits. Pt tolerated activities well this day with no complaints of increased pain. Pt needed substantial increase in time to complete large peg activity with affected hand. Pt reported she would like to work increasing indep at home. Pt has most difficulty with bathing and dressing. Patient also educated on right upper extremity support at elbow to prevent subluxation but also to not hold arm/elbow in flexed position thus decreasing range of motion and promoting non use; verbalized understanding. Patient continues to report pain with range of motion and decreased active range of motion/passive range of motion. Continue occupational therapy per plan of care.     Lisa is progressing well towards her goals and there are no updates to goals at this time. Pt prognosis is Fair.     Pt will continue to benefit from skilled OT intervention.    Patient continues to demonstrate limitation  with  ROM, Joint mobility, Stiffness, Decreased fine motor coordination, Decreased functional use, Decreased strength, and Continued pain     Goals:  STG to be completed in:  Time frame: 4 weeks  1) Pt will be able to complete  testing with RUE  2) Pt will be independent in preforming HEP.  3) Pt will use modalities at home for pain management.  4) Pt will increase shoulder passive range of motion to WNL to decrease stiffness  and to promote increased ROM        LTG to be completed in:  Time frame: 8 weeks  1) Pt will report an increase in independence in activities of daily living   2) Pt will be independent in preforming HEP.  3) Pt will report an increase in ability to complete bathing to MOD I       Plan     Patient/caregiver understands and agrees with plan of care.     Outpatient occupational therapy 2  times weekly for 8 weeks  to include: pt ed, hep, therapeutic exercises, therapeutic activity, ADLs,  neuromuscular re-education, joint mobilizations, modalities prn,      Certification Dates: 6/18/2024 - 8/16/2024  Updates/Grading for next session: N/A      DIANNA Whitfield/LEENA

## 2024-06-28 NOTE — PROGRESS NOTES
Physical Therapy Daily Note     Name: Lisa Hendricks  Clinic Number: [id number]  Diagnosis:   No diagnosis found.    Physician: Herbert Barbour,   Precautions: none  Visit #: 3 of 16  PTA Visit #: 1  Time In: 0900  Time Out: 1000    Subjective     Pt reports: She is not having any pain this visit. She reports she has been walking with her RW at home   Pain Scale: Lisa rates pain on a scale of 0-10 to be n/a currently.    Objective     Lisa received individual therapeutic exercises, activities, neuromuscular re-education to develop strength, endurance, ROM, flexibility, posture, and core stabilization for 30 minutes including:  All BOLD exercises performed today:      Standing balance, 6 minutes   Diaphragmatic breathing and TA exercises    Mode/doffing (right) AFO. Patient needs to call orthotics company to see if they can make adjustment due to patient has not worn since January.   Standing marching in place at TM 2x10  Standing weight shifting at edge of TM 2x10  LAQ 3x10  Seated marching red theraband 3x10  Seated clams red theraband 3x10  Seated adductor squeezes 3x10       Gait Training- 50 feet with RW. Focus on R LE control with knee flexion throughout swing phase     Lisa received the following manual therapy techniques: facilitation and inhibition techniques for abdominal, scapula, quad, and glute NMR     Education provided re: transfers to left side, standing breaks, movement in RIGHT   Lisa verbalized good understanding of education provided.   No spiritual or educational barriers to learning provided    Assessment     Patient presents with weakness in R LE this visit with treatment focusing on ensuring sequencing of R LE with standing and walking. She is able to perform sit to stands well with cuing required for control of R LE in order to avoid eversion of R foot and ER at R hip. She is able to perform with Min A from therapist. She is able to  perform sit<>stand transfer well with SBA. She is able to ambulate 50 feet with consistent cuing required to ensure proper knee flexion with some assistance provided for her knee flexion.           Physical therapist will continue to progress with current POC as appropriate and tolerable.   This is a 64 y.o. female referred to outpatient physical therapy and presents with a medical diagnosis of   Encounter Diagnoses   Name Primary?    Cerebrovascular accident (CVA) due to thrombosis of left middle cerebral artery Yes    Right hemiparesis     and demonstrates limitations as described in the problem list. Pt prognosis is Good. Pt will continue to benefit from skilled outpatient physical therapy to address the deficits listed in the problem list, provide pt/family education and to maximize pt's level of independence in the home and community environment.     Goals as follows:  Medical necessity is demonstrated by the following IMPAIRMENTS/PROBLEMS:  1. Impaired balance  2. Impaired coordination  3. Impaired strength  4. Decreased tolerance functional activities  5. Impaired postural control        Pt's spiritual, cultural and educational needs considered and pt agreeable to plan of care and goals as stated below:      Anticipated Barriers for physical therapy:      Short Term GOALS: 3 weeks. Pt agrees with goals set.  Patient demonstrates independence with home exercise program.   Patient demonstrates postural awareness with all activities.   Patient reports no falls with functional activities.      Long Term GOALS: 8  weeks. Pt agrees with goals set.  Patient demonstrates improvement in functional mobility as evidenced by improvement in FOTO to 33 or greater.   Patient demonstrates improvement in (right) ankle range of motion to impact mobility  Patient demonstrates improvement in (bilateral) LE strength by at least 1/2 grade or greater.   Patient demonstrates decreased gait deviations.   Patient demonstrates  improvement in transfer technique.   Goals PRN.         Plan     Continue with established Plan of Care towards PT goals.  Outpatient physical therapy 2 times weekly to include: pt ed, hep, therapeutic exercises, neuromuscular re-education/ balance exercises, manual therapy and modalities prn. Cont PT for  8 weeks. Pt may be seen by PTA as part of the rehabilitation team.   Certification dates: 6/18/2024-8/16/2024    Therapist: Carloz Medina, PT, DPT, MTC  6/28/2024

## 2024-07-03 ENCOUNTER — CLINICAL SUPPORT (OUTPATIENT)
Dept: REHABILITATION | Facility: HOSPITAL | Age: 64
End: 2024-07-03
Payer: MEDICAID

## 2024-07-03 DIAGNOSIS — I63.312 CEREBROVASCULAR ACCIDENT (CVA) DUE TO THROMBOSIS OF LEFT MIDDLE CEREBRAL ARTERY: Primary | ICD-10-CM

## 2024-07-03 DIAGNOSIS — E78.5 DYSLIPIDEMIA ASSOCIATED WITH TYPE 2 DIABETES MELLITUS: ICD-10-CM

## 2024-07-03 DIAGNOSIS — R26.81 GAIT INSTABILITY: ICD-10-CM

## 2024-07-03 DIAGNOSIS — G81.91 RIGHT HEMIPARESIS: Primary | ICD-10-CM

## 2024-07-03 DIAGNOSIS — G81.91 RIGHT HEMIPARESIS: ICD-10-CM

## 2024-07-03 DIAGNOSIS — M51.36 DDD (DEGENERATIVE DISC DISEASE), LUMBAR: ICD-10-CM

## 2024-07-03 DIAGNOSIS — E11.69 DYSLIPIDEMIA ASSOCIATED WITH TYPE 2 DIABETES MELLITUS: ICD-10-CM

## 2024-07-03 DIAGNOSIS — G89.4 CHRONIC PAIN SYNDROME: ICD-10-CM

## 2024-07-03 PROCEDURE — 97530 THERAPEUTIC ACTIVITIES: CPT

## 2024-07-03 PROCEDURE — 97110 THERAPEUTIC EXERCISES: CPT | Mod: CQ

## 2024-07-03 NOTE — PROGRESS NOTES
Physical Therapy Daily Note     Name: Lisa Hendricks  Clinic Number: [id number]  Diagnosis:   Encounter Diagnoses   Name Primary?    Cerebrovascular accident (CVA) due to thrombosis of left middle cerebral artery Yes    Right hemiparesis     Gait instability     DDD (degenerative disc disease), lumbar     Chronic pain syndrome        Physician: Herbert Barbour,   Precautions: none  Visit #: 4 of 16  PTA Visit #: 1  Time In: 10:00  Time Out: 1100    Subjective     Pt reports: still waiting for AFO, walking with RW, new gait belt and all ADL accessories.   Pain Scale: Lisa rates pain on a scale of 0-10 to be 2 in low back  currently.    Objective     Lisa received individual therapeutic exercises, activities, neuromuscular re-education to develop strength, endurance, ROM, flexibility, posture, and core stabilization for 30 minutes including:    All BOLD exercises performed today:     OmniCycle 15 minutes 0 resistance, 92% on porcupine.      Side stepping right and left with CGA, Min A   Scooting on mat, right and left with Min A   Standing balance, 5 minutes   Diaphragmatic breathing and TA exercises    Mode/doffing (right) AFO. Patient needs to call orthotics company to see if they can make adjustment due to patient has not worn since January.   Standing marching in place at TM 2x10  Standing weight shifting at edge of TM 2x10  LAQ 3x10  Seated marching red theraband 3x10  Seated clams red theraband 3x10  Seated adductor squeezes 3x10     Lisa received the following manual therapy techniques: facilitation and inhibition techniques for abdominal, scapula, quad, and glute NMR     Gait Training - RW, 100 feet, 25 feet, side stepping with RW, MIN A for RLE to correct everted foot    Education provided re: home side scooting or side stepping at countertop     Lisa verbalized good understanding of education provided.   No spiritual or educational barriers to learning  provided    Assessment     Pt required min assist to transfer to left side. Standing balance and transfer to mat was CGA for safety. Education regarding new strengthening exercises. Pt has improving core stability techniques, Notable: forward head and neck , and rounded bilateral shoulders. Cues required for all exercise and functional mobility training.             This is a 64 y.o. female referred to outpatient physical therapy and presents with a medical diagnosis of   Encounter Diagnoses   Name Primary?    Cerebrovascular accident (CVA) due to thrombosis of left middle cerebral artery Yes    Right hemiparesis     and demonstrates limitations as described in the problem list. Pt prognosis is Good. Pt will continue to benefit from skilled outpatient physical therapy to address the deficits listed in the problem list, provide pt/family education and to maximize pt's level of independence in the home and community environment.     Goals as follows:  Medical necessity is demonstrated by the following IMPAIRMENTS/PROBLEMS:  1. Impaired balance  2. Impaired coordination  3. Impaired strength  4. Decreased tolerance functional activities  5. Impaired postural control        Pt's spiritual, cultural and educational needs considered and pt agreeable to plan of care and goals as stated below:      Anticipated Barriers for physical therapy:      Short Term GOALS: 3 weeks. Pt agrees with goals set.  Patient demonstrates independence with home exercise program.   Patient demonstrates postural awareness with all activities.   Patient reports no falls with functional activities.      Long Term GOALS: 8  weeks. Pt agrees with goals set.  Patient demonstrates improvement in functional mobility as evidenced by improvement in FOTO to 33 or greater.   Patient demonstrates improvement in (right) ankle range of motion to impact mobility  Patient demonstrates improvement in (bilateral) LE strength by at least 1/2 grade or greater.    Patient demonstrates decreased gait deviations.   Patient demonstrates improvement in transfer technique.   Goals PRN.         Plan     Continue with established Plan of Care towards PT goals.  Outpatient physical therapy 2 times weekly to include: pt ed, hep, therapeutic exercises, neuromuscular re-education/ balance exercises, manual therapy and modalities prn. Cont PT for  8 weeks. Pt may be seen by PTA as part of the rehabilitation team.   Certification dates: 6/18/2024-8/16/2024    Therapist: Bobbi Correa, PTA, CPT, CI, MS  7/3/2024

## 2024-07-03 NOTE — PROGRESS NOTES
Occupational Therapy Daily Treatment Note     Date: 7/3/2024  Name: Lisa Hendricks  MRN: [id number]    Diagnosis:        Encounter Diagnosis   Name Primary?    Right hemiparesis        Referring Physician: Herbert Barbour DO    Evaluation Date: 6/18/2024  Plan of Care Certification Period: 6/18/2024 - 8/16/2024    Last Reassessment: 6/18/2024    Visit # / Visits authorized: 4 / 16  BUSTAMANTE visit number: 0  Time In:10:00  Time Out: 11:00  Total Billable Time: 60 minutes    Precautions:  Fall and cancer      Subjective     Pt reports: It hurts a little from walking down the adams.  she was compliant with home exercise program given last session.     Pain: 3/10  Location: right shoulder      Objective     Treatment consist of the following:     Supervised modalities / MHP after being cleared for contradictions for 0 minutes:     -Applied MHP to (R) shoulder with double layer coverage to protect skin integrity to increase muscle elasticity and improve blood flow prior to beginning PROM and therapeutic interventions (NOT TODAY)    Lisa participated in dynamic functional therapeutic activities to improve functional performance, including:  -Passive range of motion 3 x 10 right side              -flexion  -abduction  -IR  -ER  -elbow flexion/extension  -wrist flexion/extension  -radial/ulnar deviation  -supination/pronation  -Digit flexion/extension (DIP, PIP, and MCP)    -Red Flexbar bilateral   -wrist flexion/extension 2 x 15 (towel twist)  - radial and ulnar deviation 2 x 15 (door knob twist)  - supination 2 x 15 (frowns)  - pronation 2 x 15 (smiles)       -Right hand only large pegs placing 16 pegs in about 15 min    -Pt performed small grooved pegboard activity while seated with to increase fine motor coordination, dexterity, and motor control to increase indep with ADL tasks such as dressing and self-care tasks.      -Patient completed active assistive table slides performing R shoulder flexion, scaption, and  abduction x 2 min each direction on tabletop seated to improve strength, endurance, and fx mobility for increase indep while completing ADL/IADL tasks with help of Left UE.       - Red Digiweb 3x10 duck bill    -picking up and placing foam cubes in container x 2 trials    Home Exercises and Education Provided     Education provided:   - Role of OT and OT POC  - Progress towards goals     Written Home Exercises Provided: Patient instructed to cont prior HEP.  Exercises were reviewed and Lisa was able to demonstrate them prior to the end of the session.  Lisa demonstrated good  understanding of the HEP provided.   .   See EMR under  Evaluation note  for exercises provided .        Assessment     Pt would continue to benefit from skilled occupational therapy to address remaining deficits. Pt tolerated activities well this day with no complaints of increased pain. Pt needed substantial increase in time to complete large peg activity and foam pieces with affected hand. Patient continues to report pain with range of motion and decreased active range of motion/passive range of motion. Continue occupational therapy per plan of care. Patient with continued tightness in RUE while completing passive range of motion. Patient with min pain during all activities.  Lisa is progressing well towards her goals and there are no updates to goals at this time. Pt prognosis is Fair.     Pt will continue to benefit from skilled OT intervention.    Patient continues to demonstrate limitation  with  ROM, Joint mobility, Stiffness, Decreased fine motor coordination, Decreased functional use, Decreased strength, and Continued pain     Goals:  STG to be completed in:  Time frame: 4 weeks  1) Pt will be able to complete  testing with RUE  2) Pt will be independent in preforming HEP.  3) Pt will use modalities at home for pain management.  4) Pt will increase shoulder passive range of motion to WNL to decrease stiffness and to promote  increased ROM        LTG to be completed in:  Time frame: 8 weeks  1) Pt will report an increase in independence in activities of daily living   2) Pt will be independent in preforming HEP.  3) Pt will report an increase in ability to complete bathing to MOD I       Plan     Patient/caregiver understands and agrees with plan of care.     Outpatient occupational therapy 2  times weekly for 8 weeks  to include: pt ed, hep, therapeutic exercises, therapeutic activity, ADLs,  neuromuscular re-education, joint mobilizations, modalities prn,      Certification Dates: 6/18/2024 - 8/16/2024  Updates/Grading for next session: N/A      Loagn Dwyer OT

## 2024-07-05 ENCOUNTER — CLINICAL SUPPORT (OUTPATIENT)
Dept: REHABILITATION | Facility: HOSPITAL | Age: 64
End: 2024-07-05
Payer: MEDICAID

## 2024-07-05 DIAGNOSIS — G81.91 RIGHT HEMIPARESIS: ICD-10-CM

## 2024-07-05 DIAGNOSIS — R26.81 GAIT INSTABILITY: ICD-10-CM

## 2024-07-05 DIAGNOSIS — I63.312 CEREBROVASCULAR ACCIDENT (CVA) DUE TO THROMBOSIS OF LEFT MIDDLE CEREBRAL ARTERY: Primary | ICD-10-CM

## 2024-07-05 DIAGNOSIS — M51.36 DDD (DEGENERATIVE DISC DISEASE), LUMBAR: ICD-10-CM

## 2024-07-05 DIAGNOSIS — G81.91 RIGHT HEMIPARESIS: Primary | ICD-10-CM

## 2024-07-05 DIAGNOSIS — G89.4 CHRONIC PAIN SYNDROME: ICD-10-CM

## 2024-07-05 PROCEDURE — 97530 THERAPEUTIC ACTIVITIES: CPT

## 2024-07-05 PROCEDURE — 97110 THERAPEUTIC EXERCISES: CPT | Mod: CQ

## 2024-07-05 RX ORDER — EZETIMIBE 10 MG/1
10 TABLET ORAL
Qty: 90 TABLET | Refills: 3 | Status: SHIPPED | OUTPATIENT
Start: 2024-07-05

## 2024-07-05 NOTE — PROGRESS NOTES
Occupational Therapy Daily Treatment Note     Date: 7/5/2024  Name: Lisa Hendricks  MRN: [id number]    Diagnosis:        Encounter Diagnosis   Name Primary?    Right hemiparesis        Referring Physician: Herbert Barbour DO    Evaluation Date: 6/18/2024  Plan of Care Certification Period: 6/18/2024 - 8/16/2024    Last Reassessment: 6/18/2024    Visit # / Visits authorized: 5 / 16  BUSTAMANTE visit number: 0  Time In:10:00  Time Out: 11:00  Total Billable Time: 60 minutes    Precautions:  Fall and cancer      Subjective     Pt reports: It hurts a little from walking down the adams.  she was compliant with home exercise program given last session.     Pain: 3/10  Location: right shoulder      Objective     Treatment consist of the following:     Supervised modalities / MHP after being cleared for contradictions for 0 minutes:     -Applied MHP to (R) shoulder with double layer coverage to protect skin integrity to increase muscle elasticity and improve blood flow prior to beginning PROM and therapeutic interventions (NOT TODAY)    Lisa participated in dynamic functional therapeutic activities to improve functional performance, including:  -Passive range of motion 3 x 10 right side              -flexion  -abduction  -IR  -ER  -elbow flexion/extension  -wrist flexion/extension  -radial/ulnar deviation  -supination/pronation  -Digit flexion/extension (DIP, PIP, and MCP)    -Stimulated dressing task: Using reacher and dressing stick to  and place cones.    -Right hand only large pegs placing 16 pegs in about 15 min    -Pt performed small grooved pegboard activity while seated with to increase fine motor coordination, dexterity, and motor control to increase indep with ADL tasks such as dressing and self-care tasks.       - Red Digiweb 3x10 duck bill    -picking up and placing foam cubes in container x 2 trials    Home Exercises and Education Provided     Education provided:   - Role of OT and OT POC  - Progress towards  goals     Written Home Exercises Provided: Patient instructed to cont prior HEP.  Exercises were reviewed and Lisa was able to demonstrate them prior to the end of the session.  Lisa demonstrated good  understanding of the HEP provided.   .   See EMR under  Evaluation note  for exercises provided .        Assessment     Pt would continue to benefit from skilled occupational therapy to address remaining deficits. Pt tolerated activities well this day with no complaints of increased pain. Pt less increase in time than last session to complete large peg activity and foam pieces with affected hand. Patient continues to report pain with range of motion and decreased active range of motion/passive range of motion. Patient increasing ability to use hand grabber in order to complete dressing. Continue occupational therapy per plan of care. Patient with continued tightness in RUE while completing passive range of motion. Patient with min pain during all activities.  Lisa is progressing well towards her goals and there are no updates to goals at this time. Pt prognosis is Fair.     Pt will continue to benefit from skilled OT intervention.    Patient continues to demonstrate limitation  with  ROM, Joint mobility, Stiffness, Decreased fine motor coordination, Decreased functional use, Decreased strength, and Continued pain     Goals:  STG to be completed in:  Time frame: 4 weeks  1) Pt will be able to complete  testing with RUE  2) Pt will be independent in preforming HEP.  3) Pt will use modalities at home for pain management.  4) Pt will increase shoulder passive range of motion to WNL to decrease stiffness and to promote increased ROM        LTG to be completed in:  Time frame: 8 weeks  1) Pt will report an increase in independence in activities of daily living   2) Pt will be independent in preforming HEP.  3) Pt will report an increase in ability to complete bathing to MOD I       Plan     Patient/caregiver understands  and agrees with plan of care.     Outpatient occupational therapy 2  times weekly for 8 weeks  to include: pt ed, hep, therapeutic exercises, therapeutic activity, ADLs,  neuromuscular re-education, joint mobilizations, modalities prn,      Certification Dates: 6/18/2024 - 8/16/2024  Updates/Grading for next session: N/A      Logan Dwyer OT

## 2024-07-05 NOTE — PROGRESS NOTES
Physical Therapy Daily Note     Name: Lisa Hendricks  Clinic Number: [id number]  Diagnosis:   Encounter Diagnoses   Name Primary?    Cerebrovascular accident (CVA) due to thrombosis of left middle cerebral artery Yes    Right hemiparesis     Gait instability     DDD (degenerative disc disease), lumbar     Chronic pain syndrome          Physician: Herbert Barbour,   Precautions: none  Visit #: 5 of 16  PTA Visit #: 2  Time In: 9:00  Time Out: 10:00    Subjective     Pt reports: pt felt sore after last time and was worn out. Reports her back is much better. Did not walk today due to fatigue from last time.   Pain Scale: Lisa rates pain on a scale of 0-10 to be 0 .     Objective     Lisa received individual therapeutic exercises, activities, neuromuscular re-education to develop strength, endurance, ROM, flexibility, posture, and core stabilization for 30 minutes including:    All BOLD exercises performed today:     OmniCycle 10 minutes 0 resistance, 92% on cars.      Side stepping right and left with CGA, Min A   Scooting on mat, right and left with Min A   Standing balance, 5 minutes   Diaphragmatic breathing and TA exercises    Mode/doffing (right) AFO.  Pt has appt Thursday for AFO adjustment.   Standing marching in place at TM 2x10  Standing weight shifting at edge of TM 2x10  LAQ 3x10  Seated marching red theraband 3x10  Seated clams red theraband 3x10  Seated adductor squeezes 3x10     Lisa received the following manual therapy techniques: facilitation and inhibition techniques for abdominal, scapula, quad, and glute NMR     Gait Training - RW, 75 feet, 25 feet, side stepping with RW, MIN A for RLE to correct everted foot  Obstacles and navigating today.     Education provided re: home side scooting or side stepping at countertop     Lisa verbalized good understanding of education provided.   No spiritual or educational barriers to learning  provided    Assessment     Pt required min assist to transfer to left side. Standing balance and transfer to mat was CGA for safety. Pt unable to start new home program due to cleaning. Notable: forward head and neck posture:rounded bilateral shoulders. Postural Cues required for all exercises and functional mobility training.             This is a 64 y.o. female referred to outpatient physical therapy and presents with a medical diagnosis of   Encounter Diagnoses   Name Primary?    Cerebrovascular accident (CVA) due to thrombosis of left middle cerebral artery Yes    Right hemiparesis     and demonstrates limitations as described in the problem list. Pt prognosis is Good. Pt will continue to benefit from skilled outpatient physical therapy to address the deficits listed in the problem list, provide pt/family education and to maximize pt's level of independence in the home and community environment.     Goals as follows:  Medical necessity is demonstrated by the following IMPAIRMENTS/PROBLEMS:  1. Impaired balance  2. Impaired coordination  3. Impaired strength  4. Decreased tolerance functional activities  5. Impaired postural control        Pt's spiritual, cultural and educational needs considered and pt agreeable to plan of care and goals as stated below:      Anticipated Barriers for physical therapy:      Short Term GOALS: 3 weeks. Pt agrees with goals set.  Patient demonstrates independence with home exercise program.   Patient demonstrates postural awareness with all activities.   Patient reports no falls with functional activities.      Long Term GOALS: 8  weeks. Pt agrees with goals set.  Patient demonstrates improvement in functional mobility as evidenced by improvement in FOTO to 33 or greater.   Patient demonstrates improvement in (right) ankle range of motion to impact mobility  Patient demonstrates improvement in (bilateral) LE strength by at least 1/2 grade or greater.   Patient demonstrates decreased  gait deviations.   Patient demonstrates improvement in transfer technique.   Goals PRN.         Plan     Continue with established Plan of Care towards PT goals.  Outpatient physical therapy 2 times weekly to include: pt ed, hep, therapeutic exercises, neuromuscular re-education/ balance exercises, manual therapy and modalities prn. Cont PT for  8 weeks. Pt may be seen by PTA as part of the rehabilitation team.   Certification dates: 6/18/2024-8/16/2024    Therapist: Bobbi Correa, PTA, CPT, CI, MS  7/5/2024

## 2024-07-10 ENCOUNTER — CLINICAL SUPPORT (OUTPATIENT)
Dept: REHABILITATION | Facility: HOSPITAL | Age: 64
End: 2024-07-10
Payer: MEDICAID

## 2024-07-10 DIAGNOSIS — R26.81 GAIT INSTABILITY: ICD-10-CM

## 2024-07-10 DIAGNOSIS — G89.4 CHRONIC PAIN SYNDROME: ICD-10-CM

## 2024-07-10 DIAGNOSIS — G81.91 RIGHT HEMIPARESIS: Primary | ICD-10-CM

## 2024-07-10 DIAGNOSIS — M51.36 DDD (DEGENERATIVE DISC DISEASE), LUMBAR: ICD-10-CM

## 2024-07-10 DIAGNOSIS — G81.91 RIGHT HEMIPARESIS: ICD-10-CM

## 2024-07-10 DIAGNOSIS — I63.312 CEREBROVASCULAR ACCIDENT (CVA) DUE TO THROMBOSIS OF LEFT MIDDLE CEREBRAL ARTERY: Primary | ICD-10-CM

## 2024-07-10 PROCEDURE — 97530 THERAPEUTIC ACTIVITIES: CPT

## 2024-07-10 PROCEDURE — 97110 THERAPEUTIC EXERCISES: CPT | Mod: CQ

## 2024-07-10 NOTE — PROGRESS NOTES
Physical Therapy Daily Note     Name: Lisa Hendricks  Clinic Number: [id number]  Diagnosis:   Encounter Diagnoses   Name Primary?    Cerebrovascular accident (CVA) due to thrombosis of left middle cerebral artery Yes    Right hemiparesis     Gait instability     DDD (degenerative disc disease), lumbar     Chronic pain syndrome          Physician: Herbert Barbour,   Precautions: none  Visit #: 6 of 16  PTA Visit #: 3  Time In: 9:00  Time Out: 10:00    Subjective     Pt reports: pt ambulating into clinic with PCA, uses a bed lift and chair lift at home. Walks hourly with RW.    Pain Scale: Lisa rates pain on a scale of 0-10 to be 2, RLE KNEE  Objective     Lisa received individual therapeutic exercises, activities, neuromuscular re-education to develop strength, endurance, ROM, flexibility, posture, and core stabilization for 30 minutes including:    All BOLD exercises performed today:     OmniCycle 10 minutes 0 resistance, 92% on cars.      Side stepping right and left with CGA, Min A   Scooting on mat, right and left with Min A   Standing balance, 5 minutes   Diaphragmatic breathing and TA exercises    Mode/doffing (right) AFO.  Pt has appt Thursday for AFO adjustment.   Standing marching in place at TM 2x10  Standing weight shifting at edge of TM 2x10  LAQ 3x10  Seated RLE hip flexion with foot desensitizing exercise with towel    Seated clams red theraband 3x10  Seated adductor squeezes 3x10     Lisa received the following manual therapy techniques: facilitation and inhibition techniques for abdominal, scapula, quad, and glute NMR     Gait Training - RW, 75 feet, 25 feet, side stepping with RW, MIN A for RLE to correct everted foot  Obstacles and navigating today.     Education provided re: home side scooting or side stepping at countertop     Lisa verbalized good understanding of education provided.   No spiritual or educational barriers to learning  provided    Assessment     Emphasis on preparing for AFO usage (tmw appt), diabetic foot care and sensory exercises for RLE. Taught home RLE hip and knee flexion exercise with foot proprioception.   Pt enjoyed session and understood importance of RLE motion and sensation.     Pt required min assist to transfer to left side. Standing balance and transfer to mat was CGA for safety. Notable: forward head and neck posture:rounded bilateral shoulders. Postural Cues required for all exercises and functional mobility and gait training.            This is a 64 y.o. female referred to outpatient physical therapy and presents with a medical diagnosis of   Encounter Diagnoses   Name Primary?    Cerebrovascular accident (CVA) due to thrombosis of left middle cerebral artery Yes    Right hemiparesis     and demonstrates limitations as described in the problem list. Pt prognosis is Good. Pt will continue to benefit from skilled outpatient physical therapy to address the deficits listed in the problem list, provide pt/family education and to maximize pt's level of independence in the home and community environment.     Goals as follows:  Medical necessity is demonstrated by the following IMPAIRMENTS/PROBLEMS:  1. Impaired balance  2. Impaired coordination  3. Impaired strength  4. Decreased tolerance functional activities  5. Impaired postural control        Pt's spiritual, cultural and educational needs considered and pt agreeable to plan of care and goals as stated below:      Anticipated Barriers for physical therapy:      Short Term GOALS: 3 weeks. Pt agrees with goals set.  Patient demonstrates independence with home exercise program.   Patient demonstrates postural awareness with all activities.   Patient reports no falls with functional activities.      Long Term GOALS: 8  weeks. Pt agrees with goals set.  Patient demonstrates improvement in functional mobility as evidenced by improvement in FOTO to 33 or greater.   Patient  demonstrates improvement in (right) ankle range of motion to impact mobility  Patient demonstrates improvement in (bilateral) LE strength by at least 1/2 grade or greater.   Patient demonstrates decreased gait deviations.   Patient demonstrates improvement in transfer technique.   Goals PRN.         Plan     Continue with established Plan of Care towards PT goals.  Outpatient physical therapy 2 times weekly to include: pt ed, hep, therapeutic exercises, neuromuscular re-education/ balance exercises, manual therapy and modalities prn. Cont PT for  8 weeks. Pt may be seen by PTA as part of the rehabilitation team.   Certification dates: 6/18/2024-8/16/2024    Therapist: Bobbi Correa, PTA, CPT, CI, MS  7/10/2024

## 2024-07-10 NOTE — PROGRESS NOTES
Occupational Therapy Daily Treatment Note     Date: 7/10/2024  Name: Lisa Hendricks  MRN: [id number]    Diagnosis:        Encounter Diagnosis   Name Primary?    Right hemiparesis        Referring Physician: Herbert Barbour DO    Evaluation Date: 6/18/2024  Plan of Care Certification Period: 6/18/2024 - 8/16/2024    Last Reassessment: 6/18/2024    Visit # / Visits authorized: 6 / 16  BUSTAMANTE visit number: 0  Time In:10:00  Time Out: 10:55  Total Billable Time: 55 minutes    Precautions:  Fall and cancer      Subjective     Pt reports: Im ok.  she was compliant with home exercise program given last session.     Pain: 3/10  Location: right shoulder      Objective     Treatment consist of the following:     Supervised modalities / MHP after being cleared for contradictions for 0 minutes:     -Applied MHP to (R) shoulder with double layer coverage to protect skin integrity to increase muscle elasticity and improve blood flow prior to beginning PROM and therapeutic interventions (NOT TODAY)    Lisa participated in dynamic functional therapeutic activities to improve functional performance, including:  -Passive range of motion 3 x 10 right side              -flexion  -abduction  -IR  -ER  -elbow flexion/extension  -wrist flexion/extension  -radial/ulnar deviation  -supination/pronation  -Digit flexion/extension (DIP, PIP, and MCP)    -Stimulated dressing task: Using reacher and dressing stick to  and place cones, screws and bolts, and large pegs.    -Right hand only large pegs placing 16 pegs in about 15 min    -Pt performed small grooved pegboard activity while seated with to increase fine motor coordination, dexterity, and motor control to increase indep with ADL tasks such as dressing and self-care tasks.       - Red Digiweb 3x10 duck bill    -picking up and placing foam cubes in container x 2 trials    Home Exercises and Education Provided     Education provided:   - Role of OT and OT POC  - Progress towards  goals     Written Home Exercises Provided: Patient instructed to cont prior HEP.  Exercises were reviewed and Lisa was able to demonstrate them prior to the end of the session.  Lisa demonstrated good  understanding of the HEP provided.   .   See EMR under  Evaluation note  for exercises provided .        Assessment     Pt would continue to benefit from skilled occupational therapy to address remaining deficits. Pt tolerated activities well this day with no complaints of increased pain. Pt performed well with completing large peg activity and foam pieces with affected hand. Patient continues to report pain with range of motion and decreased active range of motion/passive range of motion. Patient completed hand grabber activity well being able to use bilateral hands to grab items from floor in various reach distances. Patient increasing ability to use hand grabber in order to complete dressing. Continue occupational therapy per plan of care. Patient with continued tightness in RUE while completing passive range of motion. Patient with min pain during all activities.  Lisa is progressing well towards her goals and there are no updates to goals at this time. Pt prognosis is Fair.     Pt will continue to benefit from skilled OT intervention.    Patient continues to demonstrate limitation  with  ROM, Joint mobility, Stiffness, Decreased fine motor coordination, Decreased functional use, Decreased strength, and Continued pain     Goals:  STG to be completed in:  Time frame: 4 weeks  1) Pt will be able to complete  testing with RUE  2) Pt will be independent in preforming HEP.  3) Pt will use modalities at home for pain management.  4) Pt will increase shoulder passive range of motion to WNL to decrease stiffness and to promote increased ROM        LTG to be completed in:  Time frame: 8 weeks  1) Pt will report an increase in independence in activities of daily living   2) Pt will be independent in preforming HEP.  3)  Pt will report an increase in ability to complete bathing to MOD I       Plan     Patient/caregiver understands and agrees with plan of care.     Outpatient occupational therapy 2  times weekly for 8 weeks  to include: pt ed, hep, therapeutic exercises, therapeutic activity, ADLs,  neuromuscular re-education, joint mobilizations, modalities prn,      Certification Dates: 6/18/2024 - 8/16/2024  Updates/Grading for next session: N/A      Logan Dwyer OT

## 2024-07-12 ENCOUNTER — CLINICAL SUPPORT (OUTPATIENT)
Dept: REHABILITATION | Facility: HOSPITAL | Age: 64
End: 2024-07-12
Payer: MEDICAID

## 2024-07-12 DIAGNOSIS — G81.91 RIGHT HEMIPARESIS: Primary | ICD-10-CM

## 2024-07-12 DIAGNOSIS — I63.312 CEREBROVASCULAR ACCIDENT (CVA) DUE TO THROMBOSIS OF LEFT MIDDLE CEREBRAL ARTERY: Primary | ICD-10-CM

## 2024-07-12 DIAGNOSIS — I63.312 CEREBROVASCULAR ACCIDENT (CVA) DUE TO THROMBOSIS OF LEFT MIDDLE CEREBRAL ARTERY: ICD-10-CM

## 2024-07-12 PROCEDURE — 97110 THERAPEUTIC EXERCISES: CPT

## 2024-07-12 PROCEDURE — 97530 THERAPEUTIC ACTIVITIES: CPT | Mod: CO

## 2024-07-12 NOTE — PROGRESS NOTES
Occupational Therapy Daily Treatment Note     Date: 7/12/2024  Name: Lisa Hendricks  MRN: [id number]    Diagnosis:        Encounter Diagnosis   Name Primary?    Right hemiparesis        Referring Physician: Herbert Barbour DO    Evaluation Date: 6/18/2024  Plan of Care Certification Period: 6/18/2024 - 8/16/2024    Last Reassessment: 6/18/2024    Visit # / Visits authorized: 7 / 16  BUSTAMANTE visit number: 1  Time In: 10:00 AM  Time Out: 10:59 AM  Total Billable Time: 59 minutes    Precautions:  Fall and cancer      Subjective     Pt reports: Im ok.  she was compliant with home exercise program given last session.     Pain: 3/10  Location: right shoulder      Objective     Treatment consist of the following:     Supervised modalities / MHP after being cleared for contradictions for 0 minutes:     Lisa tolerated the following manual therapy:  -Passive range of motion  x 5 right side              -flexion  -abduction (pain)  -IR (pain)  -ER (pain)  -elbow flexion/extension  -wrist flexion/extension  -radial/ulnar deviation  -supination/pronation  -Digit flexion/extension (DIP, PIP, and MCP)    Lisa participated in dynamic functional therapeutic activities to improve functional performance, including:  -Stimulated dressing task upper body and lower body dressing:              -upper body dressing (donning): patient instructed on lindsay dressing technique, ermelinda affected side first, pull shirt all the way to elbow. Then put other arm in and pull over head. Pull shirt over affected side shoulder if not down. Then pull shirt down. After visual demonstration patient able to complete with SBA with min verbal cues to complete tasks.              -upper body dressing (doffing): start by grabbing back of shirt near tag area and pull over head. Then take non affected arm out first then the affected arm out. After visual demonstration, patient able to complete with mod I with no cues for completion.               -lower body dressing  (donning): Start with finding the back of the pants (locate the tag) then place on ground with tag towards the back. Use the dressing slick/reacher to get over bilateral feet. Then work waist band of pants to knee level of until he can reach waist band. Pull to just above knee level, widen legs so waist band does not fall back down to floor. Using RW patient stood and worked the waist band to over hip level on both sides. After visual demonstration x 2 patient able to complete tasks with mod A for completion. Pt continues to need assistance to pull over affected side up over hip level.              -lower body dressing (doffing): Using RW, patient stood and worked the waist band down on each hip. Once both sides are off hips, patient sat and continued to pull down the waist band. Pt needed to raise affected lower extremity up due to waist band not being low enough. Pt then used reacher/dressing stick to pull off bilateral feet. After visual demonstration, patient completed with SBA for completion of tasks.     -Lower body dressing donning/doffing shoes:   -Pt started by taking shoe of pulling heel off with opposite foot.Pt able to do with mod I to doff shoes.    -Donning shoe: Pt started by putting toes into shoe first. Once only toes were in, patient used long handles shoe horn to put straight behind heel. Pt with much difficulty with this step, needing like mod/max assistance. She was unsuccessful at putting in correct spot this session. Therapist help shoe horn in correct spot, patient was able to slide shoe on. Once shoe was on, patient lifted onto small stood within reach and was able to adjust shoe properly. Pt able to do all steps except hold shoe horn into correct spot. Will continue to work with ermelinda shoes. Only left foot was attempted today due to drop foot orthotic on right foot. Once patient is able to master left shoe, will start working with right shoe and orthotic.       Home Exercises and Education  Provided     Education provided:   - Role of OT and OT POC  - Progress towards goals     Written Home Exercises Provided: Patient instructed to cont prior HEP.  Exercises were reviewed and Lisa was able to demonstrate them prior to the end of the session.  Lisa demonstrated good  understanding of the HEP provided.   .   See EMR under  Evaluation note  for exercises provided .        Assessment     Pt would continue to benefit from skilled occupational therapy to address remaining deficits. Pt tolerated activities well this day with no complaints of increased pain. Patient continues to report pain with range of motion and decreased active range of motion/passive range of motion. Pt session this day focused on ADL re-training. Pt able to ermelinda/doff lower body pants/briefs with mod assistance due to not being able to pull up on affected side. Upper body dressing with long sleeve shirt, patient is SBA using over head methods. Pt continues with significant trouble with donning shoes and socks; will continue to work on. Continue occupational therapy per plan of care. Pt noted with soft end ranges in all planes during passive range of motion. Occupational therapist /BUSTAMANTE collaboration to discuss POC, improvements, and d/c planning on todays date.      Lisa is progressing well towards her goals and there are no updates to goals at this time. Pt prognosis is Fair.     Pt will continue to benefit from skilled OT intervention.    Patient continues to demonstrate limitation  with  ROM, Joint mobility, Stiffness, Decreased fine motor coordination, Decreased functional use, Decreased strength, and Continued pain     Goals:  STG to be completed in:  Time frame: 4 weeks  1) Pt will be able to complete  testing with RUE  2) Pt will be independent in preforming HEP.  3) Pt will use modalities at home for pain management.  4) Pt will increase shoulder passive range of motion to WNL to decrease stiffness and to promote increased  ROM        LTG to be completed in:  Time frame: 8 weeks  1) Pt will report an increase in independence in activities of daily living   2) Pt will be independent in preforming HEP.  3) Pt will report an increase in ability to complete bathing to MOD I       Plan     Patient/caregiver understands and agrees with plan of care.     Outpatient occupational therapy 2  times weekly for 8 weeks  to include: pt ed, hep, therapeutic exercises, therapeutic activity, ADLs,  neuromuscular re-education, joint mobilizations, modalities prn,      Certification Dates: 6/18/2024 - 8/16/2024  Updates/Grading for next session: N/A      HENRRY Whitfield    Occupational therapist-[facility] POC MEETING  [] I certify that face-to-face meeting with [facility] regarding this patient's future POC occurred. Occupational therapist/occupational therapy Assistant met face to face to discuss pt's treatment plan and progress towards established goals. Pt will be seen by a occupational therapist minimally every 6th visit or every 30 days.    OT:   Date:    [x] I certify that face-to-face meeting with OT regarding this patient's future POC occurred. Occupational therapist/occupational therapy Assistant met face to face to discuss pt's treatment plan and progress towards established goals. Pt will be seen by a occupational therapist minimally every 6th visit or every 30 days.    [facility]: HENRRY Whitfield  Date: 7/12/24

## 2024-07-12 NOTE — PROGRESS NOTES
Physical Therapy Daily Note     Name: Lisa Hendricks  Clinic Number: [id number]  Diagnosis:   No diagnosis found.        Physician: Herbert Barbour,   Precautions: none  Visit #: 7 of 16  PTA Visit #: 3  Time In: 0900  Time Out: 1000    Subjective     Pt reports: she has been walking at home to and from the bathroom. She has been moving well overall, but she has pain in her RLE from walking from the parking lot.     Pain Scale: Lisa rates pain on a scale of 0-10 to be 2, RLE KNEE  Objective     Lisa received individual therapeutic exercises, activities, neuromuscular re-education to develop strength, endurance, ROM, flexibility, posture, and core stabilization for 30 minutes including:    All BOLD exercises performed today:     OmniCycle 15 minutes 0 resistance, 92% on cars.      Side stepping right and left with CGA, Min A   Scooting on mat, right and left with Min A   Standing balance, 5 minutes   Diaphragmatic breathing and TA exercises    Mode/doffing (right) AFO.    Standing marching in place at TM 2x10  Standing weight shifting at edge of TM 2x10  LAQ 3x10  Seated RLE hip flexion with foot desensitizing exercise with towel    Seated clams red theraband 3x10  Seated adductor squeezes 3x10     Lisa received the following manual therapy techniques: stretching of B hip external rotators    Gait Training - RW, 75 feet, 25 feet, side stepping with RW, MIN A for RLE to correct everted foot  Obstacles and navigating today.     Education provided re: home side scooting or side stepping at countertop     Lisa verbalized good understanding of education provided.   No spiritual or educational barriers to learning provided    Assessment     Patient tolerated treatment well. Lighter session secondary to increased LLE pain today. Donned AFO and patient tolerating well. Observable BLE external rotation secondary to hip tightness. Improved with manual stretching. Progress  per patient tolerance. Focus on improving Hip ROM for better ambulation and functional mobility. Min assist with bed mobility and STS to correct posterior lean.    Pt required min assist to transfer to left side. Standing balance and transfer to mat was CGA for safety. Notable: forward head and neck posture:rounded bilateral shoulders. Postural Cues required for all exercises and functional mobility and gait training.            This is a 64 y.o. female referred to outpatient physical therapy and presents with a medical diagnosis of   Encounter Diagnoses   Name Primary?    Cerebrovascular accident (CVA) due to thrombosis of left middle cerebral artery Yes    Right hemiparesis     and demonstrates limitations as described in the problem list. Pt prognosis is Good. Pt will continue to benefit from skilled outpatient physical therapy to address the deficits listed in the problem list, provide pt/family education and to maximize pt's level of independence in the home and community environment.     Goals as follows:  Medical necessity is demonstrated by the following IMPAIRMENTS/PROBLEMS:  1. Impaired balance  2. Impaired coordination  3. Impaired strength  4. Decreased tolerance functional activities  5. Impaired postural control        Pt's spiritual, cultural and educational needs considered and pt agreeable to plan of care and goals as stated below:      Anticipated Barriers for physical therapy:      Short Term GOALS: 3 weeks. Pt agrees with goals set.  Patient demonstrates independence with home exercise program.   Patient demonstrates postural awareness with all activities.   Patient reports no falls with functional activities.      Long Term GOALS: 8  weeks. Pt agrees with goals set.  Patient demonstrates improvement in functional mobility as evidenced by improvement in FOTO to 33 or greater.   Patient demonstrates improvement in (right) ankle range of motion to impact mobility  Patient demonstrates improvement  in (bilateral) LE strength by at least 1/2 grade or greater.   Patient demonstrates decreased gait deviations.   Patient demonstrates improvement in transfer technique.   Goals PRN.         Plan     Continue with established Plan of Care towards PT goals.  Outpatient physical therapy 2 times weekly to include: pt ed, hep, therapeutic exercises, neuromuscular re-education/ balance exercises, manual therapy and modalities prn. Cont PT for  8 weeks. Pt may be seen by PTA as part of the rehabilitation team.   Certification dates: 6/18/2024-8/16/2024    Therapist: Silvio Conner, PT, DPT  7/12/2024

## 2024-07-24 ENCOUNTER — CLINICAL SUPPORT (OUTPATIENT)
Dept: REHABILITATION | Facility: HOSPITAL | Age: 64
End: 2024-07-24
Payer: MEDICAID

## 2024-07-24 ENCOUNTER — PATIENT MESSAGE (OUTPATIENT)
Dept: ADMINISTRATIVE | Facility: HOSPITAL | Age: 64
End: 2024-07-24
Payer: MEDICAID

## 2024-07-24 DIAGNOSIS — G81.91 RIGHT HEMIPARESIS: Primary | ICD-10-CM

## 2024-07-24 DIAGNOSIS — I63.312 CEREBROVASCULAR ACCIDENT (CVA) DUE TO THROMBOSIS OF LEFT MIDDLE CEREBRAL ARTERY: Primary | ICD-10-CM

## 2024-07-24 DIAGNOSIS — G81.91 RIGHT HEMIPARESIS: ICD-10-CM

## 2024-07-24 DIAGNOSIS — M51.36 DDD (DEGENERATIVE DISC DISEASE), LUMBAR: ICD-10-CM

## 2024-07-24 DIAGNOSIS — R26.81 GAIT INSTABILITY: ICD-10-CM

## 2024-07-24 PROCEDURE — 97110 THERAPEUTIC EXERCISES: CPT | Mod: CQ

## 2024-07-24 PROCEDURE — 97530 THERAPEUTIC ACTIVITIES: CPT

## 2024-07-24 NOTE — PROGRESS NOTES
Occupational Therapy Daily Treatment Note     Date: 7/24/2024  Name: Lisa Hendricks  MRN: [id number]    Diagnosis:        Encounter Diagnosis   Name Primary?    Right hemiparesis        Referring Physician: Herbert Barbour DO    Evaluation Date: 6/18/2024  Plan of Care Certification Period: 6/18/2024 - 8/16/2024    Last Reassessment: 6/18/2024    Visit # / Visits authorized: 8/ 16  BUSTAMANTE visit number: 0  Time In: 10:00 AM  Time Out: 10:59 AM  Total Billable Time: 59 minutes    Precautions:  Fall and cancer      Subjective     Pt reports: Im ok.  she was compliant with home exercise program given last session.     Pain: 0/10  Location: right shoulder      Objective     Treatment consist of the following:     Supervised modalities / MHP after being cleared for contradictions for 0 minutes:     Lisa tolerated the following manual therapy:  -Passive range of motion  x 5 right side              -flexion  -abduction (pain)  -IR (pain)  -ER (pain)  -elbow flexion/extension  -wrist flexion/extension  -radial/ulnar deviation  -supination/pronation  -Digit flexion/extension (DIP, PIP, and MCP)    Lisa participated in dynamic functional therapeutic activities to improve functional performance, including:  -Stimulated dressing task: Using reacher and dressing stick to  and place cones.     -Right hand only large pegs placing 16 pegs in about 15 min     -Pt performed clothespin ax seated on incline slope to increase intrinsic and extrinsic muscles of the hand, dexterity skills, and pinches such as lateral, tripod, and pincer to increase indep with all tasks.       - Red Digiweb 3x10 duck bill     -picking up and placing foam cubes in container x 2 trials    -red Flexbar  -wrist flexion/extension 2 x 15 (towel twist)  - radial and ulnar deviation 2 x 15 (door knob twist)  - supination 2 x 15 (frowns)  - pronation 2 x 15 (smiles)      Home Exercises and Education Provided     Education provided:   - Role of OT and OT  POC  - Progress towards goals     Written Home Exercises Provided: Patient instructed to cont prior HEP.  Exercises were reviewed and Lisa was able to demonstrate them prior to the end of the session.  Lisa demonstrated good  understanding of the HEP provided.   .   See EMR under  Evaluation note  for exercises provided .        Assessment     Pt would continue to benefit from skilled occupational therapy to address remaining deficits. Pt tolerated activities well this day with no complaints of increased pain. Patient continues to report pain with range of motion and decreased active range of motion/passive range of motion. Patient able to complete all activity this day with increased time due to difficulty of tasks. Patient noted with decreased dropping of items from LUE and increased pinch strength during reacher activity. Continue occupational therapy per plan of care. Pt noted with soft end ranges in all planes during passive range of motion. Occupational therapist /BUSTAMANTE collaboration to discuss POC, improvements, and d/c planning on todays date.      Lisa is progressing well towards her goals and there are no updates to goals at this time. Pt prognosis is Fair.     Pt will continue to benefit from skilled OT intervention.    Patient continues to demonstrate limitation  with  ROM, Joint mobility, Stiffness, Decreased fine motor coordination, Decreased functional use, Decreased strength, and Continued pain     Goals:  STG to be completed in:  Time frame: 4 weeks  1) Pt will be able to complete  testing with RUE  2) Pt will be independent in preforming HEP.  3) Pt will use modalities at home for pain management.  4) Pt will increase shoulder passive range of motion to WNL to decrease stiffness and to promote increased ROM        LTG to be completed in:  Time frame: 8 weeks  1) Pt will report an increase in independence in activities of daily living   2) Pt will be independent in preforming HEP.  3) Pt will  report an increase in ability to complete bathing to MOD I       Plan     Patient/caregiver understands and agrees with plan of care.     Outpatient occupational therapy 2  times weekly for 8 weeks  to include: pt ed, hep, therapeutic exercises, therapeutic activity, ADLs,  neuromuscular re-education, joint mobilizations, modalities prn,      Certification Dates: 6/18/2024 - 8/16/2024  Updates/Grading for next session: N/A      Logan Dwyer OT    Occupational therapist-[facility] POC MEETING  [x] I certify that face-to-face meeting with [facility] regarding this patient's future POC occurred. Occupational therapist/occupational therapy Assistant met face to face to discuss pt's treatment plan and progress towards established goals. Pt will be seen by a occupational therapist minimally every 6th visit or every 30 days.    OT: Logan Dwyer OTR/L  Date: 7/12/2024    [x] I certify that face-to-face meeting with OT regarding this patient's future POC occurred. Occupational therapist/occupational therapy Assistant met face to face to discuss pt's treatment plan and progress towards established goals. Pt will be seen by a occupational therapist minimally every 6th visit or every 30 days.    [facility]: Logan Dwyer OT  Date: 7/12/24

## 2024-07-24 NOTE — PROGRESS NOTES
Physical Therapy Daily Note     Name: Lisa Hendricks  Clinic Number: [id number]  Diagnosis:   Encounter Diagnoses   Name Primary?    Cerebrovascular accident (CVA) due to thrombosis of left middle cerebral artery Yes    Right hemiparesis     Gait instability     DDD (degenerative disc disease), lumbar            Physician: Herbert Barbour, DO  Precautions: none  Visit #: 8 of 16  PTA Visit #: 2  Time In: 0900  Time Out: 1000  FOTO: 51 today  FOTO: 25 initial     Subjective     Pt reports: AFO is bothering her skin. Did not use it today. Has pain in RLE Knee and hip.     Pain Scale: Lisa rates pain on a scale of 0-10 to be 2, RLE KNEE  Objective     Lisa received individual therapeutic exercises, activities, neuromuscular re-education to develop strength, endurance, ROM, flexibility, posture, and core stabilization for 30 minutes including:    All BOLD exercises performed today:     OmniCycle 15 minutes 0 resistance, 92% on cars.      Side stepping right and left with CGA, Min A   Scooting on mat, right and left with Min A   Standing balance, 5 minutes   Diaphragmatic breathing and TA exercises  Single leg RLE bridging   Side lying assisted modified clam  Side lying assisted hip flexor stretch         Mode/doffing (right) AFO.    Standing marching in place at TM 2x10  Standing weight shifting at edge of TM 2x10  LAQ 3x10  Seated RLE hip flexion with foot desensitizing exercise with towel    Seated clams red theraband 3x10  Seated adductor squeezes 3x10     Lisa received the following manual therapy techniques: stretching of RLE  hip external rotators and flexors     Gait Training - RW, 25 feet, side stepping with RW, MIN A for RLE to correct everted foot  Cues to use RLE Hip     Education provided re: Recommend tube sock to wear AFO.     Lisa verbalized good understanding of education provided.   No spiritual or educational barriers to learning  provided    Assessment       Pt required min assist to transfer to left side. Standing balance and transfer to mat was CGA for safety. Notable: forward head and neck posture:rounded bilateral shoulders. Postural Cues required for gait training to initiate hip flexor on internal rotators. Education for AFO usage, hip controls the movement. Pt has limited PROM RLE hip flexors.             This is a 64 y.o. female referred to outpatient physical therapy and presents with a medical diagnosis of   Encounter Diagnoses   Name Primary?    Cerebrovascular accident (CVA) due to thrombosis of left middle cerebral artery Yes    Right hemiparesis     and demonstrates limitations as described in the problem list. Pt prognosis is Good. Pt will continue to benefit from skilled outpatient physical therapy to address the deficits listed in the problem list, provide pt/family education and to maximize pt's level of independence in the home and community environment.     Goals as follows:  Medical necessity is demonstrated by the following IMPAIRMENTS/PROBLEMS:  1. Impaired balance  2. Impaired coordination  3. Impaired strength  4. Decreased tolerance functional activities  5. Impaired postural control        Pt's spiritual, cultural and educational needs considered and pt agreeable to plan of care and goals as stated below:      Anticipated Barriers for physical therapy:      Short Term GOALS: 3 weeks. Pt agrees with goals set.  Patient demonstrates independence with home exercise program.   Patient demonstrates postural awareness with all activities.   Patient reports no falls with functional activities.      Long Term GOALS: 8  weeks. Pt agrees with goals set.  Patient demonstrates improvement in functional mobility as evidenced by improvement in FOTO to 33 or greater.   Patient demonstrates improvement in (right) ankle range of motion to impact mobility  Patient demonstrates improvement in (bilateral) LE strength by at least 1/2  grade or greater.   Patient demonstrates decreased gait deviations.   Patient demonstrates improvement in transfer technique.   Goals PRN.         Plan     Continue with established Plan of Care towards PT goals.  Outpatient physical therapy 2 times weekly to include: pt ed, hep, therapeutic exercises, neuromuscular re-education/ balance exercises, manual therapy and modalities prn. Cont PT for  8 weeks. Pt may be seen by PTA as part of the rehabilitation team.   Certification dates: 6/18/2024-8/16/2024    Therapist: Bobbi Correa PTA, CI, MS  7/24/2024

## 2024-07-26 ENCOUNTER — CLINICAL SUPPORT (OUTPATIENT)
Dept: REHABILITATION | Facility: HOSPITAL | Age: 64
End: 2024-07-26
Payer: MEDICAID

## 2024-07-26 DIAGNOSIS — G81.91 RIGHT HEMIPARESIS: Primary | ICD-10-CM

## 2024-07-26 DIAGNOSIS — R26.81 GAIT INSTABILITY: Chronic | ICD-10-CM

## 2024-07-26 PROCEDURE — 97530 THERAPEUTIC ACTIVITIES: CPT

## 2024-07-26 PROCEDURE — 97110 THERAPEUTIC EXERCISES: CPT

## 2024-07-26 NOTE — PROGRESS NOTES
Physical Therapy Daily Note     Name: Lisa Hendricks  Clinic Number: [id number]  Diagnosis:     G81.91 (ICD-10-CM) - Right hemiparesis  R26.81 (ICD-10-CM) - Gait instability       Physician: Herbert Barbour, DO  Precautions: none  Visit #: 9 of 16  PTA Visit #: 2  Time In: 0900  Time Out: 1000  FOTO  Initial score: 25  6/18/2024  Current score: 51 7/24/2024  MDC 9 points         Subjective     Pt reports: 4/10 pain in L knee and hip after Omnicycle. Difficulty walking reported as being her primary deficit. Continued difficulty with STS transfers and bed mobility.     Pain Scale: Lisa rates pain on a scale of 0-10 to be 2, RLE KNEE  Objective     BLE AROM: grossly hypomobile  RLE AROM less than LLE but not quantified this visit  IR RLE 5 degrees, LLE 15 degrees  BLE hips externally rotated, RLE more than LLE    Lower Extremity Strength  Right LE   Left LE     Hip Flexion: 3/5 Hip Flexion: 4/5   Hip Abduction: 3-/5 Hip Abduction: 4/5   Hip IR: 3-/5 Hip IR: 4/5   Hip ER: 3-/5 Hip ER: 4/5   Hip Extension: 3-/5 Hip Extension: 4/5   Knee extension: 3/5 Knee extension: 4+/5   Knee flexion: 3-/5 Knee flexion: 4+/5   Ankle dorsiflexion: trace Ankle dorsiflexion:  4+/5   Ankle plantarflexion: trace Ankle plantarflexion: 4/5     30s STS: unable to perform STS without assistance, minimal assistance    Bed mobility:    Sit to supine minimal assistance   Supine to sit moderate assistance   Rolling moderate assistance   Scooting moderate assistance   Bridging unable to lift buttocks completely off of bed, unable to keep RLE from sliding forward, requires minimal assistance    Lisa received individual therapeutic exercises, activities, neuromuscular re-education to develop strength, endurance, ROM, flexibility, posture, and core stabilization for 30 minutes including:    All BOLD exercises performed today:     OmniCycle 15 minutes      Side stepping right and left with CGA, Min  A   Scooting on mat, right and left with Min A   Standing balance, 5 minutes   Diaphragmatic breathing and TA exercises  Single leg RLE bridging   Side lying assisted modified clam  Side lying assisted hip flexor stretch     Supine clams  RLE heel slides  Sit to stand  Bed mobility   Sit to supine   Supine to sit   Rolling   Scooting   Bridging    Mode/doffing (right) AFO.    Standing marching in place at TM 2x10  Standing weight shifting at edge of TM 2x10  LAQ 3x10  Seated RLE hip flexion with foot desensitizing exercise with towel    Seated clams red theraband 3x10  Seated adductor squeezes 3x10     Lisa received the following manual therapy techniques: stretching of RLE  hip external rotators and flexors     Gait Training - RW, 50 feet, side stepping with RW    Education provided re: Recommend tube sock to wear AFO.     Lisa verbalized good understanding of education provided.   No spiritual or educational barriers to learning provided    Assessment       Patient with continued difficulty performing bed mobility tasks and transfers. Ambulation with unsteady gait and step to gait pattern. Hip external rotators and abductor tightness noted, causing external rotation of B feet during ambulation. Continue working on hip ROM, strength, and functional mobility tasks.         This is a 64 y.o. female referred to outpatient physical therapy and presents with a medical diagnosis of   Encounter Diagnoses   Name Primary?    Cerebrovascular accident (CVA) due to thrombosis of left middle cerebral artery Yes    Right hemiparesis     and demonstrates limitations as described in the problem list. Pt prognosis is Good. Pt will continue to benefit from skilled outpatient physical therapy to address the deficits listed in the problem list, provide pt/family education and to maximize pt's level of independence in the home and community environment.     Goals as follows:  Medical necessity is demonstrated by the following  IMPAIRMENTS/PROBLEMS:  1. Impaired balance  2. Impaired coordination  3. Impaired strength  4. Decreased tolerance functional activities  5. Impaired postural control        Pt's spiritual, cultural and educational needs considered and pt agreeable to plan of care and goals as stated below:      Anticipated Barriers for physical therapy:      Short Term GOALS: 3 weeks. Pt agrees with goals set.  Patient demonstrates independence with home exercise program.   Patient demonstrates postural awareness with all activities.   Patient reports no falls with functional activities.      Long Term GOALS: 8  weeks. Pt agrees with goals set.  Patient demonstrates improvement in functional mobility as evidenced by improvement in FOTO to 33 or greater.   Patient demonstrates improvement in (right) ankle range of motion to impact mobility  Patient demonstrates improvement in (bilateral) LE strength by at least 1/2 grade or greater.   Patient demonstrates decreased gait deviations.   Patient demonstrates improvement in transfer technique.   Goals PRN.         Plan     Continue with established Plan of Care towards PT goals.  Outpatient physical therapy 2 times weekly to include: pt ed, hep, therapeutic exercises, neuromuscular re-education/ balance exercises, manual therapy and modalities prn. Cont PT for  8 weeks. Pt may be seen by PTA as part of the rehabilitation team.   Certification dates: 6/18/2024-8/16/2024    Therapist: Slivio Conner, PT, DPT  7/26/2024

## 2024-07-26 NOTE — PROGRESS NOTES
Occupational Therapy Daily Treatment Note     Date: 7/26/2024  Name: Lisa Hendricks  MRN: [id number]    Diagnosis:        Encounter Diagnosis   Name Primary?    Right hemiparesis        Referring Physician: Herbert Barbour DO    Evaluation Date: 6/18/2024  Plan of Care Certification Period: 6/18/2024 - 8/16/2024    Last Reassessment: 6/18/2024    Visit # / Visits authorized: 8/ 16  BUSTAMANTE visit number: 0  Time In: 10:00 AM  Time Out: 10:59 AM  Total Billable Time: 59 minutes    Precautions:  Fall and cancer      Subjective     Pt reports: Im ok.  she was compliant with home exercise program given last session.     Pain: 0/10  Location: right shoulder      Objective     Treatment consist of the following:     Supervised modalities / MHP after being cleared for contradictions for 0 minutes:     Lisa tolerated the following manual therapy:  -Passive range of motion  x 5 right side              -flexion  -abduction (pain)  -IR (pain)  -ER (pain)  -elbow flexion/extension  -wrist flexion/extension  -radial/ulnar deviation  -supination/pronation  -Digit flexion/extension (DIP, PIP, and MCP)    Lisa participated in dynamic functional therapeutic activities to improve functional performance, including:  -Stimulated dressing task: Using reacher and dressing stick to  and place cones.     -Right hand only large pegs placing 16 pegs in about 15 min     -Pt performed clothespin ax seated on incline slope to increase intrinsic and extrinsic muscles of the hand, dexterity skills, and pinches such as lateral, tripod, and pincer to increase indep with all tasks.       - Red Digiweb 3x10 duck bill     -picking up and placing foam cubes in container x 2 trials    -red Flexbar  -wrist flexion/extension 2 x 15 (towel twist)  - radial and ulnar deviation 2 x 15 (door knob twist)  - supination 2 x 15 (frowns)  - pronation 2 x 15 (smiles)      Home Exercises and Education Provided     Education provided:   - Role of OT and OT  POC  - Progress towards goals     Written Home Exercises Provided: Patient instructed to cont prior HEP.  Exercises were reviewed and Lisa was able to demonstrate them prior to the end of the session.  Lisa demonstrated good  understanding of the HEP provided.   .   See EMR under  Evaluation note  for exercises provided .        Assessment     Pt would continue to benefit from skilled occupational therapy to address remaining deficits. Pt tolerated activities well this day with no complaints of increased pain. Patient continues to report pain with range of motion and decreased active range of motion/passive range of motion. Patient able to complete all activity this day with increased time due to difficulty of tasks. Pt required increased time during gripper activity due to difficulty of task. Continue occupational therapy per plan of care. Pt noted with soft end ranges in all planes during passive range of motion. Occupational therapist /BUSTAMANTE collaboration to discuss POC, improvements, and d/c planning on todays date.      Lisa is progressing well towards her goals and there are no updates to goals at this time. Pt prognosis is Fair.     Pt will continue to benefit from skilled OT intervention.    Patient continues to demonstrate limitation  with  ROM, Joint mobility, Stiffness, Decreased fine motor coordination, Decreased functional use, Decreased strength, and Continued pain     Goals:  STG to be completed in:  Time frame: 4 weeks  1) Pt will be able to complete  testing with RUE  2) Pt will be independent in preforming HEP.  3) Pt will use modalities at home for pain management.  4) Pt will increase shoulder passive range of motion to WNL to decrease stiffness and to promote increased ROM        LTG to be completed in:  Time frame: 8 weeks  1) Pt will report an increase in independence in activities of daily living   2) Pt will be independent in preforming HEP.  3) Pt will report an increase in ability to  complete bathing to MOD I       Plan     Patient/caregiver understands and agrees with plan of care.     Outpatient occupational therapy 2  times weekly for 8 weeks  to include: pt ed, hep, therapeutic exercises, therapeutic activity, ADLs,  neuromuscular re-education, joint mobilizations, modalities prn,      Certification Dates: 6/18/2024 - 8/16/2024  Updates/Grading for next session: N/A      Logan Dwyer OT    Occupational therapist-[facility] POC MEETING  [x] I certify that face-to-face meeting with [facility] regarding this patient's future POC occurred. Occupational therapist/occupational therapy Assistant met face to face to discuss pt's treatment plan and progress towards established goals. Pt will be seen by a occupational therapist minimally every 6th visit or every 30 days.    OT: Logan Dwyer OTR/L  Date: 7/12/2024    [x] I certify that face-to-face meeting with OT regarding this patient's future POC occurred. Occupational therapist/occupational therapy Assistant met face to face to discuss pt's treatment plan and progress towards established goals. Pt will be seen by a occupational therapist minimally every 6th visit or every 30 days.    [facility]: Logan Dwyer OT  Date: 7/12/24

## 2024-08-02 ENCOUNTER — CLINICAL SUPPORT (OUTPATIENT)
Dept: REHABILITATION | Facility: HOSPITAL | Age: 64
End: 2024-08-02
Payer: MEDICAID

## 2024-08-02 DIAGNOSIS — I63.312 CEREBROVASCULAR ACCIDENT (CVA) DUE TO THROMBOSIS OF LEFT MIDDLE CEREBRAL ARTERY: Primary | ICD-10-CM

## 2024-08-02 DIAGNOSIS — G81.91 RIGHT HEMIPARESIS: Primary | ICD-10-CM

## 2024-08-02 PROCEDURE — 97110 THERAPEUTIC EXERCISES: CPT | Mod: CQ

## 2024-08-02 PROCEDURE — 97530 THERAPEUTIC ACTIVITIES: CPT

## 2024-08-02 NOTE — PROGRESS NOTES
Occupational Therapy Daily Treatment / Progress Note     Date: 8/2/2024  Name: Lisa Hendricks  MRN: [id number]    Diagnosis:        Encounter Diagnosis   Name Primary?    Right hemiparesis        Referring Physician: Herbert Barbour DO    Evaluation Date: 6/18/2024  Plan of Care Certification Period: 6/18/2024 - 8/16/2024    Last Reassessment: 6/18/2024    Visit # / Visits authorized: 10/ 16  BUSTAMANTE visit number: 0  Time In: 10:00 AM  Time Out: 10:59 AM  Total Billable Time: 59 minutes    Precautions:  Fall and cancer      Subjective     Pt reports: Im ok.  she was compliant with home exercise program given last session.     Pain: 0/10  Location: right shoulder      Objective        Joint Evaluation PROM     Left   Shoulder flex 0-180  65 95   Shoulder Abd 0-180  70 85   Shoulder ER 0-90  30 55   Shoulder IR 0-90  60 70   Shoulder Extension 0-80  40 40       Strength:  Right: 1.6 lbs  Left: 45.2 lbs 45.8 lbs     Fine motor coordination:  9 Hole peg test:  Right: 17 sec 17 sec  Left: 55 sec for 3 pegs    FOTO: 43    Treatment consist of the following:      Lisa tolerated the following manual therapy:  -Passive range of motion  x 5 right side              -flexion  -abduction (pain)  -IR (pain)  -ER (pain)  -elbow flexion/extension  -wrist flexion/extension  -radial/ulnar deviation  -supination/pronation  -Digit flexion/extension (DIP, PIP, and MCP)    Lisa participated in dynamic functional therapeutic activities to improve functional performance, including:  -Stimulated dressing task: Using reacher and dressing stick to  and place cones.     -Right hand only large pegs placing 16 pegs in about 15 min     -Pt performed clothespin ax seated on incline slope to increase intrinsic and extrinsic muscles of the hand, dexterity skills, and pinches such as lateral, tripod, and pincer to increase indep with all tasks.       - Red Digiweb 3x10 duck bill     -picking up and placing foam cubes in container x 2  trials    -red Flexbar  -wrist flexion/extension 2 x 15 (towel twist)  - radial and ulnar deviation 2 x 15 (door knob twist)  - supination 2 x 15 (frowns)  - pronation 2 x 15 (smiles)      Home Exercises and Education Provided     Education provided:   - Role of OT and OT POC  - Progress towards goals     Written Home Exercises Provided: Patient instructed to cont prior HEP.  Exercises were reviewed and Lisa was able to demonstrate them prior to the end of the session.  Lisa demonstrated good  understanding of the HEP provided.   .   See EMR under  Evaluation note  for exercises provided .        Assessment     Pt would continue to benefit from skilled occupational therapy to address remaining deficits. Pt tolerated activities well this day with no complaints of increased pain. Patient continues to report pain with range of motion and decreased active range of motion/passive range of motion. Patient able to complete all activity this day with increased time due to difficulty of tasks. Patient objective measurements taken and goals updated below. Patient is progressing towords LTG. Continue occupational therapy per plan of care. Pt noted with soft end ranges in all planes during passive range of motion. Occupational therapist /BUSTAMANTE collaboration to discuss POC, improvements, and d/c planning on todays date.      Lisa is progressing well towards her goals and there are no updates to goals at this time. Pt prognosis is Fair.     Pt will continue to benefit from skilled OT intervention.    Patient continues to demonstrate limitation  with  ROM, Joint mobility, Stiffness, Decreased fine motor coordination, Decreased functional use, Decreased strength, and Continued pain     Goals:  STG to be completed in:  Time frame: 4 weeks  1) Pt will be able to complete  testing with RUE MET  2) Pt will be independent in preforming HEP. MET  3) Pt will use modalities at home for pain management.  MET  4) Pt will increase shoulder  passive range of motion to WNL to decrease stiffness and to promote increased range of motion PROGRESSING        LTG to be completed in:  Time frame: 8 weeks  1) Pt will report an increase in independence in activities of daily living PROGRESSING  2) Pt will be independent in preforming HEP. PROGRESSING  3) Pt will report an increase in ability to complete bathing to MOD I  PROGRESSING       Plan     Patient/caregiver understands and agrees with plan of care.     Outpatient occupational therapy 2  times weekly for 8 weeks  to include: pt ed, hep, therapeutic exercises, therapeutic activity, ADLs,  neuromuscular re-education, joint mobilizations, modalities prn,      Certification Dates: 6/18/2024 - 8/16/2024  Updates/Grading for next session: N/A      Logan Dwyer OT    Occupational therapist-[facility] POC MEETING  [x] I certify that face-to-face meeting with [facility] regarding this patient's future POC occurred. Occupational therapist/occupational therapy Assistant met face to face to discuss pt's treatment plan and progress towards established goals. Pt will be seen by a occupational therapist minimally every 6th visit or every 30 days.    OT: Logan Dwyer OTR/L  Date: 7/12/2024    [x] I certify that face-to-face meeting with OT regarding this patient's future POC occurred. Occupational therapist/occupational therapy Assistant met face to face to discuss pt's treatment plan and progress towards established goals. Pt will be seen by a occupational therapist minimally every 6th visit or every 30 days.    [facility]: Logan Dwyer OT  Date: 7/12/24

## 2024-08-02 NOTE — PROGRESS NOTES
Physical Therapy Daily Note     Name: Lisa Hendricks  Clinic Number: [id number]  Diagnosis:     G81.91 (ICD-10-CM) - Right hemiparesis  R26.81 (ICD-10-CM) - Gait instability       Physician: Herbert Barbour,   Precautions: none  Visit #: 11 of 16  PTA Visit #: 2  Time In: 0900  Time Out: 1000  FOTO  Initial score: 25  6/18/2024  Current score: 51 7/24/2024  MDC 9 points         Subjective     Pt reports: feeling good overall. Still active at home, walking. Not using AFO yet. Wearing new knee socks today to help with AFO usage.   Pain Scale: Lisa rates pain on a scale of 0-10 to be   Objective     30s STS: unable to perform STS without assistance, minimal assistance    Bed mobility:    Sit to supine minimal assistance   Supine to sit moderate assistance   Rolling moderate assistance   Scooting moderate assistance   Bridging unable to lift buttocks completely off of bed, unable to keep RLE from sliding forward, requires minimal assistance    Lisa received individual therapeutic exercises, activities, neuromuscular re-education to develop strength, endurance, ROM, flexibility, posture, and core stabilization for 30 minutes including:    All BOLD exercises performed today:     OmniCycle 15 minutes      Side stepping right and left with CGA, Min A   Scooting on mat, right and left with Min A   Standing balance, 5 minutes   Diaphragmatic breathing and TA exercises  Single leg RLE bridging - with assistance NMR  Side lying assisted modified clam  Side lying assisted hip flexor stretch     Supine clams  RLE heel slides  30 xSAQ with isometric quad sets   Sit to stand  Bed mobility   Sit to supine = assistance    Supine to sit - assistance   Rolling   Scooting - assistance    Bridging    Mode/doffing (right) AFO.    Standing marching in place at TM 2x10  Standing weight shifting at edge of TM 2x10      LAQ 3x10  Seated RLE hip flexion with foot desensitizing exercise  with towel    Seated clams red theraband 3x10  Seated adductor squeezes 3x10     Lisa received the following manual therapy techniques: stretching of RLE  hip external rotators and flexors ; contract and relax hip flexion to knee extension blocking hip abduction and ER.     Gait Training - RW, 50 feet, side stepping with RW    Education provided re: Recommend wearing AFO.     Lisa verbalized good understanding of education provided.   No spiritual or educational barriers to learning provided    Assessment       Patient has continued difficulty performing bed mobility tasks and transfers. Pt uses a lift chair at home. Encouraged pt to do more for herself. Poor qlute activation BLE. Emphasis today on RLE HIP , LE  ROM and strength. DON AFO, min A. Ambulation continues to resemble step to gait pattern with out toeing and RLE HIP ER. AFO fitting appears to be effective. Education for home ambulation and AFO usage.         This is a 64 y.o. female referred to outpatient physical therapy and presents with a medical diagnosis of   Encounter Diagnoses   Name Primary?    Cerebrovascular accident (CVA) due to thrombosis of left middle cerebral artery Yes    Right hemiparesis     and demonstrates limitations as described in the problem list. Pt prognosis is Good. Pt will continue to benefit from skilled outpatient physical therapy to address the deficits listed in the problem list, provide pt/family education and to maximize pt's level of independence in the home and community environment.     Goals as follows:  Medical necessity is demonstrated by the following IMPAIRMENTS/PROBLEMS:  1. Impaired balance  2. Impaired coordination  3. Impaired strength  4. Decreased tolerance functional activities  5. Impaired postural control        Pt's spiritual, cultural and educational needs considered and pt agreeable to plan of care and goals as stated below:      Anticipated Barriers for physical therapy:      Short Term GOALS: 3 weeks.  Pt agrees with goals set.  Patient demonstrates independence with home exercise program.   Patient demonstrates postural awareness with all activities.   Patient reports no falls with functional activities.      Long Term GOALS: 8  weeks. Pt agrees with goals set.  Patient demonstrates improvement in functional mobility as evidenced by improvement in FOTO to 33 or greater.  (Met 8/2/2024)  Patient demonstrates improvement in (right) ankle range of motion to impact mobility) - progessing 8/2/2024  Patient demonstrates improvement in (bilateral) LE strength by at least 1/2 grade or greater.   Patient demonstrates decreased gait deviations.   Patient demonstrates improvement in transfer technique.   Goals PRN.         Plan     Continue with established Plan of Care towards PT goals.  Outpatient physical therapy 2 times weekly to include: pt ed, hep, therapeutic exercises, neuromuscular re-education/ balance exercises, manual therapy and modalities prn. Cont PT for  8 weeks. Pt may be seen by PTA as part of the rehabilitation team.   Certification dates: 6/18/2024-8/16/2024    Therapist: Bobbi Correa PTA, CI, MS  8/2/2024

## 2024-08-07 ENCOUNTER — CLINICAL SUPPORT (OUTPATIENT)
Dept: REHABILITATION | Facility: HOSPITAL | Age: 64
End: 2024-08-07
Payer: MEDICAID

## 2024-08-07 DIAGNOSIS — G81.91 RIGHT HEMIPARESIS: Primary | ICD-10-CM

## 2024-08-07 DIAGNOSIS — I63.312 CEREBROVASCULAR ACCIDENT (CVA) DUE TO THROMBOSIS OF LEFT MIDDLE CEREBRAL ARTERY: Primary | ICD-10-CM

## 2024-08-07 PROCEDURE — 97110 THERAPEUTIC EXERCISES: CPT | Mod: CQ

## 2024-08-07 PROCEDURE — 97530 THERAPEUTIC ACTIVITIES: CPT

## 2024-08-09 ENCOUNTER — CLINICAL SUPPORT (OUTPATIENT)
Dept: REHABILITATION | Facility: HOSPITAL | Age: 64
End: 2024-08-09
Payer: MEDICAID

## 2024-08-09 DIAGNOSIS — E66.9 DIABETES MELLITUS TYPE 2 IN OBESE: ICD-10-CM

## 2024-08-09 DIAGNOSIS — G81.91 RIGHT HEMIPARESIS: Primary | ICD-10-CM

## 2024-08-09 DIAGNOSIS — E11.69 DIABETES MELLITUS TYPE 2 IN OBESE: ICD-10-CM

## 2024-08-09 DIAGNOSIS — R26.81 GAIT INSTABILITY: ICD-10-CM

## 2024-08-09 DIAGNOSIS — I63.312 CEREBROVASCULAR ACCIDENT (CVA) DUE TO THROMBOSIS OF LEFT MIDDLE CEREBRAL ARTERY: ICD-10-CM

## 2024-08-09 PROCEDURE — 97110 THERAPEUTIC EXERCISES: CPT

## 2024-08-09 PROCEDURE — 97530 THERAPEUTIC ACTIVITIES: CPT

## 2024-08-09 RX ORDER — INSULIN ASPART 100 [IU]/ML
30 INJECTION, SOLUTION INTRAVENOUS; SUBCUTANEOUS
Qty: 9 ML | Refills: 2 | Status: SHIPPED | OUTPATIENT
Start: 2024-08-09 | End: 2024-11-07

## 2024-08-13 ENCOUNTER — PATIENT MESSAGE (OUTPATIENT)
Dept: ADMINISTRATIVE | Facility: HOSPITAL | Age: 64
End: 2024-08-13
Payer: MEDICAID

## 2024-08-14 ENCOUNTER — CLINICAL SUPPORT (OUTPATIENT)
Dept: REHABILITATION | Facility: HOSPITAL | Age: 64
End: 2024-08-14
Payer: MEDICAID

## 2024-08-14 DIAGNOSIS — I63.312 CEREBROVASCULAR ACCIDENT (CVA) DUE TO THROMBOSIS OF LEFT MIDDLE CEREBRAL ARTERY: Primary | ICD-10-CM

## 2024-08-14 DIAGNOSIS — E11.9 TYPE 2 DIABETES MELLITUS WITHOUT COMPLICATION: ICD-10-CM

## 2024-08-14 DIAGNOSIS — G81.91 RIGHT HEMIPARESIS: Primary | ICD-10-CM

## 2024-08-14 DIAGNOSIS — G81.91 RIGHT HEMIPARESIS: ICD-10-CM

## 2024-08-14 DIAGNOSIS — R26.81 GAIT INSTABILITY: Chronic | ICD-10-CM

## 2024-08-14 PROCEDURE — 97530 THERAPEUTIC ACTIVITIES: CPT

## 2024-08-14 PROCEDURE — 97110 THERAPEUTIC EXERCISES: CPT

## 2024-08-14 NOTE — PROGRESS NOTES
Physical Therapy Daily Note     Name: Lisa Hendricks  Clinic Number: [id number]  Diagnosis:     G81.91 (ICD-10-CM) - Right hemiparesis  R26.81 (ICD-10-CM) - Gait instability       Physician: Herbert Barbour, DO  Precautions: none  Visit #: 14 of 16  PTA Visit #: 0  Time In: 0900  Time Out: 0955  FOTO  Initial score: 25  6/18/2024  Current score: 51 7/24/2024, 50 8/9/24  MDC 9 points         Subjective     Pt reports: she was thinking about taking a break from Physical therapy but has decided to continue with physical Therapy intervention.   No discomfort reported at this time.   Pain Scale: Lisa rates pain on a scale of 0-10 to be 0.     Objective       Lisa received individual therapeutic exercises, activities, neuromuscular re-education to develop strength, endurance, ROM, flexibility, posture, and core stabilization for 40 minutes including:    All BOLD exercises performed today:     AFO today;   Side stepping right and left with CGA, Min A   Scooting on mat, right and left with Min A   Standing balance, 5 minutes   Diaphragmatic breathing and TA exercises  Weight shifts - RLE, reaching to Right 3 sets 15 reps  Seated without back support throughout session.   Seated RLE hip flexion to fatigue   Seated RLE hip adduction to fatigue  Seated LAQ to fatigue     Single leg RLE bridging - with assistance NMR  Side lying assisted modified clam  Side lying assisted hip flexor stretch     Supine clams  RLE heel slides  30 xSAQ with isometric quad sets     Bed mobility   Sit to supine = assistance    Supine to sit - assistance   Rolling   Scooting - assistance    Bridging    Standing marching in place at TM 3x10  Standing weight shifting at edge of TM 3x10  Standing ball kick 2x10 reps      Lisa received the following manual therapy techniques: stretching of RLE  hip external rotators and flexors ; contract and relax hip flexion to knee extension blocking hip abduction  and ER.     Gait Training - RW, 100 ft with decreased speed of gait    Education provided re: standing more often at home. Sitting in regular chair    Lisa verbalized good understanding of education provided.   No spiritual or educational barriers to learning provided    Assessment     Patient tolerated treatment well. Patient with cueing needed to weight shifting to (right) LE. Patient with external rotation of (right) LE but able to correct some with focusing on movement. Continue with current POC.       This is a 64 y.o. female referred to outpatient physical therapy and presents with a medical diagnosis of   Encounter Diagnoses   Name Primary?    Cerebrovascular accident (CVA) due to thrombosis of left middle cerebral artery Yes    Right hemiparesis     and demonstrates limitations as described in the problem list. Pt prognosis is Good. Pt will continue to benefit from skilled outpatient physical therapy to address the deficits listed in the problem list, provide pt/family education and to maximize pt's level of independence in the home and community environment.     Goals as follows:  Medical necessity is demonstrated by the following IMPAIRMENTS/PROBLEMS:  1. Impaired balance  2. Impaired coordination  3. Impaired strength  4. Decreased tolerance functional activities  5. Impaired postural control        Pt's spiritual, cultural and educational needs considered and pt agreeable to plan of care and goals as stated below:      Anticipated Barriers for physical therapy:      Short Term GOALS: 3 weeks. Pt agrees with goals set.  Patient demonstrates independence with home exercise program. -MET  Patient demonstrates postural awareness with all activities. Progressing  Patient reports no falls with functional activities. - Progressing     Long Term GOALS: 8  weeks. Pt agrees with goals set.  Patient demonstrates improvement in functional mobility as evidenced by improvement in FOTO to 33 or greater.  (Met  8/2/2024)  Patient demonstrates improvement in functional mobility as evidenced by improvement in FOTO to 60 or greater.  Patient demonstrates improvement in (right) ankle range of motion to impact mobility) - progessing 8/2/2024  Patient demonstrates improvement in (bilateral) LE strength by at least 1/2 grade or greater.   Patient demonstrates decreased gait deviations. Progressing   Patient demonstrates improvement in transfer technique. - MET  Goals PRN.         Plan     Continue with established Plan of Care towards PT goals.  Outpatient physical therapy 2 times weekly to include: pt ed, hep, therapeutic exercises, neuromuscular re-education/ balance exercises, manual therapy and modalities prn. Cont PT for  6 weeks. Pt may be seen by PTA as part of the rehabilitation team.   Certification dates: 6/18/2024-8/16/2024  NEW POC : 8/16/24-9/27/24    Therapist: Pricila Dwyer, PT, MPT 8/14/2024

## 2024-08-14 NOTE — PROGRESS NOTES
Occupational Therapy Daily Treatment Note     Date: 8/14/2024  Name: Lisa Hendricks  MRN: [id number]    Diagnosis:        Encounter Diagnosis   Name Primary?    Right hemiparesis        Referring Physician: Herbert Barbour DO    Evaluation Date: 6/18/2024  Plan of Care Certification Period: 6/18/2024 - 8/16/2024    Last Reassessment: 8/2/2024    Visit # / Visits authorized: 14/ 16  BUSTAMANTE visit number: 0  Time In: 9:53 AM  Time Out: 10:48 AM  Total Billable Time: 55 minutes    Precautions:  Fall and cancer      Subjective     Pt reports: Im ok.  she was compliant with home exercise program given last session.     Pain: 0/10  Location: right shoulder      Objective      Treatment consist of the following:      Lisa tolerated the following manual therapy:  -Passive range of motion  x 5 right side              -flexion  -abduction (pain)  -IR (pain)  -ER (pain)  -elbow flexion/extension  -wrist flexion/extension  -radial/ulnar deviation  -supination/pronation  -Digit flexion/extension (DIP, PIP, and MCP)    Lisa participated in dynamic functional therapeutic activities to improve functional performance, including:  -Stimulated dressing task: Using reacher and dressing stick to  and place cones.     -Bilateral hand use while standing large peg board     -Pt performed clothespin ax seated on incline slope to increase intrinsic and extrinsic muscles of the hand, dexterity skills, and pinches such as lateral, tripod, and pincer to increase indep with all tasks.       - Red Digiweb 3x10 duck bill     -picking up and placing small objects in color coordinated container wile using bilateral hands     -red Flexbar  -wrist flexion/extension 2 x 15 (towel twist)  - radial and ulnar deviation 2 x 15 (door knob twist)  - supination 2 x 15 (frowns)  - pronation 2 x 15 (smiles)      Home Exercises and Education Provided     Education provided:   - Role of OT and OT POC  - Progress towards goals     Written Home Exercises  Provided: Patient instructed to cont prior HEP.  Exercises were reviewed and Lisa was able to demonstrate them prior to the end of the session.  Lisa demonstrated good  understanding of the HEP provided.   .   See EMR under  Evaluation note  for exercises provided .        Assessment     Pt would continue to benefit from skilled occupational therapy to address remaining deficits. Pt tolerated all activities well this day with no complaints of increased pain. Patient able to complete all activity this day with decreased time. Patient stated that she would like to finish her current OT POC and take a break from therapy once finished. Pt to be reassessed at next visit and discharged per patient request. Occupational therapist /BUSTAMANTE collaboration to discuss POC, improvements, and d/c planning on todays date.      Lisa is progressing well towards her goals and there are no updates to goals at this time. Pt prognosis is Fair.     Pt will continue to benefit from skilled OT intervention.    Patient continues to demonstrate limitation  with  ROM, Joint mobility, Stiffness, Decreased fine motor coordination, Decreased functional use, Decreased strength, and Continued pain     Goals:  STG to be completed in:  Time frame: 4 weeks  1) Pt will be able to complete  testing with RUE MET  2) Pt will be independent in preforming HEP. MET  3) Pt will use modalities at home for pain management.  MET  4) Pt will increase shoulder passive range of motion to WNL to decrease stiffness and to promote increased range of motion PROGRESSING        LTG to be completed in:  Time frame: 8 weeks  1) Pt will report an increase in independence in activities of daily living PROGRESSING  2) Pt will be independent in preforming HEP. PROGRESSING  3) Pt will report an increase in ability to complete bathing to MOD I  PROGRESSING       Plan     Patient/caregiver understands and agrees with plan of care.     Outpatient occupational therapy 2  times  weekly for 8 weeks  to include: pt ed, hep, therapeutic exercises, therapeutic activity, ADLs,  neuromuscular re-education, joint mobilizations, modalities prn,      Certification Dates: 6/18/2024 - 8/16/2024    Updates/Grading for next session: N/A      Logan Dwyer OT    Occupational therapist-[facility] POC MEETING  [x] I certify that face-to-face meeting with [facility] regarding this patient's future POC occurred. Occupational therapist/occupational therapy Assistant met face to face to discuss pt's treatment plan and progress towards established goals. Pt will be seen by a occupational therapist minimally every 6th visit or every 30 days.    OT: Logan Dwyer, OTR/L  Date: 7/12/2024    [x] I certify that face-to-face meeting with OT regarding this patient's future POC occurred. Occupational therapist/occupational therapy Assistant met face to face to discuss pt's treatment plan and progress towards established goals. Pt will be seen by a occupational therapist minimally every 6th visit or every 30 days.    [facility]: Logan Dwyer OT  Date: 7/12/24

## 2024-08-16 ENCOUNTER — CLINICAL SUPPORT (OUTPATIENT)
Dept: REHABILITATION | Facility: HOSPITAL | Age: 64
End: 2024-08-16
Payer: MEDICAID

## 2024-08-16 DIAGNOSIS — G81.91 RIGHT HEMIPARESIS: Primary | ICD-10-CM

## 2024-08-16 DIAGNOSIS — R26.81 GAIT INSTABILITY: ICD-10-CM

## 2024-08-16 DIAGNOSIS — I63.312 CEREBROVASCULAR ACCIDENT (CVA) DUE TO THROMBOSIS OF LEFT MIDDLE CEREBRAL ARTERY: ICD-10-CM

## 2024-08-16 PROCEDURE — 97110 THERAPEUTIC EXERCISES: CPT

## 2024-08-16 PROCEDURE — 97530 THERAPEUTIC ACTIVITIES: CPT

## 2024-08-16 NOTE — PROGRESS NOTES
Physical Therapy Daily Note     Name: Lisa Hendricks  Clinic Number: [id number]  Diagnosis:     G81.91 (ICD-10-CM) - Right hemiparesis  R26.81 (ICD-10-CM) - Gait instability       Physician: Herbert Barbour, DO  Precautions: none  Visit #: 16 of 16  PTA Visit #: 0  Time In: 1000  Time Out: 1100  FOTO  Initial score: 25  6/18/2024  Current score: 51 7/24/2024, 50 8/9/24  MDC 9 points         Subjective     Pt reports: she is feeling some soreness this visit but no pain   Pain Scale: Lisa rates pain on a scale of 0-10 to be 0.     Objective       Lisa received individual therapeutic exercises, activities, neuromuscular re-education to develop strength, endurance, ROM, flexibility, posture, and core stabilization for 40 minutes including:    All BOLD exercises performed today:     AFO today;   Side stepping right and left with CGA, Min A   Scooting on mat, right and left with Min A   Standing balance, 5 minutes   Diaphragmatic breathing and TA exercises  Weight shifts - RLE, reaching to Right 3 sets 15 reps  Seated without back support throughout session.   Seated RLE hip flexion to fatigue   Seated RLE hip adduction to fatigue  Seated LAQ to fatigue     Single leg RLE bridging - with assistance NMR  Side lying assisted modified clam  Side lying assisted hip flexor stretch     Supine clams  RLE heel slides  30 xSAQ with isometric quad sets     Bed mobility   Sit to supine = assistance    Supine to sit - assistance   Rolling   Scooting - assistance    Bridging    Standing marching in place at TM 3x10  Standing weight shifting at edge of TM 3x10  Standing ball kick 2x10 reps  Standing Squats at TM 3x10 reps      Lisa received the following manual therapy techniques: stretching of RLE  hip external rotators and flexors ; contract and relax hip flexion to knee extension blocking hip abduction and ER.     Gait Training - RW, 50 ft with decreased speed of  gait    Education provided re: standing more often at home. Sitting in regular chair    Lisa verbalized good understanding of education provided.   No spiritual or educational barriers to learning provided    Assessment     Patient is able to tolerate all treatment well this visit with minimal pain in her B knees being reported with standing. She is able to progress with squats this visit with cuing required for proper form. She is being progressed with dynamic stability this visit with good tolerance being noted. Fatigue is noted following squats this visit. She is able to perform toe taps with improved sequencing, but she continues to demonstrate deficits with knee flexion and hip flexion sequencing.       This is a 64 y.o. female referred to outpatient physical therapy and presents with a medical diagnosis of   Encounter Diagnoses   Name Primary?    Cerebrovascular accident (CVA) due to thrombosis of left middle cerebral artery Yes    Right hemiparesis     and demonstrates limitations as described in the problem list. Pt prognosis is Good. Pt will continue to benefit from skilled outpatient physical therapy to address the deficits listed in the problem list, provide pt/family education and to maximize pt's level of independence in the home and community environment.     Goals as follows:  Medical necessity is demonstrated by the following IMPAIRMENTS/PROBLEMS:  1. Impaired balance  2. Impaired coordination  3. Impaired strength  4. Decreased tolerance functional activities  5. Impaired postural control        Pt's spiritual, cultural and educational needs considered and pt agreeable to plan of care and goals as stated below:      Anticipated Barriers for physical therapy:      Short Term GOALS: 3 weeks. Pt agrees with goals set.  Patient demonstrates independence with home exercise program. -MET  Patient demonstrates postural awareness with all activities. Progressing  Patient reports no falls with functional  activities. - Progressing     Long Term GOALS: 8  weeks. Pt agrees with goals set.  Patient demonstrates improvement in functional mobility as evidenced by improvement in FOTO to 33 or greater.  (Met 8/2/2024)  Patient demonstrates improvement in functional mobility as evidenced by improvement in FOTO to 60 or greater.  Patient demonstrates improvement in (right) ankle range of motion to impact mobility) - progessing 8/2/2024  Patient demonstrates improvement in (bilateral) LE strength by at least 1/2 grade or greater.   Patient demonstrates decreased gait deviations. Progressing   Patient demonstrates improvement in transfer technique. - MET  Goals PRN.         Plan     Continue with established Plan of Care towards PT goals.  Outpatient physical therapy 2 times weekly to include: pt ed, hep, therapeutic exercises, neuromuscular re-education/ balance exercises, manual therapy and modalities prn. Cont PT for  6 weeks. Pt may be seen by PTA as part of the rehabilitation team.   Certification dates: 6/18/2024-8/16/2024  NEW POC : 8/16/24-9/27/24    Therapist: Carloz Medina, PT, DPT, MTC   8/16/2024

## 2024-08-16 NOTE — PROGRESS NOTES
Occupational Therapy Daily Treatment / Discharge Note     Date: 8/16/2024  Name: Lisa Hendricks  MRN: [id number]    Diagnosis:        Encounter Diagnosis   Name Primary?    Right hemiparesis        Referring Physician: Herbert Barbour DO    Evaluation Date: 6/18/2024  Plan of Care Certification Period: 6/18/2024 - 8/16/2024    Last Reassessment: 8/2/2024    Visit # / Visits authorized: 16/ 16  BUSTAMANTE visit number: 0  Time In: 9:00 AM  Time Out: 9:58 AM  Total Billable Time: 58 minutes    Precautions:  Fall and cancer      Subjective     Pt reports: Im ok.  she was compliant with home exercise program given last session.     Pain: 0/10  Location: right shoulder      Objective       Joint Evaluation PROM     Left   Shoulder flex 0-180  65 95   Shoulder Abd 0-180  70 85   Shoulder ER 0-90  30 55   Shoulder IR 0-90  60 70   Shoulder Extension 0-80  40 40       Strength:  Right: 1.6 lbs 3.8 lbs  Left: 45.2 lbs 45.8 lbs 65.0 lbs     Fine motor coordination:  9 Hole peg test:  Right: 55 sec for 3 pegs  45 sec for 3 pegs  Left: 17 sec 17 sec  15 sec     FOTO: 43 46     Treatment consist of the following:      Lisa tolerated the following manual therapy:  -Passive range of motion  x 5 right side              -flexion  -abduction (pain)  -IR (pain)  -ER (pain)  -elbow flexion/extension  -wrist flexion/extension  -radial/ulnar deviation  -supination/pronation  -Digit flexion/extension (DIP, PIP, and MCP)    Lisa participated in dynamic functional therapeutic activities to improve functional performance, including:  -Stimulated dressing task: Using reacher and dressing stick to  and place cones.     -Bilateral hand use while standing large peg board     -Pt performed clothespin ax seated on incline slope to increase intrinsic and extrinsic muscles of the hand, dexterity skills, and pinches such as lateral, tripod, and pincer to increase indep with all tasks.       - Red Digiweb 3x10 duck bill     -picking up and  placing small objects in color coordinated container wile using bilateral hands     -red Flexbar  -wrist flexion/extension 2 x 15 (towel twist)  - radial and ulnar deviation 2 x 15 (door knob twist)  - supination 2 x 15 (frowns)  - pronation 2 x 15 (smiles)      Home Exercises and Education Provided     Education provided:   - Role of OT and OT POC  - Progress towards goals     Written Home Exercises Provided: Patient instructed to cont prior HEP.  Exercises were reviewed and Lisa was able to demonstrate them prior to the end of the session.  Lisa demonstrated good  understanding of the HEP provided.   .   See EMR under  Evaluation note  for exercises provided .        Assessment     Pt tolerated all activities well this day with no complaints of increased pain. Patient able to complete all activity this day with decreased time. Patient stated that she would like to  take a break from therapy. Patient objective measurements taken and goals updated below. Patient to be discharged from skilled OT per patient request. Occupational therapist /BUSTAMANTE collaboration to discuss POC, improvements, and d/c planning on todays date.      Goals:  STG to be completed in:  Time frame: 4 weeks  1) Pt will be able to complete  testing with RUE MET  2) Pt will be independent in preforming HEP. MET  3) Pt will use modalities at home for pain management.  MET  4) Pt will increase shoulder passive range of motion to WNL to decrease stiffness and to promote increased range of motion Not Met        LTG to be completed in:  Time frame: 8 weeks  1) Pt will report an increase in independence in activities of daily living MET  2) Pt will be independent in preforming HEP. MET  3) Pt will report an increase in ability to complete bathing to MOD I  Not Met       Plan     Patient/caregiver understands and agrees with plan of care.     Patient to be discharged from outpatient occupational therapy per patient request.      Logan Dwyer  OT    Occupational therapist-[facility] POC MEETING  [x] I certify that face-to-face meeting with [facility] regarding this patient's future POC occurred. Occupational therapist/occupational therapy Assistant met face to face to discuss pt's treatment plan and progress towards established goals. Pt will be seen by a occupational therapist minimally every 6th visit or every 30 days.    OT: Logan Dwyer, OTR/L  Date: 7/12/2024    [x] I certify that face-to-face meeting with OT regarding this patient's future POC occurred. Occupational therapist/occupational therapy Assistant met face to face to discuss pt's treatment plan and progress towards established goals. Pt will be seen by a occupational therapist minimally every 6th visit or every 30 days.    [facility]: Logan Dwyer OT  Date: 7/12/24

## 2024-08-21 ENCOUNTER — CLINICAL SUPPORT (OUTPATIENT)
Dept: REHABILITATION | Facility: HOSPITAL | Age: 64
End: 2024-08-21
Payer: MEDICAID

## 2024-08-21 DIAGNOSIS — G81.91 RIGHT HEMIPARESIS: ICD-10-CM

## 2024-08-21 DIAGNOSIS — R26.81 GAIT INSTABILITY: ICD-10-CM

## 2024-08-21 DIAGNOSIS — I63.312 CEREBROVASCULAR ACCIDENT (CVA) DUE TO THROMBOSIS OF LEFT MIDDLE CEREBRAL ARTERY: Primary | ICD-10-CM

## 2024-08-21 PROCEDURE — 97110 THERAPEUTIC EXERCISES: CPT | Mod: CQ

## 2024-08-21 NOTE — PROGRESS NOTES
Physical Therapy Daily Note     Name: Lisa Hendricks  Clinic Number: [id number]  Diagnosis:     G81.91 (ICD-10-CM) - Right hemiparesis  R26.81 (ICD-10-CM) - Gait instability       Physician: Herbert Barbour, DO  Precautions: none  Visit #: 16 of 28  PTA Visit #: 1  Time In: 1000  Time Out: 1100  FOTO  Initial score: 25  6/18/2024  Current score: 51 7/24/2024, 50 8/9/24  MDC 9 points         Subjective     Pt reports: she is feeling good. Wants to work on ambulation. Shd feels she should not use the AFO due to diabetes.   Pain Scale: iLsa rates pain on a scale of 0-10 to be 0.     Objective       Lisa received individual therapeutic exercises, activities, neuromuscular re-education to develop strength, endurance, ROM, flexibility, posture, and core stabilization for 40 minutes including:    All BOLD exercises performed today:     Side stepping right and left with CGA, Min A   Scooting on mat, right and left with Min A   Single leg RLE bridging - with assistance NMR  Side lying assisted modified clam  Side lying assisted hip flexor stretch     Bed mobility   Sit to supine = assistance    Supine to sit - assistance   Rolling    Lisa received the following manual therapy techniques: 25 minutes - ROM of RLE  hip external rotators and flexors ; contract and relax hip flexion to knee extension blocking hip abduction and ER.     Gait Training - lindsay- walker gait training. , pre gait NMR  -and  25 ft with CGA    Education provided re: bridging in bed , using the chair lift less     Lisa verbalized good understanding of education provided.   No spiritual or educational barriers to learning provided    Assessment     Lisa is being progressed with dynamic stability this visit with good tolerance being noted. Gait training with lindsay - walker. (Recommend lindsay walker at home). Pt willing to train at home with a lindsay walker. She wants to become more independent with ambulation.  Pt given minimal contact cues and assistance for ambulation. Pt has weakness in core and RLE hip. Cues to correct walker an      This is a 64 y.o. female referred to outpatient physical therapy and presents with a medical diagnosis of   Encounter Diagnoses   Name Primary?    Cerebrovascular accident (CVA) due to thrombosis of left middle cerebral artery Yes    Right hemiparesis     and demonstrates limitations as described in the problem list. Pt prognosis is Good. Pt will continue to benefit from skilled outpatient physical therapy to address the deficits listed in the problem list, provide pt/family education and to maximize pt's level of independence in the home and community environment.     Goals as follows:  Medical necessity is demonstrated by the following IMPAIRMENTS/PROBLEMS:  1. Impaired balance  2. Impaired coordination  3. Impaired strength  4. Decreased tolerance functional activities  5. Impaired postural control        Pt's spiritual, cultural and educational needs considered and pt agreeable to plan of care and goals as stated below:      Anticipated Barriers for physical therapy:      Short Term GOALS: 3 weeks. Pt agrees with goals set.  Patient demonstrates independence with home exercise program. -MET  Patient demonstrates postural awareness with all activities. Progressing  Patient reports no falls with functional activities. - Progressing     Long Term GOALS: 8  weeks. Pt agrees with goals set.  Patient demonstrates improvement in functional mobility as evidenced by improvement in FOTO to 33 or greater.  (Met 8/2/2024)  Patient demonstrates improvement in functional mobility as evidenced by improvement in FOTO to 60 or greater.  Patient demonstrates improvement in (right) ankle range of motion to impact mobility) - progessing 8/2/2024  Patient demonstrates improvement in (bilateral) LE strength by at least 1/2 grade or greater.   Patient demonstrates decreased gait deviations. Progressing    Patient demonstrates improvement in transfer technique. - MET  Goals PRN.         Plan     Continue with established Plan of Care towards PT goals.  Outpatient physical therapy 2 times weekly to include: pt ed, hep, therapeutic exercises, neuromuscular re-education/ balance exercises, manual therapy and modalities prn. Cont PT for  6 weeks. Pt may be seen by PTA as part of the rehabilitation team.   Certification dates: 6/18/2024-8/16/2024  NEW POC : 8/16/24-9/27/24    Therapist: Bobbi Correa PTA, CI, MS   8/21/2024

## 2024-08-23 ENCOUNTER — CLINICAL SUPPORT (OUTPATIENT)
Dept: REHABILITATION | Facility: HOSPITAL | Age: 64
End: 2024-08-23
Payer: MEDICAID

## 2024-08-23 DIAGNOSIS — I63.312 CEREBROVASCULAR ACCIDENT (CVA) DUE TO THROMBOSIS OF LEFT MIDDLE CEREBRAL ARTERY: Primary | ICD-10-CM

## 2024-08-23 DIAGNOSIS — G81.91 RIGHT HEMIPARESIS: ICD-10-CM

## 2024-08-23 DIAGNOSIS — M51.36 DDD (DEGENERATIVE DISC DISEASE), LUMBAR: ICD-10-CM

## 2024-08-23 PROCEDURE — 97110 THERAPEUTIC EXERCISES: CPT | Mod: CQ

## 2024-08-23 NOTE — PROGRESS NOTES
Physical Therapy Daily Note     Name: Lisa Hendricks  Clinic Number: [id number]  Diagnosis:     G81.91 (ICD-10-CM) - Right hemiparesis  R26.81 (ICD-10-CM) - Gait instability       Physician: Herbert Barbour, DO  Precautions: none  Visit #: 15 of 28  PTA Visit #: 2  Time In: 1000  Time Out: 1100  FOTO  Initial score: 25  6/18/2024  Current score: 51 7/24/2024, 50 8/9/24  MDC 9 points         Subjective     Pt reports: her RLE knee hurts. She wants to be as independent as possible.   Pain Scale: Lisa rates pain on a scale of 0-10 to be 0.     Objective       Lisa received individual therapeutic exercises, activities, neuromuscular re-education to develop strength, endurance, ROM, flexibility, posture, and core stabilization for 30 minutes including:    All BOLD exercises performed today:     Single leg RLE bridging - with assistance NMR, 30x  SLR on red bolster 30x with assistance NMR  Side lying assisted modified clam  Side lying assisted hip flexor stretch   Sit [facility]5 [facility]     Bed mobility - CGA - MIN A    Sit to supine = assistance    Supine to sit - assistance   Rolling - needs assistance     Lisa received the following manual therapy techniques: 25 minutes - ROM of RLE  hip external rotators and flexors ; contract and relax hip flexion to knee extension blocking hip abduction and ER.     Gait Training - lindsay- walker gait training. , pre gait NMR  -and  25 ft with CGA, 5 feet x2 to chair and to mat.       Education provided re: bridging in bed , using the chair lift less     Lisa verbalized good understanding of education provided.   No spiritual or educational barriers to learning provided    Assessment     Lisa is being progressed with dynamic stability this visit with good tolerance being noted. Gait training with lindsay - walker. Pt purchased a personal  lindsay walker.  Pt required mod A for seated to supine transfer to training. Lisa had good  muscular strength and endurance for today's activities.   Pt given minimal contact cues and assistance for ambulation. Pt has weakness in core and RLE hip. Cues to correct walker an      This is a 64 y.o. female referred to outpatient physical therapy and presents with a medical diagnosis of   Encounter Diagnoses   Name Primary?    Cerebrovascular accident (CVA) due to thrombosis of left middle cerebral artery Yes    Right hemiparesis     and demonstrates limitations as described in the problem list. Pt prognosis is Good. Pt will continue to benefit from skilled outpatient physical therapy to address the deficits listed in the problem list, provide pt/family education and to maximize pt's level of independence in the home and community environment.     Goals as follows:  Medical necessity is demonstrated by the following IMPAIRMENTS/PROBLEMS:  1. Impaired balance  2. Impaired coordination  3. Impaired strength  4. Decreased tolerance functional activities  5. Impaired postural control        Pt's spiritual, cultural and educational needs considered and pt agreeable to plan of care and goals as stated below:      Anticipated Barriers for physical therapy:      Short Term GOALS: 3 weeks. Pt agrees with goals set.  Patient demonstrates independence with home exercise program. -MET  Patient demonstrates postural awareness with all activities. Progressing  Patient reports no falls with functional activities. - Progressing     Long Term GOALS: 8  weeks. Pt agrees with goals set.  Patient demonstrates improvement in functional mobility as evidenced by improvement in FOTO to 33 or greater.  (Met 8/2/2024)  Patient demonstrates improvement in functional mobility as evidenced by improvement in FOTO to 60 or greater.  Patient demonstrates improvement in (right) ankle range of motion to impact mobility) - progessing 8/2/2024  Patient demonstrates improvement in (bilateral) LE strength by at least 1/2 grade or greater.   Patient  demonstrates decreased gait deviations. Progressing   Patient demonstrates improvement in transfer technique. - MET  Goals PRN.         Plan     Continue with established Plan of Care towards PT goals.  Outpatient physical therapy 2 times weekly to include: pt ed, hep, therapeutic exercises, neuromuscular re-education/ balance exercises, manual therapy and modalities prn. Cont PT for  6 weeks. Pt may be seen by PTA as part of the rehabilitation team.   Certification dates: 6/18/2024-8/16/2024  NEW POC : 8/16/24-9/27/24    Therapist: Bobbi Correa PTA, CI, MS   8/23/2024

## 2024-08-28 ENCOUNTER — CLINICAL SUPPORT (OUTPATIENT)
Dept: REHABILITATION | Facility: HOSPITAL | Age: 64
End: 2024-08-28
Payer: MEDICAID

## 2024-08-28 DIAGNOSIS — I63.312 CEREBROVASCULAR ACCIDENT (CVA) DUE TO THROMBOSIS OF LEFT MIDDLE CEREBRAL ARTERY: Primary | ICD-10-CM

## 2024-08-28 PROCEDURE — 97110 THERAPEUTIC EXERCISES: CPT | Mod: CQ

## 2024-08-28 NOTE — PROGRESS NOTES
Physical Therapy Daily Note     Name: Lisa Hendricks  Clinic Number: [id number]  Diagnosis:     G81.91 (ICD-10-CM) - Right hemiparesis  R26.81 (ICD-10-CM) - Gait instability       Physician: Herbert Barbour, DO  Precautions: none  Visit #: 15 of 28  PTA Visit #: 3  Time In: 0900  Time Out: 1000  FOTO  Initial score: 25  6/18/2024  Current score: 51 7/24/2024, 50 8/9/24  MDC 9 points         Subjective     Pt reports: walked from house to car. No pain today.    Pain Scale: Lisa rates pain on a scale of 0-10 to be 0.     Objective       Lisa received individual therapeutic exercises, activities, neuromuscular re-education to develop strength, endurance, ROM, flexibility, posture, and core stabilization for 30 minutes including:    All BOLD exercises performed today:     Ambulation Circuit with hemiwalker  - 3 rounds     Obstacle training.   12 feet; to chairs 12 feet  5 feet to mat for 10x STS with good posture    Standing balance with RUE bent row - assisted.   Mat scooting with RLE assistance for WB       Lisa received the following manual therapy techniques: 10 minutes - ROM of RLE  hip external rotators and flexors ; contract and relax hip flexion to knee extension blocking hip abduction and ER.     Gait Training - 50 ft with CGA, with lindsay walker.       Education provided re: Strength on RLE, Standing every 1\2 hour at home.     Lisa verbalized good understanding of education provided.   No spiritual or educational barriers to learning provided    Assessment     Lisa is progressing with ambulation training with hemiwalker and obstacle, RLE strenghtening. Lisa had good endurance for today's activities. Pt LLE was fatigued at 50 feet and pt could not continue.   Pt given minimal contact cues and assistance for ambulation. Pt has weakness in core and RLE hip. Cues to correct walker RLE.       This is a 64 y.o. female referred to outpatient physical therapy and  presents with a medical diagnosis of   Encounter Diagnoses   Name Primary?    Cerebrovascular accident (CVA) due to thrombosis of left middle cerebral artery Yes    Right hemiparesis     and demonstrates limitations as described in the problem list. Pt prognosis is Good. Pt will continue to benefit from skilled outpatient physical therapy to address the deficits listed in the problem list, provide pt/family education and to maximize pt's level of independence in the home and community environment.     Goals as follows:  Medical necessity is demonstrated by the following IMPAIRMENTS/PROBLEMS:  1. Impaired balance  2. Impaired coordination  3. Impaired strength  4. Decreased tolerance functional activities  5. Impaired postural control        Pt's spiritual, cultural and educational needs considered and pt agreeable to plan of care and goals as stated below:      Anticipated Barriers for physical therapy:      Short Term GOALS: 3 weeks. Pt agrees with goals set.  Patient demonstrates independence with home exercise program. -MET  Patient demonstrates postural awareness with all activities. Progressing  Patient reports no falls with functional activities. - Progressing     Long Term GOALS: 8  weeks. Pt agrees with goals set.  Patient demonstrates improvement in functional mobility as evidenced by improvement in FOTO to 33 or greater.  (Met 8/2/2024)  Patient demonstrates improvement in functional mobility as evidenced by improvement in FOTO to 60 or greater.  Patient demonstrates improvement in (right) ankle range of motion to impact mobility) - progessing 8/2/2024  Patient demonstrates improvement in (bilateral) LE strength by at least 1/2 grade or greater.   Patient demonstrates decreased gait deviations. Progressing   Patient demonstrates improvement in transfer technique. - MET  Goals PRN.         Plan     Continue with established Plan of Care towards PT goals.  Outpatient physical therapy 2 times weekly to  include: pt ed, hep, therapeutic exercises, neuromuscular re-education/ balance exercises, manual therapy and modalities prn. Cont PT for  6 weeks. Pt may be seen by PTA as part of the rehabilitation team.   Certification dates: 6/18/2024-8/16/2024  NEW POC : 8/16/24-9/27/24    Therapist: Bobbi Correa, SHANNAN, CI, MS   8/28/2024

## 2024-09-04 ENCOUNTER — CLINICAL SUPPORT (OUTPATIENT)
Dept: REHABILITATION | Facility: HOSPITAL | Age: 64
End: 2024-09-04
Payer: MEDICAID

## 2024-09-04 DIAGNOSIS — I63.312 CEREBROVASCULAR ACCIDENT (CVA) DUE TO THROMBOSIS OF LEFT MIDDLE CEREBRAL ARTERY: Primary | ICD-10-CM

## 2024-09-04 PROCEDURE — 97110 THERAPEUTIC EXERCISES: CPT | Mod: CQ

## 2024-09-04 NOTE — PROGRESS NOTES
Physical Therapy Daily Note     Name: Lisa Hendricks  Clinic Number: [id number]  Diagnosis:     G81.91 (ICD-10-CM) - Right hemiparesis  R26.81 (ICD-10-CM) - Gait instability       Physician: Herbert Barbour, DO  Precautions: none  Visit #: 16 of 28  PTA Visit #: 4  Time In: 1000  Time Out: 1100  FOTO  Initial score: 25  6/18/2024  Current score: 51 7/24/2024, 50 8/9/24  MDC 9 points         Subjective     Pt reports: in wheelchair today. Doesn't like the new lindsay walker, too light.  Sore and achy in joints.   Pain Scale: Lisa rates pain on a scale of 0-10 to be 0.     Objective       Lisa received individual therapeutic exercises, activities, neuromuscular re-education to develop strength, endurance, ROM, flexibility, posture, and core stabilization for 30 minutes including:    All BOLD exercises performed today:     Ambulation Circuit with hemiwalker  - 3 rounds     Obstacle training.   12 feet; to chairs 12 feet  5 feet to mat for 10x STS with good posture    Standing balance with RUE bent row - assisted.   Mat scooting with RLE assistance for WB   Side Stepping with hemiwalke       Lisa received the following manual therapy techniques: 10 minutes - ROM of RLE  hip external rotators and flexors ; contract and relax hip flexion to knee extension blocking hip abduction and ER.     Gait Training - 50 ft with CGA, with lindsay walker.       Education provided re: Strength on RLE, Standing every 1\2 hour at home.     Lisa verbalized good understanding of education provided.   No spiritual or educational barriers to learning provided    Assessment     Continued progression in ambulation training with hemiwalker and obstacles, STS transfers and standing balance. Pt given minimal contact cues and assistance for ambulation. Pt has weakness in core and RLE hip. Cues to correct out toeing in RLE, weight shifting. Pt gets fatigued with standing and ambulation;       This is a  64 y.o. female referred to outpatient physical therapy and presents with a medical diagnosis of   Encounter Diagnoses   Name Primary?    Cerebrovascular accident (CVA) due to thrombosis of left middle cerebral artery Yes    Right hemiparesis     and demonstrates limitations as described in the problem list. Pt prognosis is Good. Pt will continue to benefit from skilled outpatient physical therapy to address the deficits listed in the problem list, provide pt/family education and to maximize pt's level of independence in the home and community environment.     Goals as follows:  Medical necessity is demonstrated by the following IMPAIRMENTS/PROBLEMS:  1. Impaired balance  2. Impaired coordination  3. Impaired strength  4. Decreased tolerance functional activities  5. Impaired postural control        Pt's spiritual, cultural and educational needs considered and pt agreeable to plan of care and goals as stated below:      Anticipated Barriers for physical therapy:      Short Term GOALS: 3 weeks. Pt agrees with goals set.  Patient demonstrates independence with home exercise program. -MET  Patient demonstrates postural awareness with all activities. Progressing  Patient reports no falls with functional activities. - Progressing     Long Term GOALS: 8  weeks. Pt agrees with goals set.  Patient demonstrates improvement in functional mobility as evidenced by improvement in FOTO to 33 or greater.  (Met 8/2/2024)  Patient demonstrates improvement in functional mobility as evidenced by improvement in FOTO to 60 or greater.  Patient demonstrates improvement in (right) ankle range of motion to impact mobility) - progessing 8/2/2024  Patient demonstrates improvement in (bilateral) LE strength by at least 1/2 grade or greater.   Patient demonstrates decreased gait deviations. Progressing   Patient demonstrates improvement in transfer technique. - MET  Goals PRN.         Plan     Continue with established Plan of Care towards PT  goals.  Outpatient physical therapy 2 times weekly to include: pt ed, hep, therapeutic exercises, neuromuscular re-education/ balance exercises, manual therapy and modalities prn. Cont PT for  6 weeks. Pt may be seen by PTA as part of the rehabilitation team.   Certification dates: 6/18/2024-8/16/2024  NEW POC : 8/16/24-9/27/24    Therapist: Bobbi Correa, SHANNAN, CI, MS   9/4/2024

## 2024-09-16 DIAGNOSIS — E66.9 DIABETES MELLITUS TYPE 2 IN OBESE: ICD-10-CM

## 2024-09-16 DIAGNOSIS — E11.69 DIABETES MELLITUS TYPE 2 IN OBESE: ICD-10-CM

## 2024-09-16 NOTE — TELEPHONE ENCOUNTER
----- Message from Rachel Uribe sent at 9/16/2024 10:34 AM CDT -----  Regarding: refill  insulin aspart U-100 (NOVOLOG) 100 unit/mL (3 mL) InPn pen    [facility]

## 2024-09-17 RX ORDER — INSULIN ASPART 100 [IU]/ML
30 INJECTION, SOLUTION INTRAVENOUS; SUBCUTANEOUS
Qty: 9 ML | Refills: 2 | Status: SHIPPED | OUTPATIENT
Start: 2024-09-17 | End: 2024-12-16

## 2024-09-18 ENCOUNTER — CLINICAL SUPPORT (OUTPATIENT)
Dept: REHABILITATION | Facility: HOSPITAL | Age: 64
End: 2024-09-18
Payer: MEDICAID

## 2024-09-18 DIAGNOSIS — G81.91 RIGHT HEMIPARESIS: ICD-10-CM

## 2024-09-18 DIAGNOSIS — I63.312 CEREBROVASCULAR ACCIDENT (CVA) DUE TO THROMBOSIS OF LEFT MIDDLE CEREBRAL ARTERY: Primary | ICD-10-CM

## 2024-09-18 PROCEDURE — 97110 THERAPEUTIC EXERCISES: CPT | Mod: CQ

## 2024-09-18 NOTE — PROGRESS NOTES
Physical Therapy Daily Note     Name: Lisa Hendricks  Clinic Number: [id number]  Diagnosis:     G81.91 (ICD-10-CM) - Right hemiparesis  R26.81 (ICD-10-CM) - Gait instability     6TH VISIT NEXT TIME !!!!    Physician: Herbert Barbour, DO  Precautions: none  Visit #: 17 of 28  PTA Visit #: 5  Time In: 1000  Time Out: 1100  FOTO  Initial score: 25  6/18/2024  Current score: 51 7/24/2024,   50 8/9/24;   51 - 9/18/2024  MDC 9 points         Subjective     Pt reports: in wheelchair today.  No complaints of headache, vertigo, or pain. Wants a new hemiwalker to use at home.   Pain Scale: Lisa rates pain on a scale of 0-10 to be 0.     Objective       Lisa received individual therapeutic exercises, activities, neuromuscular re-education to develop strength, endurance, ROM, flexibility, posture, and core stabilization for 30 minutes including:    All BOLD exercises performed today:     Ambulation Circuit with hemiwalker  - 3 rounds     Obstacle training.   60 feet; X2 SBA to CGA    75 feet SBA to CGA     Standing balance with RUE bent row - assisted.   Mat scooting with RLE assistance for WB   Side Stepping with hemiwalker  Standing weight shifts   Postural resisted exercises on wall.      Lisa received the following manual therapy techniques: 10 minutes - ROM of RLE  hip external rotators and flexors ; contract and relax hip flexion to knee extension blocking hip abduction and ER.     Gait Training - 50 ft with CGA, with lindsay walker.       Education provided re: Standing more often and postural exercises at wall     Lisa verbalized good understanding of education provided.   No spiritual or educational barriers to learning provided    Assessment     Continued progression in ambulation training with hemiwalker and obstacles, STS transfers and standing balance. Pt given minimal contact cues and assistance for ambulation. Pt has weakness in core and RLE hip. Cues to correct  out toeing in RLE, weight shifting. Pt has improved endurance for all activities today.     Pt needs continued therapy to improve functional mobility and achieve PT goals.       This is a 64 y.o. female referred to outpatient physical therapy and presents with a medical diagnosis of   Encounter Diagnoses   Name Primary?    Cerebrovascular accident (CVA) due to thrombosis of left middle cerebral artery Yes    Right hemiparesis     and demonstrates limitations as described in the problem list. Pt prognosis is Good. Pt will continue to benefit from skilled outpatient physical therapy to address the deficits listed in the problem list, provide pt/family education and to maximize pt's level of independence in the home and community environment.     Goals as follows:  Medical necessity is demonstrated by the following IMPAIRMENTS/PROBLEMS:  1. Impaired balance  2. Impaired coordination  3. Impaired strength  4. Decreased tolerance functional activities  5. Impaired postural control        Pt's spiritual, cultural and educational needs considered and pt agreeable to plan of care and goals as stated below:      Anticipated Barriers for physical therapy:      Short Term GOALS: 3 weeks. Pt agrees with goals set.  Patient demonstrates independence with home exercise program. -MET  Patient demonstrates postural awareness with all activities. Progressing  Patient reports no falls with functional activities. - Progressing     Long Term GOALS: 8  weeks. Pt agrees with goals set.  Patient demonstrates improvement in functional mobility as evidenced by improvement in FOTO to 33 or greater.  (Met 8/2/2024)  Patient demonstrates improvement in functional mobility as evidenced by improvement in FOTO to 60 or greater.  Patient demonstrates improvement in (right) ankle range of motion to impact mobility) - progessing 8/2/2024  Patient demonstrates improvement in (bilateral) LE strength by at least 1/2 grade or greater.   Patient  demonstrates decreased gait deviations. Progressing   Patient demonstrates improvement in transfer technique. - MET  Goals PRN.         Plan     Continue with established Plan of Care towards PT goals.  Outpatient physical therapy 2 times weekly to include: pt ed, hep, therapeutic exercises, neuromuscular re-education/ balance exercises, manual therapy and modalities prn. Cont PT for  6 weeks. Pt may be seen by PTA as part of the rehabilitation team.   Certification dates: 6/18/2024-8/16/2024  NEW POC : 8/16/24-9/27/24    Therapist: Bobbi Correa, PTA, CI, MS, MFDc   9/18/2024

## 2024-09-20 ENCOUNTER — CLINICAL SUPPORT (OUTPATIENT)
Dept: REHABILITATION | Facility: HOSPITAL | Age: 64
End: 2024-09-20
Payer: MEDICAID

## 2024-09-20 DIAGNOSIS — I63.312 CEREBROVASCULAR ACCIDENT (CVA) DUE TO THROMBOSIS OF LEFT MIDDLE CEREBRAL ARTERY: Primary | ICD-10-CM

## 2024-09-20 DIAGNOSIS — R53.1 UNILATERAL WEAKNESS: ICD-10-CM

## 2024-09-20 DIAGNOSIS — R26.81 GAIT INSTABILITY: Chronic | ICD-10-CM

## 2024-09-20 PROCEDURE — 97110 THERAPEUTIC EXERCISES: CPT

## 2024-09-20 NOTE — PROGRESS NOTES
Physical Therapy Daily Note     Name: Lisa Hendricks  Clinic Number: [id number]  Diagnosis:     G81.91 (ICD-10-CM) - Right hemiparesis  R26.81 (ICD-10-CM) - Gait instability       Physician: Herbert Barbour, DO  Precautions: none  Visit #: 18 of 28  PTA Visit #: 0  Time In: 1000  Time Out: 1100  MDC 9 points           Subjective     Pt reports: she wants to work on her balance and walking. She feels unbalanced and unsteady at times. She is reporting dizziness periodically during treatment today. Reports that she does have high BP.     Pain Scale: Lisa rates pain on a scale of 0-10 to be 0.     Objective     Vitals:   During treatment (sitting)   BP After walking = 187/83 mmHg LUE    At rest = 167/74 mmHg LUE   HR After walking = 71 bpm    At rest = 69 bpm       Lisa received individual therapeutic exercises, activities, neuromuscular re-education to develop strength, endurance, ROM, flexibility, posture, and core stabilization including:    All BOLD exercises performed today:     Ambulation Circuit with hemiwalker  - 3 rounds   Obstacle training   Standing balance with RUE bent row - assisted.   Mat scooting with RLE assistance for WB   Side Stepping with hemiwalker  Standing weight shifts with no UE support  Postural resisted exercises on wall  Piriformis self stretch  Bridging  STS transfer training  Bed mobility training  Static standing balance training no UE support    Lisa received the following manual therapy techniques: none today    Gait Training - 80 ft with CGA, with lindsay walker    Education provided re: Standing more often and postural exercises at wall     Lisa verbalized good understanding of education provided.   No spiritual or educational barriers to learning provided    Assessment     Patient tolerated treatment well today. Significant muscle tightness noted in BLE, moderate improvement with manually assisted stretches. Patient demonstrates  decreased weight shifting and decreased stance phase of RLE during ambulation with lindsay walker. Moderately improved weight shifting with verbal and tactile cues during balance training. Patient pleased with POC, continue to progress per patient tolerance.     Pt needs continued therapy to improve functional mobility and achieve PT goals.       This is a 64 y.o. female referred to outpatient physical therapy and presents with a medical diagnosis of   Encounter Diagnoses   Name Primary?    Cerebrovascular accident (CVA) due to thrombosis of left middle cerebral artery Yes    Right hemiparesis     and demonstrates limitations as described in the problem list. Pt prognosis is Good. Pt will continue to benefit from skilled outpatient physical therapy to address the deficits listed in the problem list, provide pt/family education and to maximize pt's level of independence in the home and community environment.     Goals as follows:  Medical necessity is demonstrated by the following IMPAIRMENTS/PROBLEMS:  1. Impaired balance  2. Impaired coordination  3. Impaired strength  4. Decreased tolerance functional activities  5. Impaired postural control        Pt's spiritual, cultural and educational needs considered and pt agreeable to plan of care and goals as stated below:      Anticipated Barriers for physical therapy:      Short Term GOALS: 3 weeks. Pt agrees with goals set.  Patient demonstrates independence with home exercise program. -MET  Patient demonstrates postural awareness with all activities. Progressing  Patient reports no falls with functional activities. - Progressing     Long Term GOALS: 8  weeks. Pt agrees with goals set.  Patient demonstrates improvement in functional mobility as evidenced by improvement in FOTO to 33 or greater.  (Met 8/2/2024)  Patient demonstrates improvement in functional mobility as evidenced by improvement in FOTO to 60 or greater.  Patient demonstrates improvement in (right) ankle  range of motion to impact mobility) - progessing 8/2/2024  Patient demonstrates improvement in (bilateral) LE strength by at least 1/2 grade or greater.   Patient demonstrates decreased gait deviations. Progressing   Patient demonstrates improvement in transfer technique. - MET  Goals PRN.         Plan     Continue with established Plan of Care towards PT goals.    Outpatient physical therapy 2 times weekly to include: pt ed, hep, therapeutic exercises, neuromuscular re-education/ balance exercises, manual therapy and modalities prn. Cont PT for  6 weeks. Pt may be seen by PTA as part of the rehabilitation team.   Certification dates: 6/18/2024-8/16/2024  NEW POC : 8/16/24-9/27/24    Therapist: Silvio Conner, PT, DPT   9/20/2024

## 2024-09-25 ENCOUNTER — CLINICAL SUPPORT (OUTPATIENT)
Dept: REHABILITATION | Facility: HOSPITAL | Age: 64
End: 2024-09-25
Payer: MEDICAID

## 2024-09-25 DIAGNOSIS — I63.312 CEREBROVASCULAR ACCIDENT (CVA) DUE TO THROMBOSIS OF LEFT MIDDLE CEREBRAL ARTERY: Primary | ICD-10-CM

## 2024-09-25 PROCEDURE — 97110 THERAPEUTIC EXERCISES: CPT | Mod: CQ

## 2024-09-25 NOTE — PROGRESS NOTES
Physical Therapy Daily Note     Name: Lisa Hendricks  Clinic Number: [id number]  Diagnosis:     G81.91 (ICD-10-CM) - Right hemiparesis  R26.81 (ICD-10-CM) - Gait instability       Physician: Herbert Barbour, DO  Precautions: none  Visit #: 19 of 28  PTA Visit #: 1  Time In: 1000  Time Out: 1100  MDC 9 points           Subjective     Pt reports: a little sore from last time. Feeling ok.     Pain Scale: Lisa rates pain on a scale of 0-10 to be 0.     Objective         Lisa received individual therapeutic exercises, activities, neuromuscular re-education to develop strength, endurance, ROM, flexibility, posture, and core stabilization including:    All BOLD exercises performed today:     Ambulation Circuit with hemiwalker  - 2 rounds     Weight Shifting to RLE series 3 rounds -     Standing weight shifts RLE with no UE support    Reaching down to cone to RIGHT Side (weight shifting) at mat.   Standing with assistance to low step with static lunges (RLE Knee flexed)  Seated RLE hip flexion   STS with leaning to RLE         Lisa received the following manual therapy techniques: none today    Gait Training - 80 ft with CGA, with lindsay walker    Education provided re: Standing with emphasis on RLE.     Lisa verbalized good understanding of education provided.   No spiritual or educational barriers to learning provided    Assessment     Pt continues to have step to gait pattern. Rigid in RLE and thorax. Today's session focused on proprioception and functional mobility to RIGHT side (include in HEP). Pt endurance was good for standing and strengthening. Pt required assistance for step, placement of RLE and proprioception.     Pt needs continued therapy to improve functional mobility and achieve PT goals.       This is a 64 y.o. female referred to outpatient physical therapy and presents with a medical diagnosis of   Encounter Diagnoses   Name Primary?    Cerebrovascular  accident (CVA) due to thrombosis of left middle cerebral artery Yes    Right hemiparesis     and demonstrates limitations as described in the problem list. Pt prognosis is Good. Pt will continue to benefit from skilled outpatient physical therapy to address the deficits listed in the problem list, provide pt/family education and to maximize pt's level of independence in the home and community environment.     Goals as follows:  Medical necessity is demonstrated by the following IMPAIRMENTS/PROBLEMS:  1. Impaired balance  2. Impaired coordination  3. Impaired strength  4. Decreased tolerance functional activities  5. Impaired postural control        Pt's spiritual, cultural and educational needs considered and pt agreeable to plan of care and goals as stated below:      Anticipated Barriers for physical therapy:      Short Term GOALS: 3 weeks. Pt agrees with goals set.  Patient demonstrates independence with home exercise program. -MET  Patient demonstrates postural awareness with all activities. Progressing  Patient reports no falls with functional activities. - Progressing     Long Term GOALS: 8  weeks. Pt agrees with goals set.  Patient demonstrates improvement in functional mobility as evidenced by improvement in FOTO to 33 or greater.  (Met 8/2/2024)  Patient demonstrates improvement in functional mobility as evidenced by improvement in FOTO to 60 or greater.  Patient demonstrates improvement in (right) ankle range of motion to impact mobility) - progessing 8/2/2024  Patient demonstrates improvement in (bilateral) LE strength by at least 1/2 grade or greater.   Patient demonstrates decreased gait deviations. Progressing   Patient demonstrates improvement in transfer technique. - MET  Goals PRN.         Plan     Continue with established Plan of Care towards PT goals.    Outpatient physical therapy 2 times weekly to include: pt ed, hep, therapeutic exercises, neuromuscular re-education/ balance exercises, manual  therapy and modalities prn. Cont PT for  6 weeks. Pt may be seen by PTA as part of the rehabilitation team.   Certification dates: 6/18/2024-8/16/2024  NEW POC : 8/16/24-9/27/24    Therapist: Bobbi Correa, SHANNAN, Ci, MS, MFDc   9/25/2024

## 2024-09-27 ENCOUNTER — TELEPHONE (OUTPATIENT)
Dept: REHABILITATION | Facility: HOSPITAL | Age: 64
End: 2024-09-27
Payer: MEDICAID

## 2024-09-27 ENCOUNTER — CLINICAL SUPPORT (OUTPATIENT)
Dept: REHABILITATION | Facility: HOSPITAL | Age: 64
End: 2024-09-27
Payer: MEDICAID

## 2024-09-27 DIAGNOSIS — I63.312 CEREBROVASCULAR ACCIDENT (CVA) DUE TO THROMBOSIS OF LEFT MIDDLE CEREBRAL ARTERY: Primary | ICD-10-CM

## 2024-09-27 DIAGNOSIS — G81.91 RIGHT HEMIPARESIS: ICD-10-CM

## 2024-09-27 DIAGNOSIS — R26.81 GAIT INSTABILITY: ICD-10-CM

## 2024-09-27 DIAGNOSIS — R53.1 UNILATERAL WEAKNESS: ICD-10-CM

## 2024-09-27 PROCEDURE — 97110 THERAPEUTIC EXERCISES: CPT

## 2024-09-27 NOTE — PLAN OF CARE
Physical Therapy DC Note     Name: Lisa Hendricks  Clinic Number: [id number]  Diagnosis:     G81.91 (ICD-10-CM) - Right hemiparesis  R26.81 (ICD-10-CM) - Gait instability       Physician: Herbert Barbour, DO  Precautions: none  Visit #: 20 of 28  PTA Visit #: 0  Time In: 1000  Time Out: 1100  MDC 9 points           Subjective     Pt reports: she is feeling some soreness in her low back, but no significant pain is reported.     Pain Scale: Lisa rates pain on a scale of 0-10 to be 0.     Objective     ROM:  Ankle Left (active range of motion) Right (passive range of motion)   Dorsiflexion WNL  (10) degrees   Plantarflexion WNL  (50) degrees   Inversion WNL  (20) degrees   Eversion WNL  (20) degrees      Upper Extremity Strength- see occupational therapist assessment for details     Lower Extremity Strength  Right LE   Left LE     Hip Flexion: 3+/5 Hip Flexion: 4/5   Hip Abduction: 3-/5 Hip Abduction: 4/5   Hip IR: 3-/5 Hip IR: 4/5   Hip ER: 3-/5 Hip ER: 4/5   Hip Extension: 3-/5 Hip Extension: 4/5   Knee extension: 4-/5 Knee extension: 4/5   Knee flexion: 3+/5 Knee flexion: 4/5   Ankle dorsiflexion: 2+ Ankle dorsiflexion:   4/5   Ankle plantarflexion: 2+ Ankle plantarflexion: 4/5      GAIT: Lisa ambulates throughout the clinic with hemiwalker with CGA and cuing for step length and weight shifting to R side      GAIT DEVIATIONS: Lisa displays trunk flexion, right) foot pronation and ER with decreased weight shift to her R side with L LE swing phase. Ambulates with HW      TRANSFERS: functional transfers with HW with SBA/CGA assist with cueing for safety and proper hand and foot placement.         Lisa received individual therapeutic exercises, activities, neuromuscular re-education to develop strength, endurance, ROM, flexibility, posture, and core stabilization including:    All BOLD exercises performed today:     Ambulation Circuit with hemiwalker  - 2 rounds      Weight Shifting to RLE series 3 rounds -     Standing weight shifts RLE with no UE support    Reaching down to cone to RIGHT Side (weight shifting) at mat.   Standing with assistance to low step with static lunges (RLE Knee flexed)  Seated RLE hip flexion   STS with leaning to RLE         Lisa received the following manual therapy techniques: none today    Gait Training - 60 ft with CGA, with lindsay walker    Education provided re: Standing with emphasis on RLE weight bearing along with proper weight shifting for stepping. Patient educated on safety of reaching back for chair and proper LE positioning when performing stand to sit     Lisa verbalized good understanding of education provided.   No spiritual or educational barriers to learning provided    Assessment     Patient has progressed well with treatment and she is appropriate for DC at this time. She is able to perform sit<>stand with SBA with HW with significant improvements in weight dispersement into B LE. She is able to identify when her weight shifting is not appropriate to her right side during static stance and ambulation. This is leading to decrease fall risk. Patient is demonstrating improve strength in R LE leading to improved functional mobility tolerance and improved ADL independence. Patient will continue to perform her HEP and call clinic PRN.         This is a 64 y.o. female referred to outpatient physical therapy and presents with a medical diagnosis of   Encounter Diagnoses   Name Primary?    Cerebrovascular accident (CVA) due to thrombosis of left middle cerebral artery Yes    Right hemiparesis     and demonstrates limitations as described in the problem list. Pt prognosis is Good.     Goals as follows:  Medical necessity is demonstrated by the following IMPAIRMENTS/PROBLEMS:  1. Impaired balance  2. Impaired coordination  3. Impaired strength  4. Decreased tolerance functional activities  5. Impaired postural control        Pt's spiritual,  cultural and educational needs considered and pt agreeable to plan of care and goals as stated below:      Anticipated Barriers for physical therapy:      Short Term GOALS: 3 weeks. Pt agrees with goals set.  Patient demonstrates independence with home exercise program. -MET  Patient demonstrates postural awareness with all activities. MET  Patient reports no falls with functional activities. - MET     Long Term GOALS: 8  weeks. Pt agrees with goals set.  Patient demonstrates improvement in functional mobility as evidenced by improvement in FOTO to 33 or greater.  (Met 8/2/2024)  Patient demonstrates improvement in functional mobility as evidenced by improvement in FOTO to 60 or greater.  Patient demonstrates improvement in (right) ankle range of motion to impact mobility) - MET  Patient demonstrates improvement in (bilateral) LE strength by at least 1/2 grade or greater. MET  Patient demonstrates decreased gait deviations. MET   Patient demonstrates improvement in transfer technique. - MET  Goals PRN.         Plan     Patient will be DC this visit and be diligent with her HEP.         Therapist: Carloz Medina, PT, DPT, MTC   9/27/2024

## 2024-09-27 NOTE — PROGRESS NOTES
Physical Therapy DC Note     Name: Lisa Hendricks  Clinic Number: [id number]  Diagnosis:     G81.91 (ICD-10-CM) - Right hemiparesis  R26.81 (ICD-10-CM) - Gait instability       Physician: Herbert Barbour, DO  Precautions: none  Visit #: 20 of 28  PTA Visit #: 0  Time In: 1000  Time Out: 1100  MDC 9 points           Subjective     Pt reports: she is feeling some soreness in her low back, but no significant pain is reported.     Pain Scale: Lisa rates pain on a scale of 0-10 to be 0.     Objective     ROM:  Ankle Left (active range of motion) Right (passive range of motion)   Dorsiflexion WNL  (10) degrees   Plantarflexion WNL  (50) degrees   Inversion WNL  (20) degrees   Eversion WNL  (20) degrees      Upper Extremity Strength- see occupational therapist assessment for details     Lower Extremity Strength  Right LE   Left LE     Hip Flexion: 3+/5 Hip Flexion: 4/5   Hip Abduction: 3-/5 Hip Abduction: 4/5   Hip IR: 3-/5 Hip IR: 4/5   Hip ER: 3-/5 Hip ER: 4/5   Hip Extension: 3-/5 Hip Extension: 4/5   Knee extension: 4-/5 Knee extension: 4/5   Knee flexion: 3+/5 Knee flexion: 4/5   Ankle dorsiflexion: 2+ Ankle dorsiflexion:   4/5   Ankle plantarflexion: 2+ Ankle plantarflexion: 4/5      GAIT: Lisa ambulates throughout the clinic with hemiwalker with CGA and cuing for step length and weight shifting to R side      GAIT DEVIATIONS: Lisa displays trunk flexion, right) foot pronation and ER with decreased weight shift to her R side with L LE swing phase. Ambulates with HW      TRANSFERS: functional transfers with HW with SBA/CGA assist with cueing for safety and proper hand and foot placement.         Lisa received individual therapeutic exercises, activities, neuromuscular re-education to develop strength, endurance, ROM, flexibility, posture, and core stabilization including:    All BOLD exercises performed today:     Ambulation Circuit with hemiwalker  - 2 rounds      Weight Shifting to RLE series 3 rounds -     Standing weight shifts RLE with no UE support    Reaching down to cone to RIGHT Side (weight shifting) at mat.   Standing with assistance to low step with static lunges (RLE Knee flexed)  Seated RLE hip flexion   STS with leaning to RLE         Lisa received the following manual therapy techniques: none today    Gait Training - 60 ft with CGA, with lindsay walker    Education provided re: Standing with emphasis on RLE weight bearing along with proper weight shifting for stepping. Patient educated on safety of reaching back for chair and proper LE positioning when performing stand to sit     Lisa verbalized good understanding of education provided.   No spiritual or educational barriers to learning provided    Assessment     Patient has progressed well with treatment and she is appropriate for DC at this time. She is able to perform sit<>stand with SBA with HW with significant improvements in weight dispersement into B LE. She is able to identify when her weight shifting is not appropriate to her right side during static stance and ambulation. This is leading to decrease fall risk. Patient is demonstrating improve strength in R LE leading to improved functional mobility tolerance and improved ADL independence. Patient will continue to perform her HEP and call clinic PRN.         This is a 64 y.o. female referred to outpatient physical therapy and presents with a medical diagnosis of   Encounter Diagnoses   Name Primary?    Cerebrovascular accident (CVA) due to thrombosis of left middle cerebral artery Yes    Right hemiparesis     and demonstrates limitations as described in the problem list. Pt prognosis is Good.     Goals as follows:  Medical necessity is demonstrated by the following IMPAIRMENTS/PROBLEMS:  1. Impaired balance  2. Impaired coordination  3. Impaired strength  4. Decreased tolerance functional activities  5. Impaired postural control        Pt's spiritual,  cultural and educational needs considered and pt agreeable to plan of care and goals as stated below:      Anticipated Barriers for physical therapy:      Short Term GOALS: 3 weeks. Pt agrees with goals set.  Patient demonstrates independence with home exercise program. -MET  Patient demonstrates postural awareness with all activities. MET  Patient reports no falls with functional activities. - MET     Long Term GOALS: 8  weeks. Pt agrees with goals set.  Patient demonstrates improvement in functional mobility as evidenced by improvement in FOTO to 33 or greater.  (Met 8/2/2024)  Patient demonstrates improvement in functional mobility as evidenced by improvement in FOTO to 60 or greater.  Patient demonstrates improvement in (right) ankle range of motion to impact mobility) - MET  Patient demonstrates improvement in (bilateral) LE strength by at least 1/2 grade or greater. MET  Patient demonstrates decreased gait deviations. MET   Patient demonstrates improvement in transfer technique. - MET  Goals PRN.         Plan     Patient will be DC this visit and be diligent with her HEP.         Therapist: Carloz Medina, PT, DPT, MTC   9/27/2024

## 2024-10-11 ENCOUNTER — PATIENT MESSAGE (OUTPATIENT)
Dept: ADMINISTRATIVE | Facility: HOSPITAL | Age: 64
End: 2024-10-11
Payer: MEDICAID

## 2024-12-19 ENCOUNTER — PATIENT MESSAGE (OUTPATIENT)
Dept: ADMINISTRATIVE | Facility: HOSPITAL | Age: 64
End: 2024-12-19
Payer: MEDICAID

## 2024-12-24 DIAGNOSIS — E11.9 TYPE 2 DIABETES MELLITUS WITHOUT COMPLICATION, UNSPECIFIED WHETHER LONG TERM INSULIN USE: ICD-10-CM

## 2024-12-30 DIAGNOSIS — E66.9 DIABETES MELLITUS TYPE 2 IN OBESE: ICD-10-CM

## 2024-12-30 DIAGNOSIS — E11.69 DIABETES MELLITUS TYPE 2 IN OBESE: ICD-10-CM

## 2024-12-30 RX ORDER — INSULIN ASPART 100 [IU]/ML
30 INJECTION, SOLUTION INTRAVENOUS; SUBCUTANEOUS
Qty: 9 ML | Refills: 2 | Status: CANCELLED | OUTPATIENT
Start: 2024-12-30 | End: 2025-03-30

## 2024-12-30 NOTE — TELEPHONE ENCOUNTER
----- Message from Rachel sent at 12/30/2024 10:28 AM CST -----  Regarding: Refill  insulin aspart U-100 (NOVOLOG) 100 unit/mL (3 mL) InPn pen    [facility]

## 2025-01-02 DIAGNOSIS — E11.69 DIABETES MELLITUS TYPE 2 IN OBESE: ICD-10-CM

## 2025-01-02 DIAGNOSIS — E66.9 DIABETES MELLITUS TYPE 2 IN OBESE: ICD-10-CM

## 2025-01-02 RX ORDER — INSULIN ASPART 100 [IU]/ML
30 INJECTION, SOLUTION INTRAVENOUS; SUBCUTANEOUS
Qty: 9 ML | Refills: 2 | Status: SHIPPED | OUTPATIENT
Start: 2025-01-02 | End: 2025-04-02

## 2025-01-27 DIAGNOSIS — I63.312 CEREBROVASCULAR ACCIDENT (CVA) DUE TO THROMBOSIS OF LEFT MIDDLE CEREBRAL ARTERY: ICD-10-CM

## 2025-01-27 RX ORDER — CLOPIDOGREL BISULFATE 75 MG/1
75 TABLET ORAL DAILY
Qty: 30 TABLET | Refills: 0 | Status: SHIPPED | OUTPATIENT
Start: 2025-01-27

## 2025-02-08 ENCOUNTER — HOSPITAL ENCOUNTER (EMERGENCY)
Facility: HOSPITAL | Age: 65
Discharge: ANOTHER HOSPITAL | End: 2025-02-08
Attending: FAMILY MEDICINE
Payer: MEDICAID

## 2025-02-08 ENCOUNTER — HOSPITAL ENCOUNTER (INPATIENT)
Facility: HOSPITAL | Age: 65
LOS: 2 days | Discharge: HOME | DRG: 086 | End: 2025-02-10
Attending: EMERGENCY MEDICINE | Admitting: EMERGENCY MEDICINE
Payer: MEDICAID

## 2025-02-08 VITALS
WEIGHT: 230 LBS | HEART RATE: 88 BPM | RESPIRATION RATE: 13 BRPM | OXYGEN SATURATION: 95 % | SYSTOLIC BLOOD PRESSURE: 140 MMHG | TEMPERATURE: 98 F | DIASTOLIC BLOOD PRESSURE: 63 MMHG | BODY MASS INDEX: 39.27 KG/M2 | HEIGHT: 64 IN

## 2025-02-08 DIAGNOSIS — I60.9 SUBARACHNOID BLEED: ICD-10-CM

## 2025-02-08 DIAGNOSIS — E11.9 TYPE 2 DIABETES MELLITUS WITHOUT OPHTHALMIC MANIFESTATIONS: ICD-10-CM

## 2025-02-08 DIAGNOSIS — S06.5XAA SUBDURAL HEMATOMA: Primary | ICD-10-CM

## 2025-02-08 DIAGNOSIS — R07.9 CHEST PAIN: ICD-10-CM

## 2025-02-08 DIAGNOSIS — I62.9 INTRACRANIAL HEMORRHAGE: ICD-10-CM

## 2025-02-08 DIAGNOSIS — S06.6X0A SUBARACHNOID HEMORRHAGE FOLLOWING INJURY, NO LOSS OF CONSCIOUSNESS, INITIAL ENCOUNTER: Primary | ICD-10-CM
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
## 2025-02-08 LAB
ALBUMIN SERPL BCP-MCNC: 3.9 G/DL (ref 3.5–5.2)
ALP SERPL-CCNC: 129 U/L (ref 55–135)
ALT SERPL W/O P-5'-P-CCNC: 20 U/L (ref 10–44)
ANION GAP SERPL CALC-SCNC: 12 MMOL/L (ref 8–16)
APTT PPP: 24.4 SEC (ref 21–32)
AST SERPL-CCNC: 23 U/L (ref 10–40)
BACTERIA #/AREA URNS HPF: ABNORMAL /HPF
BASOPHILS # BLD AUTO: 0.08 K/UL (ref 0–0.2)
BASOPHILS NFR BLD: 0.5 % (ref 0–1.9)
BILIRUB SERPL-MCNC: 0.2 MG/DL (ref 0.1–1)
BILIRUB UR QL STRIP: NEGATIVE
BUN SERPL-MCNC: 15 MG/DL (ref 8–23)
CALCIUM SERPL-MCNC: 9 MG/DL (ref 8.7–10.5)
CHLORIDE SERPL-SCNC: 108 MMOL/L (ref 95–110)
CLARITY UR: CLEAR
CO2 SERPL-SCNC: 19 MMOL/L (ref 23–29)
COLOR UR: YELLOW
CREAT SERPL-MCNC: 0.9 MG/DL (ref 0.5–1.4)
DIFFERENTIAL METHOD BLD: ABNORMAL
EOSINOPHIL # BLD AUTO: 0.2 K/UL (ref 0–0.5)
EOSINOPHIL NFR BLD: 1.3 % (ref 0–8)
ERYTHROCYTE [DISTWIDTH] IN BLOOD BY AUTOMATED COUNT: 12.4 % (ref 11.5–14.5)
EST. GFR  (NO RACE VARIABLE): >60 ML/MIN/1.73 M^2
ESTIMATED AVG GLUCOSE: 217 MG/DL (ref 68–131)
GLUCOSE SERPL-MCNC: 353 MG/DL (ref 70–110)
GLUCOSE UR QL STRIP: ABNORMAL
HBA1C MFR BLD: 9.2 % (ref 4–5.6)
HCT VFR BLD AUTO: 43.8 % (ref 37–48.5)
HGB BLD-MCNC: 14.2 G/DL (ref 12–16)
HGB UR QL STRIP: NEGATIVE
HYALINE CASTS #/AREA URNS LPF: 0 /LPF
IMM GRANULOCYTES # BLD AUTO: 0.15 K/UL (ref 0–0.04)
IMM GRANULOCYTES NFR BLD AUTO: 0.9 % (ref 0–0.5)
INR PPP: 1 (ref 0.8–1.2)
KETONES UR QL STRIP: ABNORMAL
LEUKOCYTE ESTERASE UR QL STRIP: NEGATIVE
LYMPHOCYTES # BLD AUTO: 2.1 K/UL (ref 1–4.8)
LYMPHOCYTES NFR BLD: 12.2 % (ref 18–48)
MCH RBC QN AUTO: 30.1 PG (ref 27–31)
MCHC RBC AUTO-ENTMCNC: 32.4 G/DL (ref 32–36)
MCV RBC AUTO: 93 FL (ref 82–98)
MICROSCOPIC COMMENT: ABNORMAL
MONOCYTES # BLD AUTO: 1.1 K/UL (ref 0.3–1)
MONOCYTES NFR BLD: 6.3 % (ref 4–15)
NEUTROPHILS # BLD AUTO: 13.7 K/UL (ref 1.8–7.7)
NEUTROPHILS NFR BLD: 78.8 % (ref 38–73)
NITRITE UR QL STRIP: NEGATIVE
NRBC BLD-RTO: 0 /100 WBC
OHS QRS DURATION: 66 MS
OHS QTC CALCULATION: 448 MS
PH UR STRIP: 5 [PH] (ref 5–8)
PLATELET # BLD AUTO: 272 K/UL (ref 150–450)
PMV BLD AUTO: 11.4 FL (ref 9.2–12.9)
POCT GLUCOSE: 185 MG/DL (ref 70–110)
POCT GLUCOSE: 243 MG/DL (ref 70–110)
POCT GLUCOSE: 304 MG/DL (ref 70–110)
POTASSIUM SERPL-SCNC: 4.1 MMOL/L (ref 3.5–5.1)
PROT SERPL-MCNC: 7.6 G/DL (ref 6–8.4)
PROT UR QL STRIP: NEGATIVE
PROTHROMBIN TIME: 10.7 SEC (ref 9–12.5)
RBC # BLD AUTO: 4.71 M/UL (ref 4–5.4)
RBC #/AREA URNS HPF: 0 /HPF (ref 0–4)
SODIUM SERPL-SCNC: 139 MMOL/L (ref 136–145)
SP GR UR STRIP: 1 (ref 1–1.03)
SQUAMOUS #/AREA URNS HPF: 3 /HPF
URN SPEC COLLECT METH UR: ABNORMAL
UROBILINOGEN UR STRIP-ACNC: NEGATIVE EU/DL
WBC # BLD AUTO: 17.36 K/UL (ref 3.9–12.7)
WBC #/AREA URNS HPF: 15 /HPF (ref 0–5)
YEAST URNS QL MICRO: ABNORMAL

## 2025-02-08 PROCEDURE — 81000 URINALYSIS NONAUTO W/SCOPE: CPT | Performed by: FAMILY MEDICINE

## 2025-02-08 PROCEDURE — 85730 THROMBOPLASTIN TIME PARTIAL: CPT | Performed by: FAMILY MEDICINE

## 2025-02-08 PROCEDURE — 36415 COLL VENOUS BLD VENIPUNCTURE: CPT | Performed by: FAMILY MEDICINE

## 2025-02-08 PROCEDURE — 85025 COMPLETE CBC W/AUTO DIFF WBC: CPT | Performed by: FAMILY MEDICINE

## 2025-02-08 PROCEDURE — 63600175 PHARM REV CODE 636 W HCPCS

## 2025-02-08 PROCEDURE — 85610 PROTHROMBIN TIME: CPT | Performed by: FAMILY MEDICINE

## 2025-02-08 PROCEDURE — 63600175 PHARM REV CODE 636 W HCPCS: Mod: JZ,TB | Performed by: FAMILY MEDICINE

## 2025-02-08 PROCEDURE — 80053 COMPREHEN METABOLIC PANEL: CPT | Performed by: FAMILY MEDICINE

## 2025-02-08 PROCEDURE — 96366 THER/PROPH/DIAG IV INF ADDON: CPT

## 2025-02-08 PROCEDURE — 96361 HYDRATE IV INFUSION ADD-ON: CPT

## 2025-02-08 PROCEDURE — 25000003 PHARM REV CODE 250: Performed by: FAMILY MEDICINE

## 2025-02-08 PROCEDURE — 83036 HEMOGLOBIN GLYCOSYLATED A1C: CPT

## 2025-02-08 PROCEDURE — 96372 THER/PROPH/DIAG INJ SC/IM: CPT | Performed by: FAMILY MEDICINE

## 2025-02-08 PROCEDURE — 87086 URINE CULTURE/COLONY COUNT: CPT | Performed by: FAMILY MEDICINE

## 2025-02-08 PROCEDURE — 96374 THER/PROPH/DIAG INJ IV PUSH: CPT

## 2025-02-08 PROCEDURE — 11000001 HC ACUTE MED/SURG PRIVATE ROOM

## 2025-02-08 PROCEDURE — 93005 ELECTROCARDIOGRAM TRACING: CPT

## 2025-02-08 PROCEDURE — 82962 GLUCOSE BLOOD TEST: CPT

## 2025-02-08 PROCEDURE — 25000003 PHARM REV CODE 250

## 2025-02-08 PROCEDURE — 96365 THER/PROPH/DIAG IV INF INIT: CPT

## 2025-02-08 PROCEDURE — 99291 CRITICAL CARE FIRST HOUR: CPT

## 2025-02-08 PROCEDURE — 96375 TX/PRO/DX INJ NEW DRUG ADDON: CPT

## 2025-02-08 PROCEDURE — 99285 EMERGENCY DEPT VISIT HI MDM: CPT | Mod: 25

## 2025-02-08 RX ORDER — AMITRIPTYLINE HYDROCHLORIDE 25 MG/1
25 TABLET, FILM COATED ORAL NIGHTLY PRN
Status: DISCONTINUED | OUTPATIENT
Start: 2025-02-08 | End: 2025-02-10 | Stop reason: HOSPADM

## 2025-02-08 RX ORDER — LEVETIRACETAM 500 MG/1
500 TABLET ORAL
Status: COMPLETED | OUTPATIENT
Start: 2025-02-08 | End: 2025-02-08

## 2025-02-08 RX ORDER — NICARDIPINE HYDROCHLORIDE 0.2 MG/ML
INJECTION INTRAVENOUS
Status: COMPLETED
Start: 2025-02-08 | End: 2025-02-08

## 2025-02-08 RX ORDER — LEVETIRACETAM 500 MG/1
500 TABLET ORAL 2 TIMES DAILY
Qty: 60 TABLET | Refills: 11 | Status: CANCELLED | OUTPATIENT
Start: 2025-02-08 | End: 2026-02-08

## 2025-02-08 RX ORDER — ALBUTEROL SULFATE 90 UG/1
2 INHALANT RESPIRATORY (INHALATION) EVERY 6 HOURS PRN
Status: DISCONTINUED | OUTPATIENT
Start: 2025-02-08 | End: 2025-02-10 | Stop reason: HOSPADM

## 2025-02-08 RX ORDER — ACETAMINOPHEN 325 MG/1
650 TABLET ORAL
Status: COMPLETED | OUTPATIENT
Start: 2025-02-08 | End: 2025-02-08

## 2025-02-08 RX ORDER — POLYETHYLENE GLYCOL 3350 17 G/17G
17 POWDER, FOR SOLUTION ORAL DAILY
Status: DISCONTINUED | OUTPATIENT
Start: 2025-02-08 | End: 2025-02-10 | Stop reason: HOSPADM

## 2025-02-08 RX ORDER — LABETALOL HCL 20 MG/4 ML
20 SYRINGE (ML) INTRAVENOUS
Status: COMPLETED | OUTPATIENT
Start: 2025-02-08 | End: 2025-02-08

## 2025-02-08 RX ORDER — CETIRIZINE HYDROCHLORIDE 5 MG/1
10 TABLET ORAL DAILY
Status: DISCONTINUED | OUTPATIENT
Start: 2025-02-08 | End: 2025-02-10 | Stop reason: HOSPADM

## 2025-02-08 RX ORDER — GLUCAGON 1 MG
1 KIT INJECTION
Status: DISCONTINUED | OUTPATIENT
Start: 2025-02-08 | End: 2025-02-10 | Stop reason: HOSPADM

## 2025-02-08 RX ORDER — INSULIN ASPART 100 [IU]/ML
0-5 INJECTION, SOLUTION INTRAVENOUS; SUBCUTANEOUS
Status: DISCONTINUED | OUTPATIENT
Start: 2025-02-08 | End: 2025-02-10 | Stop reason: HOSPADM

## 2025-02-08 RX ORDER — ACETAMINOPHEN 325 MG/1
650 TABLET ORAL EVERY 4 HOURS PRN
Status: DISCONTINUED | OUTPATIENT
Start: 2025-02-08 | End: 2025-02-10 | Stop reason: HOSPADM

## 2025-02-08 RX ORDER — LOSARTAN POTASSIUM 25 MG/1
25 TABLET ORAL DAILY
Qty: 90 TABLET | Refills: 3 | Status: SHIPPED | OUTPATIENT
Start: 2025-02-08 | End: 2025-02-08 | Stop reason: CLARIF

## 2025-02-08 RX ORDER — METOPROLOL TARTRATE 25 MG/1
25 TABLET, FILM COATED ORAL 2 TIMES DAILY WITH MEALS
Status: DISCONTINUED | OUTPATIENT
Start: 2025-02-09 | End: 2025-02-10 | Stop reason: HOSPADM

## 2025-02-08 RX ORDER — GABAPENTIN 300 MG/1
300 CAPSULE ORAL NIGHTLY
Status: DISCONTINUED | OUTPATIENT
Start: 2025-02-08 | End: 2025-02-10 | Stop reason: HOSPADM

## 2025-02-08 RX ORDER — IBUPROFEN 200 MG
24 TABLET ORAL
Status: DISCONTINUED | OUTPATIENT
Start: 2025-02-08 | End: 2025-02-10 | Stop reason: HOSPADM

## 2025-02-08 RX ORDER — SODIUM CHLORIDE 0.9 % (FLUSH) 0.9 %
10 SYRINGE (ML) INJECTION EVERY 12 HOURS PRN
Status: DISCONTINUED | OUTPATIENT
Start: 2025-02-08 | End: 2025-02-10 | Stop reason: HOSPADM

## 2025-02-08 RX ORDER — METOPROLOL TARTRATE 25 MG/1
25 TABLET, FILM COATED ORAL
Status: COMPLETED | OUTPATIENT
Start: 2025-02-08 | End: 2025-02-08

## 2025-02-08 RX ORDER — NICARDIPINE HYDROCHLORIDE 0.2 MG/ML
2.5 INJECTION INTRAVENOUS CONTINUOUS
Status: DISCONTINUED | OUTPATIENT
Start: 2025-02-08 | End: 2025-02-08 | Stop reason: HOSPADM

## 2025-02-08 RX ORDER — NALOXONE HCL 0.4 MG/ML
0.02 VIAL (ML) INJECTION
Status: DISCONTINUED | OUTPATIENT
Start: 2025-02-08 | End: 2025-02-10 | Stop reason: HOSPADM

## 2025-02-08 RX ORDER — NICARDIPINE HYDROCHLORIDE 0.2 MG/ML
0-15 INJECTION INTRAVENOUS CONTINUOUS
Status: DISCONTINUED | OUTPATIENT
Start: 2025-02-08 | End: 2025-02-08

## 2025-02-08 RX ORDER — INSULIN ASPART 100 [IU]/ML
10 INJECTION, SOLUTION INTRAVENOUS; SUBCUTANEOUS
Status: DISCONTINUED | OUTPATIENT
Start: 2025-02-08 | End: 2025-02-09

## 2025-02-08 RX ORDER — KETOROLAC TROMETHAMINE 30 MG/ML
30 INJECTION, SOLUTION INTRAMUSCULAR; INTRAVENOUS
Status: COMPLETED | OUTPATIENT
Start: 2025-02-08 | End: 2025-02-08

## 2025-02-08 RX ORDER — AMLODIPINE BESYLATE 10 MG/1
10 TABLET ORAL DAILY
Status: DISCONTINUED | OUTPATIENT
Start: 2025-02-09 | End: 2025-02-10 | Stop reason: HOSPADM

## 2025-02-08 RX ORDER — IBUPROFEN 200 MG
16 TABLET ORAL
Status: DISCONTINUED | OUTPATIENT
Start: 2025-02-08 | End: 2025-02-10 | Stop reason: HOSPADM

## 2025-02-08 RX ORDER — LOSARTAN POTASSIUM 50 MG/1
50 TABLET ORAL DAILY
Status: DISCONTINUED | OUTPATIENT
Start: 2025-02-08 | End: 2025-02-10 | Stop reason: HOSPADM

## 2025-02-08 RX ORDER — LEVETIRACETAM 500 MG/1
500 TABLET ORAL 2 TIMES DAILY
Status: DISCONTINUED | OUTPATIENT
Start: 2025-02-08 | End: 2025-02-10 | Stop reason: HOSPADM

## 2025-02-08 RX ORDER — AMLODIPINE BESYLATE 10 MG/1
10 TABLET ORAL
Status: COMPLETED | OUTPATIENT
Start: 2025-02-08 | End: 2025-02-08

## 2025-02-08 RX ADMIN — KETOROLAC TROMETHAMINE 30 MG: 30 INJECTION, SOLUTION INTRAMUSCULAR; INTRAVENOUS at 12:02

## 2025-02-08 RX ADMIN — NICARDIPINE HYDROCHLORIDE 5 MG/HR: 0.2 INJECTION INTRAVENOUS at 06:02

## 2025-02-08 RX ADMIN — NICARDIPINE HYDROCHLORIDE 5 MG/HR: 0.2 INJECTION INTRAVENOUS at 02:02

## 2025-02-08 RX ADMIN — POLYETHYLENE GLYCOL 3350 17 G: 17 POWDER, FOR SOLUTION ORAL at 03:02

## 2025-02-08 RX ADMIN — LABETALOL HYDROCHLORIDE 20 MG: 5 INJECTION, SOLUTION INTRAVENOUS at 01:02

## 2025-02-08 RX ADMIN — AMLODIPINE BESYLATE 10 MG: 10 TABLET ORAL at 11:02

## 2025-02-08 RX ADMIN — SODIUM CHLORIDE 1000 ML: 9 INJECTION, SOLUTION INTRAVENOUS at 02:02

## 2025-02-08 RX ADMIN — GABAPENTIN 300 MG: 300 CAPSULE ORAL at 08:02

## 2025-02-08 RX ADMIN — LOSARTAN POTASSIUM 50 MG: 50 TABLET, FILM COATED ORAL at 12:02

## 2025-02-08 RX ADMIN — METOPROLOL TARTRATE 25 MG: 25 TABLET, FILM COATED ORAL at 11:02

## 2025-02-08 RX ADMIN — INSULIN HUMAN 5 UNITS: 100 INJECTION, SOLUTION PARENTERAL at 02:02

## 2025-02-08 RX ADMIN — LEVETIRACETAM 500 MG: 500 TABLET, FILM COATED ORAL at 08:02

## 2025-02-08 RX ADMIN — ACETAMINOPHEN 650 MG: 325 TABLET ORAL at 12:02

## 2025-02-08 RX ADMIN — LEVETIRACETAM 500 MG: 500 TABLET, FILM COATED ORAL at 12:02

## 2025-02-08 RX ADMIN — CETIRIZINE HYDROCHLORIDE 10 MG: 5 TABLET, FILM COATED ORAL at 03:02

## 2025-02-08 NOTE — ED NOTES
Patient HOB positioned to 30 degrees. ASH, AAOx4, speech clear and coherent. Denies any other symptoms besides persistent headache

## 2025-02-08 NOTE — ASSESSMENT & PLAN NOTE
Likely contributor to fall. PT states her strength is at baseline following fall. Wheelchair/walker at home     - PT/OT

## 2025-02-08 NOTE — SUBJECTIVE & OBJECTIVE
(Not in a hospital admission)      Review of patient's allergies indicates:   Allergen Reactions    Levemir [insulin detemir] Other (See Comments)     Sob rash to arms and chest  abd cramping diarrhea    Lisinopril Swelling    Metformin      Other reaction(s): severe gi upset    Shellfish containing products      Other^asthma    Tramadol hcl      Other reaction(s): anaphylaxis    Losartan Other (See Comments)     Muscle cramp       Past Medical History:   Diagnosis Date    Acute cystitis without hematuria 10/29/2020    Asthma     Cirrhosis of liver without mention of alcohol     CVA (cerebral vascular accident) 2021    Diabetes mellitus, type 2     Gout, unspecified     Hyperlipidemia     Hypertension     Obesity, unspecified     Unspecified vitamin D deficiency     Uterine cancer      Past Surgical History:   Procedure Laterality Date     SECTION  , ,  1985     x3    CHOLECYSTECTOMY  2011    COLONOSCOPY N/A 2017    Procedure: COLONOSCOPY;  Surgeon: Luis Bogran-Reyes, MD;  Location: [facility];  Service: Endoscopy;  Laterality: N/A;    HYSTERECTOMY      for uterine cancer    OOPHORECTOMY       Family History       Problem Relation (Age of Onset)    Alzheimer's disease Mother    Breast cancer Maternal Aunt    Diabetes Maternal Grandmother    Heart attack Father    Hypertension Mother, Father    No Known Problems Sister, Daughter, Maternal Uncle, Paternal Aunt, Paternal Uncle, Maternal Grandfather, Paternal Grandmother, Paternal Grandfather, Other    Stroke Mother          Tobacco Use    Smoking status: Former     Current packs/day: 0.00     Types: Cigarettes     Quit date: 1979     Years since quittin.0     Passive exposure: Past    Smokeless tobacco: Never   Substance and Sexual Activity    Alcohol use: No    Drug use: Never    Sexual activity: Not Currently     Partners: Male     Birth control/protection: Surgical     Review of Systems  Negative except as  above  Objective:     Weight: 101.6 kg (224 lb)  Body mass index is 38.45 kg/m².  Vital Signs (Most Recent):  Temp: 98.2 °F (36.8 °C) (02/08/25 0712)  Pulse: 60 (02/08/25 0931)  Resp: 15 (02/08/25 0931)  BP: (!) 123/59 (02/08/25 0931)  SpO2: (!) 94 % (02/08/25 0931) Vital Signs (24h Range):  Temp:  [97.9 °F (36.6 °C)-98.2 °F (36.8 °C)] 98.2 °F (36.8 °C)  Pulse:  [] 60  Resp:  [13-21] 15  SpO2:  [92 %-98 %] 94 %  BP: (121-174)/(50-79) 123/59                         Female External Urinary Catheter w/ Suction 02/08/25 0629 (Active)   Skin no redness;no breakdown 02/08/25 0630          Physical Exam     GENERAL: resting comfortably  HEENT: NCAT, PERRL, mucous membranes moist  NECK: supple, trachea midline  CV: normal capillary refill  PULM: aerating well, symmetric expansion, no distress  ABD: soft, NT, ND  EXT: no c/c/e     NEURO:     GCS 15 E4V5M6  AAO x 3  CN II-XII grossly intact  Fc x 4 antigravity  SILT     No drift or dysmetria        Neurosurgery Physical Exam    Significant Labs:  Recent Labs   Lab 02/08/25  0110   *      K 4.1      CO2 19*   BUN 15   CREATININE 0.9   CALCIUM 9.0     Recent Labs   Lab 02/08/25  0110   WBC 17.36*   HGB 14.2   HCT 43.8        Recent Labs   Lab 02/08/25  0110   INR 1.0   APTT 24.4     Microbiology Results (last 7 days)       ** No results found for the last 168 hours. **          All pertinent labs from the last 24 hours have been reviewed.    Significant Diagnostics:  I have reviewed all pertinent imaging results/findings within the past 24 hours.  I have reviewed and interpreted all pertinent imaging results/findings within the past 24 hours.  CT Head Without Contrast    Result Date: 2/8/2025  Small of volume areas of extra-axial hemorrhage, probably subarachnoid, in the right frontal and left occipital regions.  No mass effect. Findings were included on preliminary report sent by [facility]. Electronically signed by: Vishal Garcia MD  Date:    02/08/2025 Time:    09:14    CT Head Without Contrast    Result Date: 2/8/2025  CT head: 1. When compared to prior head CT performed 02/08/2025, 00:20 hours, no short-term change in appearance of relatively small volume recent subarachnoid hemorrhage centered about the posterolateral left temporoparietal cerebral convexity.  Additional more focal area of extra-axial hemorrhage about the high anterior right frontal lobe measuring the order of 6 mm also favored subarachnoid.  Overall, no definite new or enlarging areas of intracranial hemorrhage are suggested. 2. Focal high right anterior scalp hematoma measuring 26 mm in AP dimension. Posttraumatic changes of the scalp are additionally noted in right frontal, temporal, and supraorbital/preseptal periorbital soft tissues.  No associated skull fracture. CT cervical spine: 1. No acute cervical spine fracture. 2. Degenerative findings, as discussed. Electronically signed by: Octavio Crawford Date:    02/08/2025 Time:    07:41    CT Cervical Spine Without Contrast    Result Date: 2/8/2025  CT head: 1. When compared to prior head CT performed 02/08/2025, 00:20 hours, no short-term change in appearance of relatively small volume recent subarachnoid hemorrhage centered about the posterolateral left temporoparietal cerebral convexity.  Additional more focal area of extra-axial hemorrhage about the high anterior right frontal lobe measuring the order of 6 mm also favored subarachnoid.  Overall, no definite new or enlarging areas of intracranial hemorrhage are suggested. 2. Focal high right anterior scalp hematoma measuring 26 mm in AP dimension. Posttraumatic changes of the scalp are additionally noted in right frontal, temporal, and supraorbital/preseptal periorbital soft tissues.  No associated skull fracture. CT cervical spine: 1. No acute cervical spine fracture. 2. Degenerative findings, as discussed. Electronically signed by: Octavio Crawford Date:    02/08/2025  Time:    07:41

## 2025-02-08 NOTE — ASSESSMENT & PLAN NOTE
Ms Hendricks is a 65F w/ hx ASA/Plavix for stroke years ago presenting after mechanical ground level fall transfer from OSH with small focal R SDH without significant mass effect. Grossly neurologically intact on bedside examination. Holding ASA/Plavix. No reversal required. CT Csp without acute fracture    Plan:  --All labs and diagnostics reviewed  --Rpx CTH stable  --Hold ASA/Plavix until outpatient follow up 2 weeks with repeat CTH  --SBP <160   --Na >135  --Keppra 500 BID x7d  --HOB >30  --Okay for diet; DVT ppx okay 24 hrs after repeat scan  --No further inpatient workup from nsgy perspective; no acute neurosurgical intervention    Dispo: per ED; will follow up in 2 weeks outpatient with repeat CTH

## 2025-02-08 NOTE — DISCHARGE INSTRUCTIONS
Discontinue Plavix: Please stop taking Plavix immediately. You do not need to take it until further notice from your doctor.  Hold Aspirin: Hold off on taking aspirin for the next 2 weeks. This is necessary to ensure proper healing of the small subarachnoid hemorrhage.  Repeat CT Scan: You will need to return for a repeat CT scan in 2 weeks to check if the bleeding has resolved. One has been ordered.   Resume Aspirin: After the results of the CT scan are reviewed by your doctor, you may resume aspirin if instructed to do so.  Follow-Up with Primary Care Provider: Please make sure to schedule a follow-up with your primary care provider soon to review your condition and the next steps in your care.

## 2025-02-08 NOTE — ED PROVIDER NOTES
Encounter Date: 2025       History     Chief Complaint   Patient presents with    Transfer     Sent from [facility] for neurosurgery eval of subarachnoid. Arrives on Cardene at 5mg/hr.     Patient is a 65-year-old with history of CVA with residual right-sided weakness presenting as a transfer for neurosurgical evaluation for subdural hemorrhage.  Patient had a fall last evening the included head trauma on LOC.  She was rearranging her bed when she slipped, lost balance and fell.  She denies pain anywhere else.  She states that she always walks with a walker due to her residual right-sided weakness.  She is on aspirin and Plavix.        Review of patient's allergies indicates:   Allergen Reactions    Levemir [insulin detemir] Other (See Comments)     Sob rash to arms and chest  abd cramping diarrhea    Lisinopril Swelling    Metformin      Other reaction(s): severe gi upset    Shellfish containing products      Other^asthma    Tramadol hcl      Other reaction(s): anaphylaxis    Losartan Other (See Comments)     Muscle cramp     Past Medical History:   Diagnosis Date    Acute cystitis without hematuria 10/29/2020    Asthma     Cirrhosis of liver without mention of alcohol     CVA (cerebral vascular accident) 2021    Diabetes mellitus, type 2     Gout, unspecified     Hyperlipidemia     Hypertension     Obesity, unspecified     Unspecified vitamin D deficiency     Uterine cancer      Past Surgical History:   Procedure Laterality Date     SECTION  , 1984,  1985     x3    CHOLECYSTECTOMY  2011    COLONOSCOPY N/A 2017    Procedure: COLONOSCOPY;  Surgeon: Luis Bogran-Reyes, MD;  Location: [facility];  Service: Endoscopy;  Laterality: N/A;    HYSTERECTOMY      for uterine cancer    OOPHORECTOMY       Family History   Problem Relation Name Age of Onset    Alzheimer's disease Mother      Stroke Mother      Hypertension Mother      Heart attack Father      Hypertension Father      No  Known Problems Sister 0     No Known Problems Daughter 1     Breast cancer Maternal Aunt      No Known Problems Maternal Uncle      No Known Problems Paternal Aunt      No Known Problems Paternal Uncle      Diabetes Maternal Grandmother      No Known Problems Maternal Grandfather      No Known Problems Paternal Grandmother      No Known Problems Paternal Grandfather      No Known Problems Other      Ovarian cancer Neg Hx      BRCA 1/2 Neg Hx       Social History     Tobacco Use    Smoking status: Former     Current packs/day: 0.00     Types: Cigarettes     Quit date: 1979     Years since quittin.0     Passive exposure: Past    Smokeless tobacco: Never   Substance Use Topics    Alcohol use: No    Drug use: Never     Review of Systems  Per HPI  Physical Exam     Initial Vitals [25 0555]   BP Pulse Resp Temp SpO2   (!) 129/50 91 17 98.2 °F (36.8 °C) 96 %      MAP       --         Physical Exam    Constitutional: She appears well-developed and well-nourished. She is not diaphoretic. No distress.   HENT:   Large bump on right side of her hairline with hematoma   Eyes: EOM are normal. Pupils are equal, round, and reactive to light. Right eye exhibits no discharge. Left eye exhibits no discharge. No scleral icterus.   Cardiovascular:  Normal rate, regular rhythm and normal heart sounds.           No murmur heard.  Pulmonary/Chest: Breath sounds normal. No stridor. No respiratory distress. She has no wheezes. She has no rhonchi. She has no rales.   Abdominal: Abdomen is soft. She exhibits no distension. There is no abdominal tenderness. There is no rebound and no guarding.   Musculoskeletal:         General: No tenderness or edema.     Neurological: She is alert and oriented to person, place, and time. No cranial nerve deficit or sensory deficit. GCS score is 15. GCS eye subscore is 4. GCS verbal subscore is 5. GCS motor subscore is 6.   Right-sided weakness in right upper and right lower extremity when  compared to left side.     5+ strength in upper and lower extremities  Sensation to light touch intact in upper and lower extremities  Sensation intact in all 3 quadrants of trigeminal nerve  Face symmetric with symmetric eyebrow raise and symmetric smile  Tongue midline       Skin: Skin is warm and dry. Capillary refill takes less than 2 seconds.   Psychiatric: She has a normal mood and affect.         ED Course   Procedures  Labs Reviewed - No data to display       Imaging Results              CT Cervical Spine Without Contrast (Final result)  Result time 02/08/25 07:41:50      Final result by Octavio Crawford MD (02/08/25 07:41:50)                   Impression:      CT head:    1. When compared to prior head CT performed 02/08/2025, 00:20 hours, no short-term change in appearance of relatively small volume recent subarachnoid hemorrhage centered about the posterolateral left temporoparietal cerebral convexity.  Additional more focal area of extra-axial hemorrhage about the high anterior right frontal lobe measuring the order of 6 mm also favored subarachnoid.  Overall, no definite new or enlarging areas of intracranial hemorrhage are suggested.  2. Focal high right anterior scalp hematoma measuring 26 mm in AP dimension. Posttraumatic changes of the scalp are additionally noted in right frontal, temporal, and supraorbital/preseptal periorbital soft tissues.  No associated skull fracture.  CT cervical spine:    1. No acute cervical spine fracture.  2. Degenerative findings, as discussed.      Electronically signed by: Octavio Crawford  Date:    02/08/2025  Time:    07:41               Narrative:    EXAMINATION:  CT HEAD WITHOUT CONTRAST; CT CERVICAL SPINE WITHOUT CONTRAST    CLINICAL HISTORY:  Subdural hemorrhage;; Polytrauma, blunt;    TECHNIQUE:  Low dose axial images were obtained through the head and cervical spine.  Coronal and sagittal reformations were also performed. Contrast was not  administered.    COMPARISON:  Head CT performed 02/08/2025, 00:20 hours    FINDINGS:  Head:    Blood: When compared to prior head CT performed 02/08/2025, 00:20 hours, no short-term change in appearance of relatively small volume recent subarachnoid hemorrhage centered about the posterolateral left temporoparietal cerebral convexity.  Additional more focal area of extra-axial hemorrhage about the high anterior right frontal lobe measuring the order of 6 mm also favored subarachnoid.  Overall, no definite new or enlarging areas of intracranial hemorrhage are suggested.    Parenchyma: No definite loss of gray-white differentiation to suggest acute or subacute transcortical infarct. Redemonstrated remote infarct corresponding to the left GLORIA territory.  Remote lacunar type infarct left basal ganglia.  Generalized pattern of age-related parenchymal volume loss is noted elsewhere.  Few additional areas of nonspecific white matter hypoattenuation may reflect sequela of vessel ischemic disease.    Ventricles/Extra-axial spaces: As above.  Basal cisterns appear patent.  No evidence of hydrocephalus.    Vessels: Moderate atherosclerotic calcifications.    Orbits: Unremarkable.    Scalp: Focal high right anterior scalp hematoma measuring 26 mm in AP dimension.  Posttraumatic changes of the scalp are additionally noted in right frontal, temporal, and supraorbital/preseptal periorbital soft tissues    Skull: There are no depressed skull fractures or destructive bone lesions.    Sinuses and mastoids: Essentially clear.    Other findings: None    Cervical spine:    Fractures: No acute fractures    Alignment: There is no significant vertebral subluxation  Atlanto-axial and atlanto-occipital joints: Atlanto-axial and atlanto-occipital intervals are not widened.  Facet joints: There is no traumatic facet joint widening.  Vertebral bodies: Degenerative endplate change.  Discs: Disc osteophyte complex formation.  Spinal canal and  foraminal narrowing: Although CT does not optimally evaluate the soft tissue contents of the spinal canal and foramina, no critical stenosis is suggested.    At C5-6, there is moderate to severe narrowing of the central and right lateral spinal canal and at least moderate right foraminal narrowing.    At C6-7, there is moderate narrowing of the central and right lateral spinal canal likely least moderate right foraminal narrowing  Paraspinal soft tissues: Unremarkable.    Upper Lungs:No definite acute findings.  Biapical pleuroparenchymal scarring.  Calcified granuloma.                                       CT Head Without Contrast (Final result)  Result time 02/08/25 07:41:50      Final result by Octavio Crawford MD (02/08/25 07:41:50)                   Impression:      CT head:    1. When compared to prior head CT performed 02/08/2025, 00:20 hours, no short-term change in appearance of relatively small volume recent subarachnoid hemorrhage centered about the posterolateral left temporoparietal cerebral convexity.  Additional more focal area of extra-axial hemorrhage about the high anterior right frontal lobe measuring the order of 6 mm also favored subarachnoid.  Overall, no definite new or enlarging areas of intracranial hemorrhage are suggested.  2. Focal high right anterior scalp hematoma measuring 26 mm in AP dimension. Posttraumatic changes of the scalp are additionally noted in right frontal, temporal, and supraorbital/preseptal periorbital soft tissues.  No associated skull fracture.  CT cervical spine:    1. No acute cervical spine fracture.  2. Degenerative findings, as discussed.      Electronically signed by: Octavio Crawford  Date:    02/08/2025  Time:    07:41               Narrative:    EXAMINATION:  CT HEAD WITHOUT CONTRAST; CT CERVICAL SPINE WITHOUT CONTRAST    CLINICAL HISTORY:  Subdural hemorrhage;; Polytrauma, blunt;    TECHNIQUE:  Low dose axial images were obtained through the head and  cervical spine.  Coronal and sagittal reformations were also performed. Contrast was not administered.    COMPARISON:  Head CT performed 02/08/2025, 00:20 hours    FINDINGS:  Head:    Blood: When compared to prior head CT performed 02/08/2025, 00:20 hours, no short-term change in appearance of relatively small volume recent subarachnoid hemorrhage centered about the posterolateral left temporoparietal cerebral convexity.  Additional more focal area of extra-axial hemorrhage about the high anterior right frontal lobe measuring the order of 6 mm also favored subarachnoid.  Overall, no definite new or enlarging areas of intracranial hemorrhage are suggested.    Parenchyma: No definite loss of gray-white differentiation to suggest acute or subacute transcortical infarct. Redemonstrated remote infarct corresponding to the left GLORIA territory.  Remote lacunar type infarct left basal ganglia.  Generalized pattern of age-related parenchymal volume loss is noted elsewhere.  Few additional areas of nonspecific white matter hypoattenuation may reflect sequela of vessel ischemic disease.    Ventricles/Extra-axial spaces: As above.  Basal cisterns appear patent.  No evidence of hydrocephalus.    Vessels: Moderate atherosclerotic calcifications.    Orbits: Unremarkable.    Scalp: Focal high right anterior scalp hematoma measuring 26 mm in AP dimension.  Posttraumatic changes of the scalp are additionally noted in right frontal, temporal, and supraorbital/preseptal periorbital soft tissues    Skull: There are no depressed skull fractures or destructive bone lesions.    Sinuses and mastoids: Essentially clear.    Other findings: None    Cervical spine:    Fractures: No acute fractures    Alignment: There is no significant vertebral subluxation  Atlanto-axial and atlanto-occipital joints: Atlanto-axial and atlanto-occipital intervals are not widened.  Facet joints: There is no traumatic facet joint widening.  Vertebral bodies:  Degenerative endplate change.  Discs: Disc osteophyte complex formation.  Spinal canal and foraminal narrowing: Although CT does not optimally evaluate the soft tissue contents of the spinal canal and foramina, no critical stenosis is suggested.    At C5-6, there is moderate to severe narrowing of the central and right lateral spinal canal and at least moderate right foraminal narrowing.    At C6-7, there is moderate narrowing of the central and right lateral spinal canal likely least moderate right foraminal narrowing  Paraspinal soft tissues: Unremarkable.    Upper Lungs:No definite acute findings.  Biapical pleuroparenchymal scarring.  Calcified granuloma.                                       Medications   losartan tablet 50 mg (50 mg Oral Given 2/8/25 1251)   amLODIPine tablet 10 mg (10 mg Oral Given 2/8/25 1146)   metoprolol tartrate (LOPRESSOR) tablet 25 mg (25 mg Oral Given 2/8/25 1146)   levETIRAcetam tablet 500 mg (500 mg Oral Given 2/8/25 1251)   acetaminophen tablet 650 mg (650 mg Oral Given 2/8/25 1251)     Medical Decision Making  Lisa Hendricks is a 65 y.o. female with a past medical history of CVA w/ residual right sided weakness presenting to the ED with suspected subdural. History and physical exam as above. Initial vital signs stable and non-actionable. Initial work-up to include: Imaging (CT Head,),    Repeat CT imaging showing no significant changes size of subdural hemorrhage.  Neuro exam WNL.  On exam there are no signs pain, swelling or abrasions on her extremities.  No tenderness to palpation of her extremities or hips.  Low suspicion for injuries requiring additional imaging.  Neurosurgery consulted who do not believe there is any indication for emergent neurosurgical intervention.  Recommended that she be started on Keppra 500 b.i.d..  Head of bed over 30°.  Staying off of aspirin and Plavix until follow up in 2 weeks.  And also maintaining her blood pressure below 160.  When attempting to  remove patient off nifedipine drip and place on some of her home meds her blood pressure went above 160.  Started on losartan.  Blood pressure returned to a systolic of the 150s.  Given fluctuant blood pressure plan is to admit to Hospital Medicine for blood pressure management in the setting of subdural hemorrhage.  Discussed with patient who is in agreement.  Questions answered.          Risk  OTC drugs.  Prescription drug management.  Decision regarding hospitalization.               ED Course as of 02/08/25 1430   Sat Feb 08, 2025   0959 Went to assess patient, satting 98% on RA on my assessment. Lungs CTAB. Breathing comfortably. No cough. [MB]      ED Course User Index  [MB] Casimiro Bright MD                           Clinical Impression:  Final diagnoses:  [I62.9] Intracranial hemorrhage          ED Disposition Condition    Admit                 Casimiro Bright MD  Resident  02/08/25 1430

## 2025-02-08 NOTE — HPI
Ms Hendricks is a 65F w/ hx ASA/Plavix for stroke years ago presenting after mechanical ground level fall transfer from OSH with small focal R SDH without significant mass effect. Grossly neurologically intact on bedside examination. Holding ASA/Plavix. No reversal required. CT Csp without acute fracture

## 2025-02-08 NOTE — HPI
66 yo F pmh of CVA w/ R hemiparesis (wheelchair at home)(DAPT), insulin dependent DM2 (7.6%), HTN who presented following a fall as a transfer from OSH. Found to have a small focal R SDH w/o mass effect.     Pt states she had a new slippery mattress topper causing an increase in elevation. She had difficulty getting into bed and fell after slipping from her bedside handrail. She fell onto tile. She did not have a loss consciousness. She was on the floor for 20 minutes. She has had falls in the past approx 3 years ago after her stroke. She denied lightheadedness, dizziness, fevers, cough, chest pain. She states she feels symptoms of hypoglycemia under 130  but she stresses she did not feel symptoms when she fell. She reports excellent compliance with medications with home health support. BP at home after medications 135/140 systolic. Denies confusion, vision changes, headache, worsening weakness, new joint pains.     At home she takes aspart 15-20 units at breakfast, Jardiance at noon, 20-25 units at dinner. She had been taken off long acting insulin prior her stroke per endocrinology. Poor response with GLP-1's, nausea w/ metformin.     In the ED  Hypertensive with a high of 170/89. She was afebrile and HR wnl. Glucose initially 353. Leukocytosis 17, Hb 14.2, PT/PTT/INR/platelets wnl. Cr 0.9 UA +4 glucose +1 ketones, rare yeast 3 squam. CT head with no short-term change in appearance of relatively small volume recent subarachnoid hemorrhage centered about the posterolateral left temporoparietal cerebral convexity and anterior scalp hematoma measuring 26 mm. CT spine no acute fracture. She was given amlodipine, lopressor, losartan, keprra. Was on a cardine drip initially, now off. Grossly neurologically intact on bedside examination by neurosurgery and no acute neurosurgical intervention planned.

## 2025-02-08 NOTE — ASSESSMENT & PLAN NOTE
Patient's blood pressure range in the last 24 hours was: BP  Min: 121/58  Max: 174/77. The patient's inpatient anti-hypertensive regimen is listed below: Previously hypertensive to 170's in ED started on cardine drip briefly.     Vitals:    02/08/25 1131 02/08/25 1147 02/08/25 1202 02/08/25 1206   BP: (!) 137/55 128/60 (!) 167/70 (!) 159/70    02/08/25 1231 02/08/25 1251 02/08/25 1300 02/08/25 1331   BP: (!) 170/59 (!) 170/89 (!) 168/78 (!) 148/67    02/08/25 1402 02/08/25 1432   BP: (!) 156/70 (!) 165/73      Current Antihypertensives  losartan tablet 50 mg, Daily, Oral  amLODIPine tablet 10 mg, Daily, Oral  metoprolol tartrate (LOPRESSOR) tablet 25 mg, 2 times daily with meals, Oral    Plan  - BP is controlled, no changes needed to their regimen  - SBP <160

## 2025-02-08 NOTE — ED NOTES
Assumed care of patient Lisa Hendricks, a 65 y.o. female     Triage note:  Chief Complaint   Patient presents with    Transfer     Sent from [facility] for neurosurgery eval of subarachnoid. Arrives on Cardene at 5mg/hr.     Review of patient's allergies indicates:   Allergen Reactions    Levemir [insulin detemir] Other (See Comments)     Sob rash to arms and chest  abd cramping diarrhea    Lisinopril Swelling    Metformin      Other reaction(s): severe gi upset    Shellfish containing products      Other^asthma    Tramadol hcl      Other reaction(s): anaphylaxis    Losartan Other (See Comments)     Muscle cramp

## 2025-02-08 NOTE — ED PROVIDER NOTES
Encounter Date: 2025       History     Chief Complaint   Patient presents with    Head Injury     Pt to ED with injury to R side of head after slipping and hitting head on 2x4 trying to get into bed. Hematoma noted to R forehead. C/o headache.        Head Injury   The incident occurred just prior to arrival. She came to the ER via by ambulance. The injury mechanism was a fall. She lost consciousness for a period of seconds. There was no blood loss. The quality of the pain is described as throbbing. The pain is at a severity of 5/10. The pain has been constant since the injury. Pertinent negatives include no numbness, no blurred vision, no tinnitus, no disorientation, no weakness and no memory loss. She was found Conscious by EMS personnel. She has tried nothing for the symptoms. The treatment provided no relief.     Review of patient's allergies indicates:   Allergen Reactions    Levemir [insulin detemir] Other (See Comments)     Sob rash to arms and chest  abd cramping diarrhea    Lisinopril Swelling    Metformin      Other reaction(s): severe gi upset    Shellfish containing products      Other^asthma    Tramadol hcl      Other reaction(s): anaphylaxis    Losartan Other (See Comments)     Muscle cramp     Past Medical History:   Diagnosis Date    Acute cystitis without hematuria 10/29/2020    Asthma     Cirrhosis of liver without mention of alcohol     CVA (cerebral vascular accident) 2021    Diabetes mellitus, type 2     Gout, unspecified     Hyperlipidemia     Hypertension     Obesity, unspecified     Unspecified vitamin D deficiency     Uterine cancer      Past Surgical History:   Procedure Laterality Date     SECTION  , ,  1985     x3    CHOLECYSTECTOMY  2011    COLONOSCOPY N/A 2017    Procedure: COLONOSCOPY;  Surgeon: Luis Bogran-Reyes, MD;  Location: [facility];  Service: Endoscopy;  Laterality: N/A;    HYSTERECTOMY      for uterine cancer    OOPHORECTOMY       Family  History   Problem Relation Name Age of Onset    Alzheimer's disease Mother      Stroke Mother      Hypertension Mother      Heart attack Father      Hypertension Father      No Known Problems Sister 0     No Known Problems Daughter 1     Breast cancer Maternal Aunt      No Known Problems Maternal Uncle      No Known Problems Paternal Aunt      No Known Problems Paternal Uncle      Diabetes Maternal Grandmother      No Known Problems Maternal Grandfather      No Known Problems Paternal Grandmother      No Known Problems Paternal Grandfather      No Known Problems Other      Ovarian cancer Neg Hx      BRCA 1/2 Neg Hx       Social History     Tobacco Use    Smoking status: Former     Current packs/day: 0.00     Types: Cigarettes     Quit date: 1979     Years since quittin.0     Passive exposure: Past    Smokeless tobacco: Never   Substance Use Topics    Alcohol use: No    Drug use: Never     Review of Systems   HENT:  Negative for tinnitus.    Eyes:  Negative for blurred vision.   Neurological:  Negative for weakness and numbness.   Psychiatric/Behavioral:  Negative for memory loss.    All other systems reviewed and are negative.      Physical Exam     Initial Vitals [25 0006]   BP Pulse Resp Temp SpO2   (!) 172/79 (!) 117 18 97.9 °F (36.6 °C) 98 %      MAP       --         Physical Exam    Nursing note and vitals reviewed.  Constitutional: Vital signs are normal. She appears well-developed and well-nourished. She is not diaphoretic.  Non-toxic appearance. She does not have a sickly appearance.   Morbid obesity   HENT:   Head: Normocephalic. Head is with contusion.       Right Ear: Hearing, tympanic membrane, external ear and ear canal normal.   Left Ear: Tympanic membrane, external ear and ear canal normal. Mouth/Throat: Uvula is midline, oropharynx is clear and moist and mucous membranes are normal.   Large 4 x 4 cm hematoma   Eyes: Conjunctivae, EOM and lids are normal. Pupils are equal, round, and  reactive to light. Lids are everted and swept, no foreign bodies found.   Neck: Trachea normal and phonation normal. Neck supple.   Normal range of motion.   Full passive range of motion without pain.     Cardiovascular:  Normal rate, regular rhythm, normal heart sounds, intact distal pulses and normal pulses.           Pulmonary/Chest: No respiratory distress. She has no wheezes. She has no rhonchi. She has no rales. She exhibits no tenderness.   Abdominal: Abdomen is soft. Bowel sounds are normal. There is no abdominal tenderness.   Musculoskeletal:      Cervical back: Normal, full passive range of motion without pain, normal range of motion and neck supple.      Thoracic back: Normal.      Lumbar back: Normal.     Neurological: She is alert and oriented to person, place, and time. She has normal strength. No cranial nerve deficit or sensory deficit.   Skin: Skin is intact.   Psychiatric: She has a normal mood and affect. Her speech is normal and behavior is normal.         ED Course   Critical Care    Date/Time: 2/8/2025 2:30 AM    Performed by: Fran Jessica Jr., MD  Authorized by: Fran Jessica Jr., MD  Direct patient critical care time: 15 minutes  Additional history critical care time: 15 minutes  Ordering / reviewing critical care time: 15 minutes  Documentation critical care time: 15 minutes  Consulting other physicians critical care time: 15 minutes  Total critical care time (exclusive of procedural time) : 75 minutes  Critical care was necessary to treat or prevent imminent or life-threatening deterioration of the following conditions: cardiac failure, trauma, sepsis, metabolic crisis, dehydration, circulatory failure, endocrine crisis, renal failure, shock, toxidrome, respiratory failure, hepatic failure and CNS failure or compromise.  Critical care was time spent personally by me on the following activities: discussions with consultants, interpretation of cardiac output measurements, evaluation of  patient's response to treatment, examination of patient, pulse oximetry, ordering and review of radiographic studies, obtaining history from patient or surrogate, ordering and performing treatments and interventions, ordering and review of laboratory studies, re-evaluation of patient's condition and review of old charts.        Labs Reviewed   CBC W/ AUTO DIFFERENTIAL - Abnormal       Result Value    WBC 17.36 (*)     RBC 4.71      Hemoglobin 14.2      Hematocrit 43.8      MCV 93      MCH 30.1      MCHC 32.4      RDW 12.4      Platelets 272      MPV 11.4      Immature Granulocytes 0.9 (*)     Gran # (ANC) 13.7 (*)     Immature Grans (Abs) 0.15 (*)     Lymph # 2.1      Mono # 1.1 (*)     Eos # 0.2      Baso # 0.08      nRBC 0      Gran % 78.8 (*)     Lymph % 12.2 (*)     Mono % 6.3      Eosinophil % 1.3      Basophil % 0.5      Differential Method Automated     COMPREHENSIVE METABOLIC PANEL - Abnormal    Sodium 139      Potassium 4.1      Chloride 108      CO2 19 (*)     Glucose 353 (*)     BUN 15      Creatinine 0.9      Calcium 9.0      Total Protein 7.6      Albumin 3.9      Total Bilirubin 0.2      Alkaline Phosphatase 129      AST 23      ALT 20      eGFR >60.0      Anion Gap 12     URINALYSIS, REFLEX TO URINE CULTURE - Abnormal    Specimen UA Urine, Unspecified      Color, UA Yellow      Appearance, UA Clear      pH, UA 5.0      Specific Gravity, UA 1.005      Protein, UA Negative      Glucose, UA 4+ (*)     Ketones, UA 1+ (*)     Bilirubin (UA) Negative      Occult Blood UA Negative      Nitrite, UA Negative      Urobilinogen, UA Negative      Leukocytes, UA Negative      Narrative:     Preferred Collection Type->Urine, Clean Catch  Specimen Source->Urine   POCT GLUCOSE - Abnormal    POCT Glucose 304 (*)    APTT    aPTT 24.4     PROTIME-INR    Prothrombin Time 10.7      INR 1.0     URINALYSIS MICROSCOPIC   POCT GLUCOSE MONITORING CONTINUOUS     EKG Readings: (Independently Interpreted)   Rhythm: Sinus  Tachycardia. Heart Rate: 106. Ectopy: No Ectopy. Conduction: Normal. ST Segments: Normal ST Segments. Clinical Impression: Sinus Tachycardia       Imaging Results              CT Head Without Contrast (In process)                      Medications   sodium chloride 0.9% bolus 1,000 mL 1,000 mL (1,000 mLs Intravenous New Bag 2/8/25 0202)   ketorolac injection 30 mg (30 mg Intramuscular Given 2/8/25 0011)   labetalol 20 mg/4 mL (5 mg/mL) IV syring (20 mg Intravenous Given 2/8/25 0118)   insulin regular injection 5 Units 0.05 mL (5 Units Intravenous Given 2/8/25 0203)     Medical Decision Making  Amount and/or Complexity of Data Reviewed  Labs: ordered.  Radiology: ordered.    Risk  OTC drugs.  Prescription drug management.                          Medical Decision Making:   Differential Diagnosis:   Subdural, subarachnoid, contusion, hematoma  Clinical Tests:   Lab Tests: Ordered and Reviewed  The following lab test(s) were unremarkable: CBC and CMP  Radiological Study: Ordered and Reviewed  Medical Tests: Ordered and Reviewed  ED Management:  Patient has noted subdural and subarachnoid hemorrhage.  No midline shift.  Patient denies any complaints.  Will transfer for neurosurgery coverage             Clinical Impression:  Final diagnoses:  [W19.XXXA] Fall  [S06.5XAA] Subdural hematoma (Primary)  [I60.9] Subarachnoid bleed          ED Disposition Condition    Transfer to Another Facility Stable                Fran Jessica Jr., MD  02/08/25 0109       Fran Jessica Jr., MD  02/08/25 0110       Fran Jessica Jr., MD  02/08/25 0113       Fran Jessica Jr., MD  02/08/25 0230

## 2025-02-08 NOTE — CONSULTS
[facility] - Emergency Dept  Neurosurgery  Consult Note    Inpatient consult to Neurosurgery  Consult performed by: Jus Bar MD  Consult ordered by: Shae Jordan MD        Subjective:     Chief Complaint/Reason for Admission: fall    History of Present Illness: Ms Hendricks is a 65F w/ hx ASA/Plavix for stroke years ago presenting after mechanical ground level fall transfer from OSH with small focal R SDH without significant mass effect. Grossly neurologically intact on bedside examination. Holding ASA/Plavix. No reversal required. CT Csp without acute fracture    (Not in a hospital admission)      Review of patient's allergies indicates:   Allergen Reactions    Levemir [insulin detemir] Other (See Comments)     Sob rash to arms and chest  abd cramping diarrhea    Lisinopril Swelling    Metformin      Other reaction(s): severe gi upset    Shellfish containing products      Other^asthma    Tramadol hcl      Other reaction(s): anaphylaxis    Losartan Other (See Comments)     Muscle cramp       Past Medical History:   Diagnosis Date    Acute cystitis without hematuria 10/29/2020    Asthma     Cirrhosis of liver without mention of alcohol     CVA (cerebral vascular accident) 2021    Diabetes mellitus, type 2     Gout, unspecified     Hyperlipidemia     Hypertension     Obesity, unspecified     Unspecified vitamin D deficiency     Uterine cancer      Past Surgical History:   Procedure Laterality Date     SECTION  , ,  1985     x3    CHOLECYSTECTOMY  2011    COLONOSCOPY N/A 2017    Procedure: COLONOSCOPY;  Surgeon: Luis Bogran-Reyes, MD;  Location: [facility];  Service: Endoscopy;  Laterality: N/A;    HYSTERECTOMY      for uterine cancer    OOPHORECTOMY       Family History       Problem Relation (Age of Onset)    Alzheimer's disease Mother    Breast cancer Maternal Aunt    Diabetes Maternal Grandmother    Heart attack Father    Hypertension Mother, Father    No Known  Problems Sister, Daughter, Maternal Uncle, Paternal Aunt, Paternal Uncle, Maternal Grandfather, Paternal Grandmother, Paternal Grandfather, Other    Stroke Mother          Tobacco Use    Smoking status: Former     Current packs/day: 0.00     Types: Cigarettes     Quit date: 1979     Years since quittin.0     Passive exposure: Past    Smokeless tobacco: Never   Substance and Sexual Activity    Alcohol use: No    Drug use: Never    Sexual activity: Not Currently     Partners: Male     Birth control/protection: Surgical     Review of Systems  Negative except as above  Objective:     Weight: 101.6 kg (224 lb)  Body mass index is 38.45 kg/m².  Vital Signs (Most Recent):  Temp: 98.2 °F (36.8 °C) (25)  Pulse: 60 (25)  Resp: 15 (25)  BP: (!) 123/59 (25)  SpO2: (!) 94 % (25) Vital Signs (24h Range):  Temp:  [97.9 °F (36.6 °C)-98.2 °F (36.8 °C)] 98.2 °F (36.8 °C)  Pulse:  [] 60  Resp:  [13-21] 15  SpO2:  [92 %-98 %] 94 %  BP: (121-174)/(50-79) 123/59                         Female External Urinary Catheter w/ Suction 25 06 (Active)   Skin no redness;no breakdown 25          Physical Exam     GENERAL: resting comfortably  HEENT: NCAT, PERRL, mucous membranes moist  NECK: supple, trachea midline  CV: normal capillary refill  PULM: aerating well, symmetric expansion, no distress  ABD: soft, NT, ND  EXT: no c/c/e     NEURO:     GCS 15 E4V5M6  AAO x 3  CN II-XII grossly intact  Fc x 4 antigravity  SILT     No drift or dysmetria        Neurosurgery Physical Exam    Significant Labs:  Recent Labs   Lab 25   *      K 4.1      CO2 19*   BUN 15   CREATININE 0.9   CALCIUM 9.0     Recent Labs   Lab 25   WBC 17.36*   HGB 14.2   HCT 43.8        Recent Labs   Lab 25  0110   INR 1.0   APTT 24.4     Microbiology Results (last 7 days)       ** No results found for the last 168 hours. **          All  pertinent labs from the last 24 hours have been reviewed.    Significant Diagnostics:  I have reviewed all pertinent imaging results/findings within the past 24 hours.  I have reviewed and interpreted all pertinent imaging results/findings within the past 24 hours.  CT Head Without Contrast    Result Date: 2/8/2025  Small of volume areas of extra-axial hemorrhage, probably subarachnoid, in the right frontal and left occipital regions.  No mass effect. Findings were included on preliminary report sent by [facility]. Electronically signed by: Vishal aGrcia MD Date:    02/08/2025 Time:    09:14    CT Head Without Contrast    Result Date: 2/8/2025  CT head: 1. When compared to prior head CT performed 02/08/2025, 00:20 hours, no short-term change in appearance of relatively small volume recent subarachnoid hemorrhage centered about the posterolateral left temporoparietal cerebral convexity.  Additional more focal area of extra-axial hemorrhage about the high anterior right frontal lobe measuring the order of 6 mm also favored subarachnoid.  Overall, no definite new or enlarging areas of intracranial hemorrhage are suggested. 2. Focal high right anterior scalp hematoma measuring 26 mm in AP dimension. Posttraumatic changes of the scalp are additionally noted in right frontal, temporal, and supraorbital/preseptal periorbital soft tissues.  No associated skull fracture. CT cervical spine: 1. No acute cervical spine fracture. 2. Degenerative findings, as discussed. Electronically signed by: Octavio Crawford Date:    02/08/2025 Time:    07:41    CT Cervical Spine Without Contrast    Result Date: 2/8/2025  CT head: 1. When compared to prior head CT performed 02/08/2025, 00:20 hours, no short-term change in appearance of relatively small volume recent subarachnoid hemorrhage centered about the posterolateral left temporoparietal cerebral convexity.  Additional more focal area of extra-axial hemorrhage about the high  anterior right frontal lobe measuring the order of 6 mm also favored subarachnoid.  Overall, no definite new or enlarging areas of intracranial hemorrhage are suggested. 2. Focal high right anterior scalp hematoma measuring 26 mm in AP dimension. Posttraumatic changes of the scalp are additionally noted in right frontal, temporal, and supraorbital/preseptal periorbital soft tissues.  No associated skull fracture. CT cervical spine: 1. No acute cervical spine fracture. 2. Degenerative findings, as discussed. Electronically signed by: Octavio Crawford Date:    02/08/2025 Time:    07:41     Assessment/Plan:     SDH (subdural hematoma)  Ms Hendricks is a 65F w/ hx ASA/Plavix for stroke years ago presenting after mechanical ground level fall transfer from OSH with small focal R SDH without significant mass effect. Grossly neurologically intact on bedside examination. Holding ASA/Plavix. No reversal required. CT Csp without acute fracture    Plan:  --All labs and diagnostics reviewed  --Rpx CTH stable  --Hold ASA/Plavix until outpatient follow up 2 weeks with repeat CTH  --SBP <160   --Na >135  --Keppra 500 BID x7d  --HOB >30  --Okay for diet; DVT ppx okay 24 hrs after repeat scan  --No further inpatient workup from nsgy perspective; no acute neurosurgical intervention    Dispo: per ED; will follow up in 2 weeks outpatient with repeat CTH          Jus Bar MD  Neurosurgery  [facility] - Emergency Dept

## 2025-02-08 NOTE — ED TRIAGE NOTES
Pt arrives via ems from [facility] for SAH with nicardipine infusing to the left PIV. Pt reports she was attempting to get out of bed when she fell and hit her head on a board. Pt denies deficits at this time. States she has residual deficits from a previous stroke on the right side. Denies ha at this time but states it comes and goes.

## 2025-02-08 NOTE — ASSESSMENT & PLAN NOTE
Patient's hemorrhage is due to trauma/laceration, this bleeding is associated with an anticoagulant, the anticoagulant is Select Anticoagulant(s): Aspirin l and an antiplatelet agent, the antiplatelet agent is Select Antiplatelet Agent(s): Clopidogrel. Patients most recent Hgb, Hct, platelets, and INR are listed below.     NGSY consulted. Neurologically intact. CT head no short-term change in appearance of relatively small volume recent subarachnoid hemorrhage centered about the posterolateral left temporoparietal cerebral convexity. Previous CVA and difficulty ambulating contributing to fall.   Recent Labs     02/08/25  0110   HGB 14.2   HCT 43.8      INR 1.0     Plan  - Will trend hemoglobin/hematocrit Daily  - Will monitor and correct any coagulation defects  - Will transfuse if Hgb is <7g/dl (<8g/dl in cases of active ACS) or if patient has rapid bleeding leading to hemodynamic instability  - Hold ASA/Plavix until outpatient follow up 2 weeks with repeat CTH  --SBP <160   --Na >135  --Keppra 500 BID x7d  --HOB >30  --Okay for diet; DVT ppx okay 24 hrs after repeat scan  --No further inpatient workup from nsgy perspective; no acute neurosurgical intervention

## 2025-02-08 NOTE — ASSESSMENT & PLAN NOTE
"Patient's FSGs are uncontrolled due to hyperglycemia on current medication regimen.  Last A1c reviewed-   Lab Results   Component Value Date    HGBA1C 7.6 (H) 03/20/2024     Most recent fingerstick glucose reviewed-   Recent Labs   Lab 02/08/25  0204 02/08/25  0301   POCTGLUCOSE 304* 243*     Current correctional scale  Low  Maintain anti-hyperglycemic dose as follows-   Antihyperglycemics (From admission, onward)      Start     Stop Route Frequency Ordered    02/08/25 1645  insulin aspart U-100 pen 10 Units         -- SubQ 3 times daily with meals 02/08/25 1510    02/08/25 1555  insulin aspart U-100 pen 0-5 Units         -- SubQ Before meals & nightly PRN 02/08/25 1457          Hold Oral hypoglycemics while patient is in the hospital. Pt states does not take long acting insulin at home, she is unsure why. On chart review d/c my endocrinologist. Appears to have an allergy on chart review "Sob rash to arms and chest abd cramping diarrhea "    At home she takes 15-20 at breakfast, Jardiance at noon, 20-25 units at dinner. She had been taken off long acting insulin prior her stroke per endocrinology. Poor response with GLP-1's. Currently with good appetite.         "

## 2025-02-08 NOTE — SUBJECTIVE & OBJECTIVE
Past Medical History:   Diagnosis Date    Acute cystitis without hematuria 10/29/2020    Asthma     Cirrhosis of liver without mention of alcohol     CVA (cerebral vascular accident) 2021    Diabetes mellitus, type 2     Gout, unspecified     Hyperlipidemia     Hypertension     Obesity, unspecified     Unspecified vitamin D deficiency     Uterine cancer        Past Surgical History:   Procedure Laterality Date     SECTION  , ,  1985     x3    CHOLECYSTECTOMY  2011    COLONOSCOPY N/A 2017    Procedure: COLONOSCOPY;  Surgeon: Luis Bogran-Reyes, MD;  Location: [facility];  Service: Endoscopy;  Laterality: N/A;    HYSTERECTOMY      for uterine cancer    OOPHORECTOMY         Review of patient's allergies indicates:   Allergen Reactions    Levemir [insulin detemir] Other (See Comments)     Sob rash to arms and chest  abd cramping diarrhea    Lisinopril Swelling    Metformin      Other reaction(s): severe gi upset    Shellfish containing products      Other^asthma    Tramadol hcl      Other reaction(s): anaphylaxis    Losartan Other (See Comments)     Muscle cramp       Current Facility-Administered Medications on File Prior to Encounter   Medication    [COMPLETED] insulin regular injection 5 Units 0.05 mL    [COMPLETED] ketorolac injection 30 mg    [COMPLETED] labetalol 20 mg/4 mL (5 mg/mL) IV syring    [COMPLETED] sodium chloride 0.9% bolus 1,000 mL 1,000 mL    [DISCONTINUED] niCARdipine 40 mg/200 mL (0.2 mg/mL) infusion     Current Outpatient Medications on File Prior to Encounter   Medication Sig    albuterol (PROVENTIL/VENTOLIN HFA) 90 mcg/actuation inhaler Inhale 2 puffs into the lungs every 6 (six) hours as needed for Wheezing. Rescue    amitriptyline (ELAVIL) 25 MG tablet Take 25 mg by mouth every evening.    amLODIPine (NORVASC) 10 MG tablet TAKE 1 TABLET(10 MG) BY MOUTH EVERY DAY    aspirin (ECOTRIN) 81 MG EC tablet Take 81 mg by mouth once daily.    azilsartan medoxomiL  "(EDARBI) 80 mg Tab Take 80 mg by mouth once daily.    blood sugar diagnostic Strp 1 strip by Misc.(Non-Drug; Combo Route) route 3 (three) times daily.    blood-glucose meter,continuous (DEXCOM G7 ) Misc Use to check glucose with sensors    blood-glucose sensor (DEXCOM G7 SENSOR) Meli 1 Device by Misc.(Non-Drug; Combo Route) route every 10 days.    clopidogreL (PLAVIX) 75 mg tablet Take 1 tablet (75 mg total) by mouth once daily.    ezetimibe (ZETIA) 10 mg tablet TAKE 1 TABLET(10 MG) BY MOUTH EVERY DAY    gabapentin (NEURONTIN) 300 MG capsule TAKE 1 CAPSULE(300 MG) BY MOUTH EVERY EVENING    incontinence pad, liner, disp Pads 2 each by Misc.(Non-Drug; Combo Route) route 2 (two) times a day.    insulin aspart U-100 (NOVOLOG) 100 unit/mL (3 mL) InPn pen Inject 30 Units into the skin 3 (three) times daily with meals.    insulin needles, disposable, 32 x 1/4 " Ndle 1 Device by Misc.(Non-Drug; Combo Route) route 4 (four) times daily.    JARDIANCE 25 mg tablet Take 1 tablet (25 mg total) by mouth once daily.    LANCETS MISC 1 Act by Misc.(Non-Drug; Combo Route) route 3 (three) times daily.    loratadine (CLARITIN) 10 mg tablet 1 tablet.    metoprolol tartrate (LOPRESSOR) 25 MG tablet Take 1 tablet (25 mg total) by mouth 2 (two) times daily with meals.    polyethylene glycol (GLYCOLAX) 17 gram PwPk Take by mouth.     Family History       Problem Relation (Age of Onset)    Alzheimer's disease Mother    Breast cancer Maternal Aunt    Diabetes Maternal Grandmother    Heart attack Father    Hypertension Mother, Father    No Known Problems Sister, Daughter, Maternal Uncle, Paternal Aunt, Paternal Uncle, Maternal Grandfather, Paternal Grandmother, Paternal Grandfather, Other    Stroke Mother          Tobacco Use    Smoking status: Former     Current packs/day: 0.00     Types: Cigarettes     Quit date: 1979     Years since quittin.0     Passive exposure: Past    Smokeless tobacco: Never   Substance and Sexual " Activity    Alcohol use: No    Drug use: Never    Sexual activity: Not Currently     Partners: Male     Birth control/protection: Surgical     Review of Systems  Objective:     Vital Signs (Most Recent):  Temp: 98.2 °F (36.8 °C) (02/08/25 0712)  Pulse: 69 (02/08/25 1432)  Resp: 18 (02/08/25 1432)  BP: (!) 165/73 (02/08/25 1432)  SpO2: 97 % (02/08/25 1432) Vital Signs (24h Range):  Temp:  [97.9 °F (36.6 °C)-98.2 °F (36.8 °C)] 98.2 °F (36.8 °C)  Pulse:  [] 69  Resp:  [11-23] 18  SpO2:  [92 %-98 %] 97 %  BP: (121-174)/(50-89) 165/73     Weight: 101.6 kg (224 lb)  Body mass index is 38.45 kg/m².     Physical Exam  Constitutional:       General: She is not in acute distress.  HENT:      Head:      Comments: Right sided frontal-temporal hematoma, not actively bleeding   Eyes:      General: No scleral icterus.        Right eye: No discharge.         Left eye: No discharge.      Extraocular Movements: Extraocular movements intact.      Conjunctiva/sclera: Conjunctivae normal.      Pupils: Pupils are equal, round, and reactive to light.   Cardiovascular:      Rate and Rhythm: Normal rate and regular rhythm.      Pulses: Normal pulses.      Heart sounds: Normal heart sounds.   Pulmonary:      Effort: Pulmonary effort is normal.      Breath sounds: Normal breath sounds.   Abdominal:      General: There is no distension.   Musculoskeletal:         General: No swelling, tenderness or deformity.      Right lower leg: Edema (trace) present.      Left lower leg: Edema present.   Skin:     General: Skin is warm and dry.      Capillary Refill: Capillary refill takes less than 2 seconds.      Findings: No bruising.   Neurological:      Mental Status: She is alert and oriented to person, place, and time. Mental status is at baseline.      Motor: Weakness (chronic R sided weakness) present.              CRANIAL NERVES     CN III, IV, VI   Pupils are equal, round, and reactive to light.       Significant Labs: All pertinent labs  within the past 24 hours have been reviewed.  Recent Lab Results  (Last 5 results in the past 24 hours)        02/08/25  0301   02/08/25  0204   02/08/25  0145   02/08/25  0112   02/08/25  0110        Albumin         3.9       ALP         129       ALT         20       Anion Gap         12       Appearance, UA     Clear           PTT         24.4  Comment: Refer to local heparin nomogram for intensity/dose specific   therapeutic   range.         AST         23       Bacteria, UA     Rare           Baso #         0.08       Basophil %         0.5       Bilirubin (UA)     Negative           BILIRUBIN TOTAL         0.2  Comment: For infants and newborns, interpretation of results should be based  on gestational age, weight and in agreement with clinical  observations.    Premature Infant recommended reference ranges:  Up to 24 hours.............<8.0 mg/dL  Up to 48 hours............<12.0 mg/dL  3-5 days..................<15.0 mg/dL  6-29 days.................<15.0 mg/dL         BUN         15       Calcium         9.0       Chloride         108       CO2         19       Color, UA     Yellow           Creatinine         0.9       Differential Method         Automated       eGFR         >60.0       Eos #         0.2       Eos %         1.3       Glucose         353       Glucose, UA     4+           Gran # (ANC)         13.7       Gran %         78.8       Hematocrit         43.8       Hemoglobin         14.2       Immature Grans (Abs)         0.15  Comment: Mild elevation in immature granulocytes is non specific and   can be seen in a variety of conditions including stress response,   acute inflammation, trauma and pregnancy. Correlation with other   laboratory and clinical findings is essential.         Immature Granulocytes         0.9       INR         1.0  Comment: Coumadin Therapy:  2.0 - 3.0 for INR for all indicators except mechanical heart valves  and antiphospholipid syndromes which should use 2.5 - 3.5.          Ketones, UA     1+           Leukocyte Esterase, UA     Negative           Lymph #         2.1       Lymph %         12.2       MCH         30.1       MCHC         32.4       MCV         93       Microscopic Comment     SEE COMMENT  Comment: Other formed elements not mentioned in the report are not   present in the microscopic examination.              Mono #         1.1       Mono %         6.3       MPV         11.4       NITRITE UA     Negative           nRBC         0       Blood, UA     Negative           QRS Duration       66         [facility] QTC Calculation       448         pH, UA     5.0           Platelet Count         272       POCT Glucose 243   304             Potassium         4.1       PROTEIN TOTAL         7.6       Protein, UA     Negative  Comment: Recommend a 24 hour urine protein or a urine   protein/creatinine ratio if globulin induced proteinuria is  clinically suspected.             PT         10.7       RBC         4.71       RBC, UA     0           RDW         12.4       Sodium         139       Spec Grav UA     1.005           Specimen UA     Urine, Unspecified           Squam Epithel, UA     3           UROBILINOGEN UA     Negative           WBC, UA     15           WBC         17.36       Yeast, UA     Rare                                  Significant Imaging: I have reviewed all pertinent imaging results/findings within the past 24 hours.

## 2025-02-08 NOTE — H&P
[facility] - Emergency Dept  [facility] Medicine  History & Physical    Patient Name: Lisa Hendricks  MRN: [id number]  Patient Class: IP- Inpatient  Admission Date: 2/8/2025  Attending Physician: Joe Baird MD   Primary Care Provider: Zainab Francis NP      Patient information was obtained from patient and ER records.     Subjective:     Principal Problem:Subarachnoid hemorrhage following injury, no loss of consciousness    Chief Complaint: Subarachnoid hemorrhage   Chief Complaint   Patient presents with    Transfer     Sent from [facility] for neurosurgery eval of subarachnoid. Arrives on Cardene at 5mg/hr.        HPI: 66 yo F pmh of CVA w/ R hemiparesis (wheelchair at home)(DAPT), insulin dependent DM2 (7.6%), HTN who presented following a fall as a transfer from OSH. Found to have a small focal R SDH w/o mass effect.     Pt states she had a new slippery mattress topper causing an increase in elevation. She had difficulty getting into bed and fell after slipping from her bedside handrail. She fell onto tile. She did not have a loss consciousness. She was on the floor for 20 minutes. She has had falls in the past approx 3 years ago after her stroke. She denied lightheadedness, dizziness, fevers, cough, chest pain. She states she feels symptoms of hypoglycemia under 130  but she stresses she did not feel symptoms when she fell. She reports excellent compliance with medications with home health support. BP at home after medications 135/140 systolic. Denies confusion, vision changes, headache, worsening weakness, new joint pains.     At home she takes aspart 15-20 units at breakfast, Jardiance at noon, 20-25 units at dinner. She had been taken off long acting insulin prior her stroke per endocrinology. Poor response with GLP-1's, nausea w/ metformin.     In the ED  Hypertensive with a high of 170/89. She was afebrile and HR wnl. Glucose initially 353. Leukocytosis 17, Hb 14.2, PT/PTT/INR/platelets wnl. Cr  0.9 UA +4 glucose +1 ketones, rare yeast 3 squam. CT head with no short-term change in appearance of relatively small volume recent subarachnoid hemorrhage centered about the posterolateral left temporoparietal cerebral convexity and anterior scalp hematoma measuring 26 mm. CT spine no acute fracture. She was given amlodipine, lopressor, losartan, keprra. Was on a cardine drip initially, now off. Grossly neurologically intact on bedside examination by neurosurgery and no acute neurosurgical intervention planned.    Past Medical History:   Diagnosis Date    Acute cystitis without hematuria 10/29/2020    Asthma     Cirrhosis of liver without mention of alcohol     CVA (cerebral vascular accident) 2021    Diabetes mellitus, type 2     Gout, unspecified     Hyperlipidemia     Hypertension     Obesity, unspecified     Unspecified vitamin D deficiency     Uterine cancer        Past Surgical History:   Procedure Laterality Date     SECTION  , ,  1985     x3    CHOLECYSTECTOMY  2011    COLONOSCOPY N/A 2017    Procedure: COLONOSCOPY;  Surgeon: Luis Bogran-Reyes, MD;  Location: [facility];  Service: Endoscopy;  Laterality: N/A;    HYSTERECTOMY      for uterine cancer    OOPHORECTOMY         Review of patient's allergies indicates:   Allergen Reactions    Levemir [insulin detemir] Other (See Comments)     Sob rash to arms and chest  abd cramping diarrhea    Lisinopril Swelling    Metformin      Other reaction(s): severe gi upset    Shellfish containing products      Other^asthma    Tramadol hcl      Other reaction(s): anaphylaxis    Losartan Other (See Comments)     Muscle cramp       Current Facility-Administered Medications on File Prior to Encounter   Medication    [COMPLETED] insulin regular injection 5 Units 0.05 mL    [COMPLETED] ketorolac injection 30 mg    [COMPLETED] labetalol 20 mg/4 mL (5 mg/mL) IV syring    [COMPLETED] sodium chloride 0.9% bolus 1,000 mL 1,000 mL     "[DISCONTINUED] niCARdipine 40 mg/200 mL (0.2 mg/mL) infusion     Current Outpatient Medications on File Prior to Encounter   Medication Sig    albuterol (PROVENTIL/VENTOLIN HFA) 90 mcg/actuation inhaler Inhale 2 puffs into the lungs every 6 (six) hours as needed for Wheezing. Rescue    amitriptyline (ELAVIL) 25 MG tablet Take 25 mg by mouth every evening.    amLODIPine (NORVASC) 10 MG tablet TAKE 1 TABLET(10 MG) BY MOUTH EVERY DAY    aspirin (ECOTRIN) 81 MG EC tablet Take 81 mg by mouth once daily.    azilsartan medoxomiL (EDARBI) 80 mg Tab Take 80 mg by mouth once daily.    blood sugar diagnostic Strp 1 strip by Misc.(Non-Drug; Combo Route) route 3 (three) times daily.    blood-glucose meter,continuous (DEXCOM G7 ) Misc Use to check glucose with sensors    blood-glucose sensor (DEXCOM G7 SENSOR) Meli 1 Device by Misc.(Non-Drug; Combo Route) route every 10 days.    clopidogreL (PLAVIX) 75 mg tablet Take 1 tablet (75 mg total) by mouth once daily.    ezetimibe (ZETIA) 10 mg tablet TAKE 1 TABLET(10 MG) BY MOUTH EVERY DAY    gabapentin (NEURONTIN) 300 MG capsule TAKE 1 CAPSULE(300 MG) BY MOUTH EVERY EVENING    incontinence pad, liner, disp Pads 2 each by Misc.(Non-Drug; Combo Route) route 2 (two) times a day.    insulin aspart U-100 (NOVOLOG) 100 unit/mL (3 mL) InPn pen Inject 30 Units into the skin 3 (three) times daily with meals.    insulin needles, disposable, 32 x 1/4 " Ndle 1 Device by Misc.(Non-Drug; Combo Route) route 4 (four) times daily.    JARDIANCE 25 mg tablet Take 1 tablet (25 mg total) by mouth once daily.    LANCETS MISC 1 Act by Misc.(Non-Drug; Combo Route) route 3 (three) times daily.    loratadine (CLARITIN) 10 mg tablet 1 tablet.    metoprolol tartrate (LOPRESSOR) 25 MG tablet Take 1 tablet (25 mg total) by mouth 2 (two) times daily with meals.    polyethylene glycol (GLYCOLAX) 17 gram PwPk Take by mouth.     Family History       Problem Relation (Age of Onset)    Alzheimer's disease Mother "    Breast cancer Maternal Aunt    Diabetes Maternal Grandmother    Heart attack Father    Hypertension Mother, Father    No Known Problems Sister, Daughter, Maternal Uncle, Paternal Aunt, Paternal Uncle, Maternal Grandfather, Paternal Grandmother, Paternal Grandfather, Other    Stroke Mother          Tobacco Use    Smoking status: Former     Current packs/day: 0.00     Types: Cigarettes     Quit date: 1979     Years since quittin.0     Passive exposure: Past    Smokeless tobacco: Never   Substance and Sexual Activity    Alcohol use: No    Drug use: Never    Sexual activity: Not Currently     Partners: Male     Birth control/protection: Surgical     Review of Systems  Objective:     Vital Signs (Most Recent):  Temp: 98.2 °F (36.8 °C) (25 0712)  Pulse: 69 (25 1432)  Resp: 18 (25 1432)  BP: (!) 165/73 (25 1432)  SpO2: 97 % (25 1432) Vital Signs (24h Range):  Temp:  [97.9 °F (36.6 °C)-98.2 °F (36.8 °C)] 98.2 °F (36.8 °C)  Pulse:  [] 69  Resp:  [11-23] 18  SpO2:  [92 %-98 %] 97 %  BP: (121-174)/(50-89) 165/73     Weight: 101.6 kg (224 lb)  Body mass index is 38.45 kg/m².     Physical Exam  Constitutional:       General: She is not in acute distress.  HENT:      Head:      Comments: Right sided frontal-temporal hematoma, not actively bleeding   Eyes:      General: No scleral icterus.        Right eye: No discharge.         Left eye: No discharge.      Extraocular Movements: Extraocular movements intact.      Conjunctiva/sclera: Conjunctivae normal.      Pupils: Pupils are equal, round, and reactive to light.   Cardiovascular:      Rate and Rhythm: Normal rate and regular rhythm.      Pulses: Normal pulses.      Heart sounds: Normal heart sounds.   Pulmonary:      Effort: Pulmonary effort is normal.      Breath sounds: Normal breath sounds.   Abdominal:      General: There is no distension.   Musculoskeletal:         General: No swelling, tenderness or deformity.      Right  lower leg: Edema (trace) present.      Left lower leg: Edema present.   Skin:     General: Skin is warm and dry.      Capillary Refill: Capillary refill takes less than 2 seconds.      Findings: No bruising.   Neurological:      Mental Status: She is alert and oriented to person, place, and time. Mental status is at baseline.      Motor: Weakness (chronic R sided weakness) present.              CRANIAL NERVES     CN III, IV, VI   Pupils are equal, round, and reactive to light.       Significant Labs: All pertinent labs within the past 24 hours have been reviewed.  Recent Lab Results  (Last 5 results in the past 24 hours)        02/08/25  0301   02/08/25  0204   02/08/25  0145   02/08/25  0112   02/08/25  0110        Albumin         3.9       ALP         129       ALT         20       Anion Gap         12       Appearance, UA     Clear           PTT         24.4  Comment: Refer to local heparin nomogram for intensity/dose specific   therapeutic   range.         AST         23       Bacteria, UA     Rare           Baso #         0.08       Basophil %         0.5       Bilirubin (UA)     Negative           BILIRUBIN TOTAL         0.2  Comment: For infants and newborns, interpretation of results should be based  on gestational age, weight and in agreement with clinical  observations.    Premature Infant recommended reference ranges:  Up to 24 hours.............<8.0 mg/dL  Up to 48 hours............<12.0 mg/dL  3-5 days..................<15.0 mg/dL  6-29 days.................<15.0 mg/dL         BUN         15       Calcium         9.0       Chloride         108       CO2         19       Color, UA     Yellow           Creatinine         0.9       Differential Method         Automated       eGFR         >60.0       Eos #         0.2       Eos %         1.3       Glucose         353       Glucose, UA     4+           Gran # (ANC)         13.7       Gran %         78.8       Hematocrit         43.8       Hemoglobin          14.2       Immature Grans (Abs)         0.15  Comment: Mild elevation in immature granulocytes is non specific and   can be seen in a variety of conditions including stress response,   acute inflammation, trauma and pregnancy. Correlation with other   laboratory and clinical findings is essential.         Immature Granulocytes         0.9       INR         1.0  Comment: Coumadin Therapy:  2.0 - 3.0 for INR for all indicators except mechanical heart valves  and antiphospholipid syndromes which should use 2.5 - 3.5.         Ketones, UA     1+           Leukocyte Esterase, UA     Negative           Lymph #         2.1       Lymph %         12.2       MCH         30.1       MCHC         32.4       MCV         93       Microscopic Comment     SEE COMMENT  Comment: Other formed elements not mentioned in the report are not   present in the microscopic examination.              Mono #         1.1       Mono %         6.3       MPV         11.4       NITRITE UA     Negative           nRBC         0       Blood, UA     Negative           QRS Duration       66         OHS QTC Calculation       448         pH, UA     5.0           Platelet Count         272       POCT Glucose 243   304             Potassium         4.1       PROTEIN TOTAL         7.6       Protein, UA     Negative  Comment: Recommend a 24 hour urine protein or a urine   protein/creatinine ratio if globulin induced proteinuria is  clinically suspected.             PT         10.7       RBC         4.71       RBC, UA     0           RDW         12.4       Sodium         139       Spec Grav UA     1.005           Specimen UA     Urine, Unspecified           Squam Epithel, UA     3           UROBILINOGEN UA     Negative           WBC, UA     15           WBC         17.36       Yeast, UA     Rare                                  Significant Imaging: I have reviewed all pertinent imaging results/findings within the past 24 hours.  Assessment/Plan:     * Subarachnoid  hemorrhage following injury, no loss of consciousness  Patient's hemorrhage is due to trauma/laceration, this bleeding is associated with an anticoagulant, the anticoagulant is Select Anticoagulant(s): Aspirin l and an antiplatelet agent, the antiplatelet agent is Select Antiplatelet Agent(s): Clopidogrel. Patients most recent Hgb, Hct, platelets, and INR are listed below.     NGSY consulted. Neurologically intact. CT head no short-term change in appearance of relatively small volume recent subarachnoid hemorrhage centered about the posterolateral left temporoparietal cerebral convexity. Previous CVA and difficulty ambulating contributing to fall.   Recent Labs     02/08/25  0110   HGB 14.2   HCT 43.8      INR 1.0     Plan  - Will trend hemoglobin/hematocrit Daily  - Will monitor and correct any coagulation defects  - Will transfuse if Hgb is <7g/dl (<8g/dl in cases of active ACS) or if patient has rapid bleeding leading to hemodynamic instability  - Hold ASA/Plavix until outpatient follow up 2 weeks with repeat CTH  --SBP <160   --Na >135  --Keppra 500 BID x7d  --HOB >30  --Okay for diet; DVT ppx okay 24 hrs after repeat scan  --No further inpatient workup from nsgy perspective; no acute neurosurgical intervention        Essential hypertension  Patient's blood pressure range in the last 24 hours was: BP  Min: 121/58  Max: 174/77. The patient's inpatient anti-hypertensive regimen is listed below: Previously hypertensive to 170's in ED started on cardine drip briefly.     Vitals:    02/08/25 1131 02/08/25 1147 02/08/25 1202 02/08/25 1206   BP: (!) 137/55 128/60 (!) 167/70 (!) 159/70    02/08/25 1231 02/08/25 1251 02/08/25 1300 02/08/25 1331   BP: (!) 170/59 (!) 170/89 (!) 168/78 (!) 148/67    02/08/25 1402 02/08/25 1432   BP: (!) 156/70 (!) 165/73      Current Antihypertensives  losartan tablet 50 mg, Daily, Oral  amLODIPine tablet 10 mg, Daily, Oral  metoprolol tartrate (LOPRESSOR) tablet 25 mg, 2 times daily  "with meals, Oral    Plan  - BP is controlled, no changes needed to their regimen  - SBP <160      Type 2 diabetes mellitus with microalbuminuria, with long-term current use of insulin  Patient's FSGs are uncontrolled due to hyperglycemia on current medication regimen.  Last A1c reviewed-   Lab Results   Component Value Date    HGBA1C 7.6 (H) 03/20/2024     Most recent fingerstick glucose reviewed-   Recent Labs   Lab 02/08/25  0204 02/08/25  0301   POCTGLUCOSE 304* 243*     Current correctional scale  Low  Maintain anti-hyperglycemic dose as follows-   Antihyperglycemics (From admission, onward)      Start     Stop Route Frequency Ordered    02/08/25 1645  insulin aspart U-100 pen 10 Units         -- SubQ 3 times daily with meals 02/08/25 1510    02/08/25 1555  insulin aspart U-100 pen 0-5 Units         -- SubQ Before meals & nightly PRN 02/08/25 1457          Hold Oral hypoglycemics while patient is in the hospital. Pt states does not take long acting insulin at home, she is unsure why. On chart review d/c my endocrinologist. Appears to have an allergy on chart review "Sob rash to arms and chest abd cramping diarrhea "    At home she takes 15-20 at breakfast, Jardiance at noon, 20-25 units at dinner. She had been taken off long acting insulin prior her stroke per endocrinology. Poor response with GLP-1's. Currently with good appetite.           Right hemiparesis  Likely contributor to fall. PT states her strength is at baseline following fall. Wheelchair/walker at home     - PT/OT        VTE Risk Mitigation (From admission, onward)           Ordered     IP VTE HIGH RISK PATIENT  Once         02/08/25 1457     Place sequential compression device  Until discontinued         02/08/25 1457     Reason for No Pharmacological VTE Prophylaxis  Once        Question:  Reasons:  Answer:  Risk of Bleeding  Comment:  brain bleed    02/08/25 1457                       Caio Hackett MD  Department of Hospital Medicine  [facility] " - Emergency Dept

## 2025-02-09 LAB
ALBUMIN SERPL BCP-MCNC: 3.3 G/DL (ref 3.5–5.2)
ALP SERPL-CCNC: 90 U/L (ref 40–150)
ALT SERPL W/O P-5'-P-CCNC: 19 U/L (ref 10–44)
ANION GAP SERPL CALC-SCNC: 9 MMOL/L (ref 8–16)
AST SERPL-CCNC: 18 U/L (ref 10–40)
BACTERIA UR CULT: NORMAL
BASOPHILS # BLD AUTO: 0.05 K/UL (ref 0–0.2)
BASOPHILS NFR BLD: 0.6 % (ref 0–1.9)
BILIRUB SERPL-MCNC: 0.7 MG/DL (ref 0.1–1)
BUN SERPL-MCNC: 13 MG/DL (ref 8–23)
CALCIUM SERPL-MCNC: 8.5 MG/DL (ref 8.7–10.5)
CHLORIDE SERPL-SCNC: 111 MMOL/L (ref 95–110)
CHOLEST SERPL-MCNC: 166 MG/DL (ref 120–199)
CHOLEST/HDLC SERPL: 4.7 {RATIO} (ref 2–5)
CO2 SERPL-SCNC: 19 MMOL/L (ref 23–29)
CREAT SERPL-MCNC: 0.8 MG/DL (ref 0.5–1.4)
DIFFERENTIAL METHOD BLD: ABNORMAL
EOSINOPHIL # BLD AUTO: 0.3 K/UL (ref 0–0.5)
EOSINOPHIL NFR BLD: 3.9 % (ref 0–8)
ERYTHROCYTE [DISTWIDTH] IN BLOOD BY AUTOMATED COUNT: 12.8 % (ref 11.5–14.5)
EST. GFR  (NO RACE VARIABLE): >60 ML/MIN/1.73 M^2
GLUCOSE SERPL-MCNC: 176 MG/DL (ref 70–110)
HCT VFR BLD AUTO: 40.2 % (ref 37–48.5)
HDLC SERPL-MCNC: 35 MG/DL (ref 40–75)
HDLC SERPL: 21.1 % (ref 20–50)
HGB BLD-MCNC: 12.5 G/DL (ref 12–16)
IMM GRANULOCYTES # BLD AUTO: 0.05 K/UL (ref 0–0.04)
IMM GRANULOCYTES NFR BLD AUTO: 0.6 % (ref 0–0.5)
LDLC SERPL CALC-MCNC: 83.6 MG/DL (ref 63–159)
LYMPHOCYTES # BLD AUTO: 2.2 K/UL (ref 1–4.8)
LYMPHOCYTES NFR BLD: 25.9 % (ref 18–48)
MCH RBC QN AUTO: 30.3 PG (ref 27–31)
MCHC RBC AUTO-ENTMCNC: 31.1 G/DL (ref 32–36)
MCV RBC AUTO: 98 FL (ref 82–98)
MONOCYTES # BLD AUTO: 0.7 K/UL (ref 0.3–1)
MONOCYTES NFR BLD: 8.7 % (ref 4–15)
NEUTROPHILS # BLD AUTO: 5 K/UL (ref 1.8–7.7)
NEUTROPHILS NFR BLD: 60.3 % (ref 38–73)
NONHDLC SERPL-MCNC: 131 MG/DL
NRBC BLD-RTO: 0 /100 WBC
PLATELET # BLD AUTO: 263 K/UL (ref 150–450)
PMV BLD AUTO: 11.5 FL (ref 9.2–12.9)
POCT GLUCOSE: 172 MG/DL (ref 70–110)
POCT GLUCOSE: 184 MG/DL (ref 70–110)
POCT GLUCOSE: 193 MG/DL (ref 70–110)
POCT GLUCOSE: 225 MG/DL (ref 70–110)
POTASSIUM SERPL-SCNC: 4 MMOL/L (ref 3.5–5.1)
PROT SERPL-MCNC: 6.5 G/DL (ref 6–8.4)
RBC # BLD AUTO: 4.12 M/UL (ref 4–5.4)
SODIUM SERPL-SCNC: 139 MMOL/L (ref 136–145)
TRIGL SERPL-MCNC: 237 MG/DL (ref 30–150)
WBC # BLD AUTO: 8.37 K/UL (ref 3.9–12.7)

## 2025-02-09 PROCEDURE — 80053 COMPREHEN METABOLIC PANEL: CPT

## 2025-02-09 PROCEDURE — 25000003 PHARM REV CODE 250

## 2025-02-09 PROCEDURE — 36415 COLL VENOUS BLD VENIPUNCTURE: CPT

## 2025-02-09 PROCEDURE — 85025 COMPLETE CBC W/AUTO DIFF WBC: CPT

## 2025-02-09 PROCEDURE — 63600175 PHARM REV CODE 636 W HCPCS

## 2025-02-09 PROCEDURE — 80061 LIPID PANEL: CPT

## 2025-02-09 PROCEDURE — 11000001 HC ACUTE MED/SURG PRIVATE ROOM

## 2025-02-09 RX ORDER — ASPIRIN 81 MG/1
81 TABLET ORAL DAILY
Qty: 360 TABLET | Refills: 0 | Status: SHIPPED | OUTPATIENT
Start: 2025-02-24 | End: 2026-02-24

## 2025-02-09 RX ORDER — ENOXAPARIN SODIUM 100 MG/ML
40 INJECTION SUBCUTANEOUS EVERY 24 HOURS
Status: DISCONTINUED | OUTPATIENT
Start: 2025-02-09 | End: 2025-02-10 | Stop reason: HOSPADM

## 2025-02-09 RX ORDER — INSULIN ASPART 100 [IU]/ML
13 INJECTION, SOLUTION INTRAVENOUS; SUBCUTANEOUS
Status: DISCONTINUED | OUTPATIENT
Start: 2025-02-09 | End: 2025-02-10 | Stop reason: HOSPADM

## 2025-02-09 RX ADMIN — LOSARTAN POTASSIUM 50 MG: 50 TABLET, FILM COATED ORAL at 08:02

## 2025-02-09 RX ADMIN — INSULIN ASPART 2 UNITS: 100 INJECTION, SOLUTION INTRAVENOUS; SUBCUTANEOUS at 12:02

## 2025-02-09 RX ADMIN — ACETAMINOPHEN 650 MG: 325 TABLET ORAL at 08:02

## 2025-02-09 RX ADMIN — CETIRIZINE HYDROCHLORIDE 10 MG: 5 TABLET, FILM COATED ORAL at 08:02

## 2025-02-09 RX ADMIN — METOPROLOL TARTRATE 25 MG: 25 TABLET, FILM COATED ORAL at 06:02

## 2025-02-09 RX ADMIN — AMLODIPINE BESYLATE 10 MG: 10 TABLET ORAL at 08:02

## 2025-02-09 RX ADMIN — LEVETIRACETAM 500 MG: 500 TABLET, FILM COATED ORAL at 08:02

## 2025-02-09 RX ADMIN — GABAPENTIN 300 MG: 300 CAPSULE ORAL at 08:02

## 2025-02-09 RX ADMIN — METOPROLOL TARTRATE 25 MG: 25 TABLET, FILM COATED ORAL at 08:02

## 2025-02-09 RX ADMIN — INSULIN ASPART 10 UNITS: 100 INJECTION, SOLUTION INTRAVENOUS; SUBCUTANEOUS at 12:02

## 2025-02-09 RX ADMIN — INSULIN ASPART 13 UNITS: 100 INJECTION, SOLUTION INTRAVENOUS; SUBCUTANEOUS at 05:02

## 2025-02-09 RX ADMIN — INSULIN ASPART 10 UNITS: 100 INJECTION, SOLUTION INTRAVENOUS; SUBCUTANEOUS at 08:02

## 2025-02-09 NOTE — PLAN OF CARE
Problem: Skin Injury Risk Increased  Goal: Skin Health and Integrity  Outcome: Progressing     Problem: Adult Inpatient Plan of Care  Goal: Plan of Care Review  Outcome: Progressing  Goal: Patient-Specific Goal (Individualized)  Outcome: Progressing  Goal: Absence of Hospital-Acquired Illness or Injury  Outcome: Progressing  Goal: Optimal Comfort and Wellbeing  Outcome: Progressing   POC and meds reviewed with patient, all needs addressed.

## 2025-02-09 NOTE — SUBJECTIVE & OBJECTIVE
Interval History: No new neurological changes. Mild body aches from fall relieved with tylenol. Pt worried she may have worse balance with aches wants to be evaluated by PT.OT before discharge. May pursue SNF.     Review of Systems  Objective:     Vital Signs (Most Recent):  Temp: 98 °F (36.7 °C) (02/09/25 1231)  Pulse: 65 (02/09/25 1231)  Resp: 16 (02/08/25 2132)  BP: (!) 122/54 (02/09/25 1231)  SpO2: (!) 94 % (02/09/25 1231) Vital Signs (24h Range):  Temp:  [98 °F (36.7 °C)-98.6 °F (37 °C)] 98 °F (36.7 °C)  Pulse:  [55-73] 65  Resp:  [16-21] 16  SpO2:  [94 %-97 %] 94 %  BP: (107-169)/(54-95) 122/54     Weight: 96.2 kg (212 lb 1.3 oz)  Body mass index is 36.4 kg/m².    Intake/Output Summary (Last 24 hours) at 2/9/2025 1512  Last data filed at 2/9/2025 1235  Gross per 24 hour   Intake --   Output 700 ml   Net -700 ml         Physical Exam  Constitutional:       General: She is not in acute distress.  HENT:      Head:      Comments: Right sided frontal-temporal hematoma, not actively bleeding   Eyes:      General: No scleral icterus.        Right eye: No discharge.         Left eye: No discharge.      Extraocular Movements: Extraocular movements intact.      Conjunctiva/sclera: Conjunctivae normal.      Pupils: Pupils are equal, round, and reactive to light.   Cardiovascular:      Rate and Rhythm: Normal rate and regular rhythm.      Pulses: Normal pulses.      Heart sounds: Normal heart sounds.   Pulmonary:      Effort: Pulmonary effort is normal.      Breath sounds: Normal breath sounds.   Abdominal:      General: There is no distension.   Musculoskeletal:         General: No swelling, tenderness or deformity.      Right lower leg: Edema (trace) present.      Left lower leg: Edema present.   Skin:     General: Skin is warm and dry.      Capillary Refill: Capillary refill takes less than 2 seconds.      Findings: No bruising.   Neurological:      Mental Status: She is alert and oriented to person, place, and time.  Mental status is at baseline.      Motor: Weakness (chronic R sided weakness) present.             Significant Labs: All pertinent labs within the past 24 hours have been reviewed.  Recent Lab Results  (Last 5 results in the past 24 hours)        02/09/25  1215   02/09/25  0802   02/09/25  0524   02/08/25  1821   02/08/25  1526        Albumin     3.3           ALP     90           ALT     19           Anion Gap     9           AST     18           Baso #     0.05           Basophil %     0.6           BILIRUBIN TOTAL     0.7  Comment: For infants and newborns, interpretation of results should be based  on gestational age, weight and in agreement with clinical  observations.    Premature Infant recommended reference ranges:  Up to 24 hours.............<8.0 mg/dL  Up to 48 hours............<12.0 mg/dL  3-5 days..................<15.0 mg/dL  6-29 days.................<15.0 mg/dL             BUN     13           Calcium     8.5           Chloride     111           Cholesterol Total     166  Comment: The National Cholesterol Education Program (NCEP) has set the  following guidelines (reference ranges) for Cholesterol:  Optimal.....................<200 mg/dL  Borderline High.............200-239 mg/dL  High........................> or = 240 mg/dL             CO2     19           Creatinine     0.8           Differential Method     Automated           eGFR     >60.0           Eos #     0.3           Eos %     3.9           Estimated Avg Glucose         217       Glucose     176           Gran # (ANC)     5.0           Gran %     60.3           HDL     35  Comment: The National Cholesterol Education Program (NCEP) has set the  following guidelines (reference values) for HDL Cholesterol:  Low...............<40 mg/dL  Optimal...........>60 mg/dL             HDL/Cholesterol Ratio     21.1           Hematocrit     40.2           Hemoglobin     12.5           Hemoglobin A1C External         9.2  Comment: ADA Screening  Guidelines:  5.7-6.4%  Consistent with prediabetes  >or=6.5%  Consistent with diabetes    High levels of fetal hemoglobin interfere with the HbA1C  assay. Heterozygous hemoglobin variants (HbS, HgC, etc)do  not significantly interfere with this assay.   However, presence of multiple variants may affect accuracy.         Immature Grans (Abs)     0.05  Comment: Mild elevation in immature granulocytes is non specific and   can be seen in a variety of conditions including stress response,   acute inflammation, trauma and pregnancy. Correlation with other   laboratory and clinical findings is essential.             Immature Granulocytes     0.6           LDL Cholesterol     83.6  Comment: The National Cholesterol Education Program (NCEP) has set the  following guidelines (reference values) for LDL Cholesterol:  Optimal.......................<130 mg/dL  Borderline High...............130-159 mg/dL  High..........................160-189 mg/dL  Very High.....................>190 mg/dL             Lymph #     2.2           Lymph %     25.9           MCH     30.3           MCHC     31.1           MCV     98           Mono #     0.7           Mono %     8.7           MPV     11.5           Non-HDL Cholesterol     131  Comment: Risk category and Non-HDL cholesterol goals:  Coronary heart disease (CHD)or equivalent (10-year risk of CHD >20%):  Non-HDL cholesterol goal     <130 mg/dL  Two or more CHD risk factors and 10-year risk of CHD <= 20%:  Non-HDL cholesterol goal     <160 mg/dL  0 to 1 CHD risk factor:  Non-HDL cholesterol goal     <190 mg/dL             nRBC     0           Platelet Count     263           POCT Glucose 225   193     185         Potassium     4.0           PROTEIN TOTAL     6.5           RBC     4.12           RDW     12.8           Sodium     139           Total Cholesterol/HDL Ratio     4.7           Triglycerides     237  Comment: The National Cholesterol Education Program (NCEP) has set the  following  guidelines (reference values) for triglycerides:  Normal......................<150 mg/dL  Borderline High.............150-199 mg/dL  High........................200-499 mg/dL             WBC     8.37                                  Significant Imaging: I have reviewed all pertinent imaging results/findings within the past 24 hours.

## 2025-02-09 NOTE — PLAN OF CARE
Tan[facility] Poole - Neurosurgery ([facility])      HOME HEALTH ORDERS  FACE TO FACE ENCOUNTER    Patient Name: Lisa Hendricks  YOB: 1960    PCP: Zainab Francis NP   PCP Address: [address] [place] [address] Dr. LAMB [address] [place] [address]  PCP Phone Number: [phone]  PCP Fax: [phone]    Encounter Date: 2/8/25    Admit to Home Health    Diagnoses:  Active Hospital Problems    Diagnosis  POA    *Subarachnoid hemorrhage following injury, no loss of consciousness [S06.6X0A]  Yes     Priority: 1 - High    Essential hypertension [I10]  Yes     Priority: 2     Type 2 diabetes mellitus with microalbuminuria, with long-term current use of insulin [E11.29, R80.9, Z79.4]  Not Applicable     Priority: 3     Right hemiparesis [G81.91]  Yes     Priority: 4     Subdural hematoma [S06.5XAA]  Yes      Resolved Hospital Problems    Diagnosis Date Resolved POA    Subarachnoid bleed [I60.9] 02/08/2025 Unknown       Follow Up Appointments:  Future Appointments   Date Time Provider Department Center   2/12/2025 10:30 AM Kinga Macias PA-C [facility]   2/20/2025 10:15 AM Zainab Francis NP [facility] PRICAR [facility]       Allergies:  Review of patient's allergies indicates:   Allergen Reactions    Levemir [insulin detemir] Other (See Comments)     Sob rash to arms and chest  abd cramping diarrhea    Lisinopril Swelling    Metformin      Other reaction(s): severe gi upset    Shellfish containing products      Other^asthma    Tramadol hcl      Other reaction(s): anaphylaxis    Losartan Other (See Comments)     Muscle cramp       Medications: Review discharge medications with patient and family and provide education.    Current Facility-Administered Medications   Medication Dose Route Frequency Provider Last Rate Last Admin    acetaminophen tablet 650 mg  650 mg Oral Q4H PRN Caio Hackett MD   650 mg at 02/09/25 0820    albuterol inhaler 2 puff  2 puff Inhalation Q6H PRN Caio Hackett MD        amitriptyline tablet 25 mg   25 mg Oral Nightly PRN Caio Hackett MD        amLODIPine tablet 10 mg  10 mg Oral Daily Caio Hackett MD   10 mg at 02/09/25 0820    cetirizine tablet 10 mg  10 mg Oral Daily Caio Hackett MD   10 mg at 02/09/25 0816    dextrose 50% injection 12.5 g  12.5 g Intravenous PRN Caio Hackett MD        dextrose 50% injection 25 g  25 g Intravenous PRN Caio Hackett MD        enoxaparin injection 40 mg  40 mg Subcutaneous Q24H (prophylaxis, 1700) aCio Hackett MD        gabapentin capsule 300 mg  300 mg Oral QHS Caio Hackett MD   300 mg at 02/08/25 2017    glucagon (human recombinant) injection 1 mg  1 mg Intramuscular PRN Caio Hackett MD        glucose chewable tablet 16 g  16 g Oral PRN Caio Hackett MD        glucose chewable tablet 24 g  24 g Oral PRN Caio Hackett MD        insulin aspart U-100 pen 0-5 Units  0-5 Units Subcutaneous QID (AC + HS) PRN Caio Hackett MD        insulin aspart U-100 pen 10 Units  10 Units Subcutaneous TIDWM Caio Hackett MD   10 Units at 02/09/25 0830    levETIRAcetam tablet 500 mg  500 mg Oral BID Caio Hackett MD   500 mg at 02/09/25 0830    losartan tablet 50 mg  50 mg Oral Daily Casimiro Bright MD   50 mg at 02/09/25 0816    metoprolol tartrate (LOPRESSOR) tablet 25 mg  25 mg Oral BID WM Caio Hackett MD   25 mg at 02/09/25 0815    naloxone 0.4 mg/mL injection 0.02 mg  0.02 mg Intravenous PRN Caio Hackett MD        polyethylene glycol packet 17 g  17 g Oral Daily Caio Hackett MD   17 g at 02/08/25 1527    sodium chloride 0.9% flush 10 mL  10 mL Intravenous Q12H PRN Caio Hackett MD            Medication List        START taking these medications      aspirin 81 MG EC tablet  Commonly known as: ECOTRIN  Take 1 tablet (81 mg total) by mouth once daily. HOLD until CT scan reviewed in 2 weeks  Start taking on: February 24, 2025            CONTINUE taking these medications      albuterol 90 mcg/actuation inhaler  Commonly known as: PROVENTIL/VENTOLIN  "HFA  Inhale 2 puffs into the lungs every 6 (six) hours as needed for Wheezing. Rescue     amitriptyline 25 MG tablet  Commonly known as: ELAVIL  Take 25 mg by mouth every evening.     amLODIPine 10 MG tablet  Commonly known as: NORVASC  TAKE 1 TABLET(10 MG) BY MOUTH EVERY DAY     blood sugar diagnostic Strp  1 strip by Misc.(Non-Drug; Combo Route) route 3 (three) times daily.     DEXCOM G7  Misc  Generic drug: blood-glucose meter,continuous  Use to check glucose with sensors     DEXCOM G7 SENSOR Meli  Generic drug: blood-glucose sensor  1 Device by Misc.(Non-Drug; Combo Route) route every 10 days.     EDARBI 80 mg Tab  Generic drug: azilsartan medoxomiL  Take 80 mg by mouth once daily.     ezetimibe 10 mg tablet  Commonly known as: ZETIA  TAKE 1 TABLET(10 MG) BY MOUTH EVERY DAY     gabapentin 300 MG capsule  Commonly known as: NEURONTIN  TAKE 1 CAPSULE(300 MG) BY MOUTH EVERY EVENING     incontinence pad, liner, disp Pads  2 each by Misc.(Non-Drug; Combo Route) route 2 (two) times a day.     insulin aspart U-100 100 unit/mL (3 mL) Inpn pen  Commonly known as: NovoLOG  Inject 30 Units into the skin 3 (three) times daily with meals.     JARDIANCE 25 mg tablet  Generic drug: empagliflozin  Take 1 tablet (25 mg total) by mouth once daily.     LANCETS MISC  1 Act by Misc.(Non-Drug; Combo Route) route 3 (three) times daily.     loratadine 10 mg tablet  Commonly known as: CLARITIN  1 tablet.     metoprolol tartrate 25 MG tablet  Commonly known as: LOPRESSOR  Take 1 tablet (25 mg total) by mouth 2 (two) times daily with meals.     pen needle, diabetic 32 gauge x 1/4" Ndle  1 Device by Misc.(Non-Drug; Combo Route) route 4 (four) times daily.     polyethylene glycol 17 gram Pwpk  Commonly known as: GLYCOLAX  Take by mouth.                I have seen and examined this patient within the last 30 days. My clinical findings that support the need for the home health skilled services and home bound status are the " following:no   Weakness/numbness causing balance and gait disturbance due to Stroke making it taxing to leave home.     Diet:   diabetic diet 2000 calorie    Labs:  N/a    Referrals/ Consults  Physical Therapy to evaluate and treat. Evaluate for home safety and equipment needs; Establish/upgrade home exercise program. Perform / instruct on therapeutic exercises, gait training, transfer training, and Range of Motion.  Occupational Therapy to evaluate and treat. Evaluate home environment for safety and equipment needs. Perform/Instruct on transfers, ADL training, ROM, and therapeutic exercises.  Aide to provide assistance with personal care, ADLs, and vital signs.    Activities:   activity as tolerated    Nursing:   Agency to admit patient within 24 hours of hospital discharge unless specified on physician order or at patient request    SN to complete comprehensive assessment including routine vital signs. Instruct on disease process and s/s of complications to report to MD. Review/verify medication list sent home with the patient at time of discharge  and instruct patient/caregiver as needed. Frequency may be adjusted depending on start of care date.     Skilled nurse to perform up to 3 visits PRN for symptoms related to diagnosis    Notify MD if SBP > 160 or < 90; DBP > 90 or < 50; HR > 120 or < 50; Temp > 101; O2 < 88%;     Ok to schedule additional visits based on staff availability and patient request on consecutive days within the home health episode.    When multiple disciplines ordered:    Start of Care occurs on Sunday - Wednesday schedule remaining discipline evaluations as ordered on separate consecutive days following the start of care.    Thursday SOC -schedule subsequent evaluations Friday and Monday the following week.     Friday - Saturday SOC - schedule subsequent discipline evaluations on consecutive days starting Monday of the following week.      Miscellaneous   Diabetic Care:   SN to perform and  educate Diabetic management with blood glucose monitoring:, Fingerstick blood sugar AC and HS, and Report CBG < 60 or > 350 to physician.    Please ensure patient does not take aspirin until she has received a CT scan and has discussed with her provider that results are safe to resume aspirin. *    Home Health Aide:  Physical Therapy Three times weekly, Occupational Therapy Three times weekly, and Home Health Aide Three times weekly and every 48 hours    Wound Care Orders  no    I certify that this patient is confined to her home and needs intermittent skilled nursing care, physical therapy, and occupational therapy.

## 2025-02-09 NOTE — ED NOTES
Assumed care of patient. Pt remains on cardiac monitor, continuous pulse ox, and cycling blood pressures. Bed placed in low locked position, side rails up x2, call bell is within reach. Will continue to monitor.         LOC: The patient is awake, alert, aware of environment with an appropriate affect. Oriented x4, speaking appropriately  APPEARANCE: Pt resting comfortably, in no acute distress, pt is clean and well groomed, clothing properly fastened  SKIN:The skin is warm and dry, color consistent with ethnicity, patient has normal skin turgor and moist mucus membranes, + bruise R side of the head  RESPIRATORY:Airway is open and patent, respirations are spontaneous, patient has a normal effort and rate, no accessory muscle use noted.  CARDIAC: Normal rate and rhythm, no peripheral edema noted, capillary refill < 3 seconds, bilateral radial pulses 2+.  ABDOMEN: Soft, non tender, non distended.   NEUROLOGIC: PERRLA, facial expression is symmetrical, baseline weakness to R arm and leg,reports baseline numbness to feet d/t neuropathy.  Follows all commands appropriately  MUSCULOSKELETAL: Baseline weakness to R arm and R leg, R hand swelling

## 2025-02-09 NOTE — PLAN OF CARE
[facility] Poole - Neurosurgery (Hospital)  Initial Discharge Assessment       Primary Care Provider: Zainab Francis NP    Admission Diagnosis: Intracranial hemorrhage [I62.9]  Chest pain [R07.9]    Admission Date: 2/8/2025  Expected Discharge Date:     Transition of Care Barriers: None    Payor: MEDICAID / Plan: [facility] COMMUNITY PLAN [id number] ([place] MEDICAID) / Product Type: Managed Medicaid /     Extended Emergency Contact Information  Primary Emergency Contact: Elizabeth Hendricks  Address: [address] [place] [address]  Home Phone: [phone]  Mobile Phone: [phone]  Relation: Friend    Discharge Plan A: Home Health  Discharge Plan B: Home with family      [facility] #[id number] - [place], [place] - [address] [facility] [address] [facility] [address] [facility]  [address] [facility] [address]  Phone: [phone] Fax: [phone]    [facility] #[id number] - [facility] [address] AT [facility] OF [facility] [address]  [address]  [facility] [address]  Phone: [phone] Fax: [phone]      Initial Assessment (most recent)       Adult Discharge Assessment - 02/09/25 1054          Discharge Assessment    Assessment Type Discharge Planning Assessment     Confirmed/corrected address, phone number and insurance Yes     Confirmed Demographics Correct on Facesheet     Source of Information patient     Does patient/caregiver understand observation status Yes     Communicated PETEY with patient/caregiver Yes;Date not available/Unable to determine     Reason For Admission Subarachnoid hemorrhage following injury, no loss of consciousness     People in Home child(caprice), adult     Facility Arrived From: [facility]     Do you expect to return to your current living situation? Yes     Do you have help at home or someone to help you manage your care at home? Yes     Who are your caregiver(s) and their phone number(s)? Virgil Sin [phone]      Prior to hospitilization cognitive status: Alert/Oriented     Current cognitive status: Alert/Oriented     Walking or Climbing Stairs Difficulty yes     Walking or Climbing Stairs ambulation difficulty, requires equipment     Dressing/Bathing Difficulty yes     Dressing/Bathing bathing difficulty, requires equipment     Equipment Currently Used at Home walker, rolling;wheelchair;shower chair     Readmission within 30 days? No     Patient currently being followed by outpatient case management? No     Do you currently have service(s) that help you manage your care at home? Yes     Name and Contact number of agency [facility]     Is the pt/caregiver preference to resume services with current agency Yes     Do you take prescription medications? Yes     Do you have prescription coverage? Yes     Coverage Payor: MEDICAID - [facility] COMMUNITY [facility] ([place] MEDICAID) -     Do you have any problems affording any of your prescribed medications? No     Is the patient taking medications as prescribed? yes     How do you get to doctors appointments? family or friend will provide     Are you on dialysis? No     Do you take coumadin? No     Discharge Plan A Home Health     Discharge Plan B Home with family     DME Needed Upon Discharge  other (see comments)   TBD    Discharge Plan discussed with: Patient     Transition of Care Barriers None                   Spoke to patient. Patient lives at home with her son Virgil Hendricks [phone]. Post hospital stay,  patients son will be patients support person. Patient has transportation at d/c with her family. There have been no hospitalizations within the last 30 days per pt. Verified patients PCP and preferred pharmacy. Patient stated not on Coumadin and is not receiving dialysis. All questions answered regarding case management/ discharge planning , patient verbalized understanding. Discharge booklet with SW contact information given to patient.    Discharge Plan A and Plan B  have been determined by review of patient's clinical status, future medical and therapeutic needs, and coverage/benefits for post-acute care in coordination with multidisciplinary team members.      TENISHA Dean, MSW  Case Management  [facility]

## 2025-02-09 NOTE — HOSPITAL COURSE
65 year old woman with HTN, T2DM on insulin, and CVA w/ R hemiparesis that presented after a mechanical fall getting into bed. She was found to have a small focal R SDH w/o mass effect. No neuro deficits noted. CT C-spine negative. Repeat CT head stable. [facility] consulted and recommended holding ASA until follow up with them in clinic in 2 weeks. Blood pressure within goal of SBP <160. Continued home amlodipine, losartan and lopressor.  Started on Keppra x 7 days per [facility]. Insurance unable to provide home health. Outpatient PT/OT recommended.

## 2025-02-09 NOTE — PROGRESS NOTES
Tanvir Poole - Neurosurgery ([facility])  [facility] Medicine  Progress Note    Patient Name: Lisa Hendricks  MRN: [id number]  Patient Class: IP- Inpatient   Admission Date: 2/8/2025  Length of Stay: 1 days  Attending Physician: Joe Baird MD  Primary Care Provider: Zainab Francis NP        Subjective     Principal Problem:Subarachnoid hemorrhage following injury, no loss of consciousness        HPI:  64 yo F pmh of CVA w/ R hemiparesis (wheelchair at home)(DAPT), insulin dependent DM2 (7.6%), HTN who presented following a fall as a transfer from OSH. Found to have a small focal R SDH w/o mass effect.     Pt states she had a new slippery mattress topper causing an increase in elevation. She had difficulty getting into bed and fell after slipping from her bedside handrail. She fell onto tile. She did not have a loss consciousness. She was on the floor for 20 minutes. She has had falls in the past approx 3 years ago after her stroke. She denied lightheadedness, dizziness, fevers, cough, chest pain. She states she feels symptoms of hypoglycemia under 130  but she stresses she did not feel symptoms when she fell. She reports excellent compliance with medications with home health support. BP at home after medications 135/140 systolic. Denies confusion, vision changes, headache, worsening weakness, new joint pains.     At home she takes aspart 15-20 units at breakfast, Jardiance at noon, 20-25 units at dinner. She had been taken off long acting insulin prior her stroke per endocrinology. Poor response with GLP-1's, nausea w/ metformin.     In the ED  Hypertensive with a high of 170/89. She was afebrile and HR wnl. Glucose initially 353. Leukocytosis 17, Hb 14.2, PT/PTT/INR/platelets wnl. Cr 0.9 UA +4 glucose +1 ketones, rare yeast 3 squam. CT head with no short-term change in appearance of relatively small volume recent subarachnoid hemorrhage centered about the posterolateral left temporoparietal cerebral convexity and  anterior scalp hematoma measuring 26 mm. CT spine no acute fracture. She was given amlodipine, lopressor, losartan, keprra. Was on a cardine drip initially, now off. Grossly neurologically intact on bedside examination by neurosurgery and no acute neurosurgical intervention planned.    Overview/Hospital Course:  Neurologically stable. BP improved on home regimen. Mild aches from fall relieved with tylenol. Discussed cessation of clopidogrel and advised to continue aspirin following CT of head in approx 2 weeks. PT/OT ordered. Repeat A1c elevated at 9.2.     Interval History: No new neurological changes. Mild body aches from fall relieved with tylenol. Pt worried she may have worse balance with aches wants to be evaluated by PT.OT before discharge. May pursue SNF.     Review of Systems  Objective:     Vital Signs (Most Recent):  Temp: 98 °F (36.7 °C) (02/09/25 1231)  Pulse: 65 (02/09/25 1231)  Resp: 16 (02/08/25 2132)  BP: (!) 122/54 (02/09/25 1231)  SpO2: (!) 94 % (02/09/25 1231) Vital Signs (24h Range):  Temp:  [98 °F (36.7 °C)-98.6 °F (37 °C)] 98 °F (36.7 °C)  Pulse:  [55-73] 65  Resp:  [16-21] 16  SpO2:  [94 %-97 %] 94 %  BP: (107-169)/(54-95) 122/54     Weight: 96.2 kg (212 lb 1.3 oz)  Body mass index is 36.4 kg/m².    Intake/Output Summary (Last 24 hours) at 2/9/2025 1512  Last data filed at 2/9/2025 1235  Gross per 24 hour   Intake --   Output 700 ml   Net -700 ml         Physical Exam  Constitutional:       General: She is not in acute distress.  HENT:      Head:      Comments: Right sided frontal-temporal hematoma, not actively bleeding   Eyes:      General: No scleral icterus.        Right eye: No discharge.         Left eye: No discharge.      Extraocular Movements: Extraocular movements intact.      Conjunctiva/sclera: Conjunctivae normal.      Pupils: Pupils are equal, round, and reactive to light.   Cardiovascular:      Rate and Rhythm: Normal rate and regular rhythm.      Pulses: Normal pulses.       Heart sounds: Normal heart sounds.   Pulmonary:      Effort: Pulmonary effort is normal.      Breath sounds: Normal breath sounds.   Abdominal:      General: There is no distension.   Musculoskeletal:         General: No swelling, tenderness or deformity.      Right lower leg: Edema (trace) present.      Left lower leg: Edema present.   Skin:     General: Skin is warm and dry.      Capillary Refill: Capillary refill takes less than 2 seconds.      Findings: No bruising.   Neurological:      Mental Status: She is alert and oriented to person, place, and time. Mental status is at baseline.      Motor: Weakness (chronic R sided weakness) present.             Significant Labs: All pertinent labs within the past 24 hours have been reviewed.  Recent Lab Results  (Last 5 results in the past 24 hours)        02/09/25  1215   02/09/25  0802   02/09/25  0524   02/08/25  1821   02/08/25  1526        Albumin     3.3           ALP     90           ALT     19           Anion Gap     9           AST     18           Baso #     0.05           Basophil %     0.6           BILIRUBIN TOTAL     0.7  Comment: For infants and newborns, interpretation of results should be based  on gestational age, weight and in agreement with clinical  observations.    Premature Infant recommended reference ranges:  Up to 24 hours.............<8.0 mg/dL  Up to 48 hours............<12.0 mg/dL  3-5 days..................<15.0 mg/dL  6-29 days.................<15.0 mg/dL             BUN     13           Calcium     8.5           Chloride     111           Cholesterol Total     166  Comment: The National Cholesterol Education Program (NCEP) has set the  following guidelines (reference ranges) for Cholesterol:  Optimal.....................<200 mg/dL  Borderline High.............200-239 mg/dL  High........................> or = 240 mg/dL             CO2     19           Creatinine     0.8           Differential Method     Automated           eGFR     >60.0            Eos #     0.3           Eos %     3.9           Estimated Avg Glucose         217       Glucose     176           Gran # (ANC)     5.0           Gran %     60.3           HDL     35  Comment: The National Cholesterol Education Program (NCEP) has set the  following guidelines (reference values) for HDL Cholesterol:  Low...............<40 mg/dL  Optimal...........>60 mg/dL             HDL/Cholesterol Ratio     21.1           Hematocrit     40.2           Hemoglobin     12.5           Hemoglobin A1C External         9.2  Comment: ADA Screening Guidelines:  5.7-6.4%  Consistent with prediabetes  >or=6.5%  Consistent with diabetes    High levels of fetal hemoglobin interfere with the HbA1C  assay. Heterozygous hemoglobin variants (HbS, HgC, etc)do  not significantly interfere with this assay.   However, presence of multiple variants may affect accuracy.         Immature Grans (Abs)     0.05  Comment: Mild elevation in immature granulocytes is non specific and   can be seen in a variety of conditions including stress response,   acute inflammation, trauma and pregnancy. Correlation with other   laboratory and clinical findings is essential.             Immature Granulocytes     0.6           LDL Cholesterol     83.6  Comment: The National Cholesterol Education Program (NCEP) has set the  following guidelines (reference values) for LDL Cholesterol:  Optimal.......................<130 mg/dL  Borderline High...............130-159 mg/dL  High..........................160-189 mg/dL  Very High.....................>190 mg/dL             Lymph #     2.2           Lymph %     25.9           MCH     30.3           MCHC     31.1           MCV     98           Mono #     0.7           Mono %     8.7           MPV     11.5           Non-HDL Cholesterol     131  Comment: Risk category and Non-HDL cholesterol goals:  Coronary heart disease (CHD)or equivalent (10-year risk of CHD >20%):  Non-HDL cholesterol goal     <130 mg/dL  Two  or more CHD risk factors and 10-year risk of CHD <= 20%:  Non-HDL cholesterol goal     <160 mg/dL  0 to 1 CHD risk factor:  Non-HDL cholesterol goal     <190 mg/dL             nRBC     0           Platelet Count     263           POCT Glucose 225   193     185         Potassium     4.0           PROTEIN TOTAL     6.5           RBC     4.12           RDW     12.8           Sodium     139           Total Cholesterol/HDL Ratio     4.7           Triglycerides     237  Comment: The National Cholesterol Education Program (NCEP) has set the  following guidelines (reference values) for triglycerides:  Normal......................<150 mg/dL  Borderline High.............150-199 mg/dL  High........................200-499 mg/dL             WBC     8.37                                  Significant Imaging: I have reviewed all pertinent imaging results/findings within the past 24 hours.    Assessment and Plan     * Subarachnoid hemorrhage following injury, no loss of consciousness  Patient's hemorrhage is due to trauma/laceration, this bleeding is associated with an anticoagulant, the anticoagulant is Select Anticoagulant(s): Aspirin l and an antiplatelet agent, the antiplatelet agent is Select Antiplatelet Agent(s): Clopidogrel. Patients most recent Hgb, Hct, platelets, and INR are listed below.     NGSY consulted. Neurologically intact. CT head no short-term change in appearance of relatively small volume recent subarachnoid hemorrhage centered about the posterolateral left temporoparietal cerebral convexity. Previous CVA and difficulty ambulating contributing to fall.   Recent Labs     02/08/25  0110 02/09/25  0524   HGB 14.2 12.5   HCT 43.8 40.2    263   INR 1.0  --        Plan  - Will trend hemoglobin/hematocrit Daily  - Will monitor and correct any coagulation defects  - Will transfuse if Hgb is <7g/dl (<8g/dl in cases of active ACS) or if patient has rapid bleeding leading to hemodynamic instability  - Hold  ASA/Plavix until outpatient follow up 2 weeks with repeat CTH  - will DC plavix from med list  --SBP <160   --Na >135  --Keppra 500 BID x7d  --HOB >30  --Okay for diet; DVT ppx okay 24 hrs after repeat scan  --No further inpatient workup from nsgy perspective; no acute neurosurgical intervention        Essential hypertension  Patient's blood pressure range in the last 24 hours was: BP  Min: 121/58  Max: 174/77. The patient's inpatient anti-hypertensive regimen is listed below: Previously hypertensive to 170's in ED started on cardine drip briefly.     Vitals:    02/08/25 1131 02/08/25 1147 02/08/25 1202 02/08/25 1206   BP: (!) 137/55 128/60 (!) 167/70 (!) 159/70    02/08/25 1231 02/08/25 1251 02/08/25 1300 02/08/25 1331   BP: (!) 170/59 (!) 170/89 (!) 168/78 (!) 148/67    02/08/25 1402 02/08/25 1432   BP: (!) 156/70 (!) 165/73      Current Antihypertensives  losartan tablet 50 mg, Daily, Oral  amLODIPine tablet 10 mg, Daily, Oral  metoprolol tartrate (LOPRESSOR) tablet 25 mg, 2 times daily with meals, Oral    Plan  - BP is controlled, no changes needed to their regimen  - SBP <160      Type 2 diabetes mellitus with microalbuminuria, with long-term current use of insulin  Patient's FSGs are uncontrolled due to hyperglycemia on current medication regimen.  Last A1c reviewed-   Lab Results   Component Value Date    HGBA1C 7.6 (H) 03/20/2024     Most recent fingerstick glucose reviewed-   Recent Labs   Lab 02/08/25  0204 02/08/25  0301   POCTGLUCOSE 304* 243*     Current correctional scale  Low  Maintain anti-hyperglycemic dose as follows-   Antihyperglycemics (From admission, onward)      Start     Stop Route Frequency Ordered    02/08/25 1645  insulin aspart U-100 pen 10 Units         -- SubQ 3 times daily with meals 02/08/25 1510    02/08/25 1555  insulin aspart U-100 pen 0-5 Units         -- SubQ Before meals & nightly PRN 02/08/25 1457          Hold Oral hypoglycemics while patient is in the hospital. Pt states  "does not take long acting insulin at home, she is unsure why. On chart review d/c my endocrinologist. Appears to have an allergy on chart review "Sob rash to arms and chest abd cramping diarrhea "    At home she takes 15-20 at breakfast, Jardiance at noon, 20-25 units at dinner. She had been taken off long acting insulin prior her stroke per endocrinology. Poor response with GLP-1's. Currently with good appetite.           Right hemiparesis  Likely contributor to fall. PT states her strength is at baseline following fall. Wheelchair/walker at home     - PT/OT        VTE Risk Mitigation (From admission, onward)           Ordered     enoxaparin injection 40 mg  Every 24 hours         02/09/25 0639     IP VTE HIGH RISK PATIENT  Once         02/08/25 1457     Place sequential compression device  Until discontinued         02/08/25 1457     Reason for No Pharmacological VTE Prophylaxis  Once        Question:  Reasons:  Answer:  Risk of Bleeding  Comment:  brain bleed    02/08/25 1457                    Discharge Planning   PETEY: 2/9/2025     Code Status: Full Code   Medical Readiness for Discharge Date: 2/9/2025  Discharge Plan A: Home Health                Please place Justification for DME        Caio Hackett MD  Department of Hospital Medicine   [facility]    "

## 2025-02-09 NOTE — PROGRESS NOTES
Pharmacist Renal Dose Adjustment Note    Lisa Hendricks is a 65 y.o. female being treated with the medication enoxaparin    Patient Data:    Vital Signs (Most Recent):  Temp: 98.6 °F (37 °C) (02/09/25 0558)  Pulse: 60 (02/09/25 0558)  Resp: 16 (02/08/25 2132)  BP: 107/65 (02/09/25 0558)  SpO2: 95 % (02/09/25 0558) Vital Signs (72h Range):  Temp:  [97.9 °F (36.6 °C)-98.6 °F (37 °C)]   Pulse:  []   Resp:  [11-23]   BP: (107-174)/(50-95)   SpO2:  [92 %-98 %]      Recent Labs   Lab 02/08/25  0110   CREATININE 0.9     Serum creatinine: 0.9 mg/dL 02/08/25 0110  Estimated creatinine clearance: 72 mL/min    Medication:enoxaparin 30mg daily will be changed to enoxaparin 40mg daily    Pharmacist's Name: Mile Oleary  Pharmacist's Extension: [phone]

## 2025-02-09 NOTE — ASSESSMENT & PLAN NOTE
Patient's hemorrhage is due to trauma/laceration, this bleeding is associated with an anticoagulant, the anticoagulant is Select Anticoagulant(s): Aspirin l and an antiplatelet agent, the antiplatelet agent is Select Antiplatelet Agent(s): Clopidogrel. Patients most recent Hgb, Hct, platelets, and INR are listed below.     NGSY consulted. Neurologically intact. CT head no short-term change in appearance of relatively small volume recent subarachnoid hemorrhage centered about the posterolateral left temporoparietal cerebral convexity. Previous CVA and difficulty ambulating contributing to fall.   Recent Labs     02/08/25  0110 02/09/25  0524   HGB 14.2 12.5   HCT 43.8 40.2    263   INR 1.0  --        Plan  - Will trend hemoglobin/hematocrit Daily  - Will monitor and correct any coagulation defects  - Will transfuse if Hgb is <7g/dl (<8g/dl in cases of active ACS) or if patient has rapid bleeding leading to hemodynamic instability  - Hold ASA/Plavix until outpatient follow up 2 weeks with repeat CTH  - will DC plavix from med list  --SBP <160   --Na >135  --Keppra 500 BID x7d  --HOB >30  --Okay for diet; DVT ppx okay 24 hrs after repeat scan  --No further inpatient workup from nsgy perspective; no acute neurosurgical intervention

## 2025-02-10 ENCOUNTER — TELEPHONE (OUTPATIENT)
Dept: NEUROSURGERY | Facility: CLINIC | Age: 65
End: 2025-02-10
Payer: MEDICAID

## 2025-02-10 VITALS
BODY MASS INDEX: 36.2 KG/M2 | SYSTOLIC BLOOD PRESSURE: 127 MMHG | HEIGHT: 64 IN | WEIGHT: 212.06 LBS | DIASTOLIC BLOOD PRESSURE: 70 MMHG | OXYGEN SATURATION: 97 % | HEART RATE: 68 BPM | RESPIRATION RATE: 16 BRPM | TEMPERATURE: 98 F

## 2025-02-10 PROBLEM — I16.1 HYPERTENSIVE EMERGENCY: Status: ACTIVE | Noted: 2025-02-10

## 2025-02-10 PROBLEM — Z86.73 HISTORY OF CVA (CEREBROVASCULAR ACCIDENT): Status: ACTIVE | Noted: 2025-02-10

## 2025-02-10 LAB
ALBUMIN SERPL BCP-MCNC: 3.2 G/DL (ref 3.5–5.2)
ALP SERPL-CCNC: 85 U/L (ref 40–150)
ALT SERPL W/O P-5'-P-CCNC: 19 U/L (ref 10–44)
ANION GAP SERPL CALC-SCNC: 9 MMOL/L (ref 8–16)
AST SERPL-CCNC: 21 U/L (ref 10–40)
BASOPHILS # BLD AUTO: 0.04 K/UL (ref 0–0.2)
BASOPHILS NFR BLD: 0.5 % (ref 0–1.9)
BILIRUB SERPL-MCNC: 0.4 MG/DL (ref 0.1–1)
BUN SERPL-MCNC: 19 MG/DL (ref 8–23)
CALCIUM SERPL-MCNC: 8.5 MG/DL (ref 8.7–10.5)
CHLORIDE SERPL-SCNC: 111 MMOL/L (ref 95–110)
CO2 SERPL-SCNC: 19 MMOL/L (ref 23–29)
CREAT SERPL-MCNC: 0.8 MG/DL (ref 0.5–1.4)
DIFFERENTIAL METHOD BLD: ABNORMAL
EOSINOPHIL # BLD AUTO: 0.4 K/UL (ref 0–0.5)
EOSINOPHIL NFR BLD: 4.4 % (ref 0–8)
ERYTHROCYTE [DISTWIDTH] IN BLOOD BY AUTOMATED COUNT: 12.7 % (ref 11.5–14.5)
EST. GFR  (NO RACE VARIABLE): >60 ML/MIN/1.73 M^2
GLUCOSE SERPL-MCNC: 174 MG/DL (ref 70–110)
HCT VFR BLD AUTO: 39.5 % (ref 37–48.5)
HGB BLD-MCNC: 12.3 G/DL (ref 12–16)
IMM GRANULOCYTES # BLD AUTO: 0.04 K/UL (ref 0–0.04)
IMM GRANULOCYTES NFR BLD AUTO: 0.5 % (ref 0–0.5)
LYMPHOCYTES # BLD AUTO: 2.1 K/UL (ref 1–4.8)
LYMPHOCYTES NFR BLD: 25.9 % (ref 18–48)
MCH RBC QN AUTO: 29.7 PG (ref 27–31)
MCHC RBC AUTO-ENTMCNC: 31.1 G/DL (ref 32–36)
MCV RBC AUTO: 95 FL (ref 82–98)
MONOCYTES # BLD AUTO: 0.6 K/UL (ref 0.3–1)
MONOCYTES NFR BLD: 7.7 % (ref 4–15)
NEUTROPHILS # BLD AUTO: 5 K/UL (ref 1.8–7.7)
NEUTROPHILS NFR BLD: 61 % (ref 38–73)
NRBC BLD-RTO: 0 /100 WBC
PLATELET # BLD AUTO: 245 K/UL (ref 150–450)
PMV BLD AUTO: 11.4 FL (ref 9.2–12.9)
POCT GLUCOSE: 167 MG/DL (ref 70–110)
POCT GLUCOSE: 183 MG/DL (ref 70–110)
POCT GLUCOSE: 197 MG/DL (ref 70–110)
POCT GLUCOSE: 198 MG/DL (ref 70–110)
POTASSIUM SERPL-SCNC: 3.8 MMOL/L (ref 3.5–5.1)
PROT SERPL-MCNC: 6.4 G/DL (ref 6–8.4)
RBC # BLD AUTO: 4.14 M/UL (ref 4–5.4)
SODIUM SERPL-SCNC: 139 MMOL/L (ref 136–145)
WBC # BLD AUTO: 8.16 K/UL (ref 3.9–12.7)

## 2025-02-10 PROCEDURE — 25000003 PHARM REV CODE 250

## 2025-02-10 PROCEDURE — 97530 THERAPEUTIC ACTIVITIES: CPT

## 2025-02-10 PROCEDURE — 97162 PT EVAL MOD COMPLEX 30 MIN: CPT

## 2025-02-10 PROCEDURE — 36415 COLL VENOUS BLD VENIPUNCTURE: CPT

## 2025-02-10 PROCEDURE — 80053 COMPREHEN METABOLIC PANEL: CPT

## 2025-02-10 PROCEDURE — 85025 COMPLETE CBC W/AUTO DIFF WBC: CPT

## 2025-02-10 PROCEDURE — 25000003 PHARM REV CODE 250: Performed by: NURSE PRACTITIONER

## 2025-02-10 PROCEDURE — 97535 SELF CARE MNGMENT TRAINING: CPT

## 2025-02-10 PROCEDURE — 97165 OT EVAL LOW COMPLEX 30 MIN: CPT

## 2025-02-10 RX ORDER — MUPIROCIN 20 MG/G
OINTMENT TOPICAL 2 TIMES DAILY
Status: DISCONTINUED | OUTPATIENT
Start: 2025-02-10 | End: 2025-02-10 | Stop reason: HOSPADM

## 2025-02-10 RX ORDER — ROSUVASTATIN CALCIUM 20 MG/1
20 TABLET, COATED ORAL DAILY
COMMUNITY
End: 2025-02-14

## 2025-02-10 RX ORDER — LEVETIRACETAM 500 MG/1
500 TABLET ORAL 2 TIMES DAILY
Qty: 10 TABLET | Refills: 0 | Status: SHIPPED | OUTPATIENT
Start: 2025-02-10 | End: 2025-02-15

## 2025-02-10 RX ADMIN — CETIRIZINE HYDROCHLORIDE 10 MG: 5 TABLET, FILM COATED ORAL at 08:02

## 2025-02-10 RX ADMIN — MUPIROCIN: 20 OINTMENT TOPICAL at 08:02

## 2025-02-10 RX ADMIN — METOPROLOL TARTRATE 25 MG: 25 TABLET, FILM COATED ORAL at 04:02

## 2025-02-10 RX ADMIN — INSULIN ASPART 13 UNITS: 100 INJECTION, SOLUTION INTRAVENOUS; SUBCUTANEOUS at 04:02

## 2025-02-10 RX ADMIN — METOPROLOL TARTRATE 25 MG: 25 TABLET, FILM COATED ORAL at 08:02

## 2025-02-10 RX ADMIN — INSULIN ASPART 13 UNITS: 100 INJECTION, SOLUTION INTRAVENOUS; SUBCUTANEOUS at 08:02

## 2025-02-10 RX ADMIN — POLYETHYLENE GLYCOL 3350 17 G: 17 POWDER, FOR SOLUTION ORAL at 08:02

## 2025-02-10 RX ADMIN — LEVETIRACETAM 500 MG: 500 TABLET, FILM COATED ORAL at 08:02

## 2025-02-10 RX ADMIN — LOSARTAN POTASSIUM 50 MG: 50 TABLET, FILM COATED ORAL at 08:02

## 2025-02-10 RX ADMIN — INSULIN ASPART 13 UNITS: 100 INJECTION, SOLUTION INTRAVENOUS; SUBCUTANEOUS at 12:02

## 2025-02-10 RX ADMIN — AMLODIPINE BESYLATE 10 MG: 10 TABLET ORAL at 08:02

## 2025-02-10 NOTE — CARE UPDATE
I have reviewed the chart of Lisa Hendricks who is hospitalized for the following:    Active Hospital Problems    Diagnosis    *Subarachnoid hemorrhage following injury, no loss of consciousness    History of CVA (cerebrovascular accident)    Hypertensive emergency     POA  Transferred on cardene        Subdural hematoma    Right hemiparesis    Type 2 diabetes mellitus with microalbuminuria, with long-term current use of insulin    Essential hypertension    Mixed hyperlipidemia        Morena Cooley NP  Unit Based ROSA

## 2025-02-10 NOTE — DISCHARGE SUMMARY
[facility] - Neurosurgery ([facility])  [facility] Medicine  Discharge Summary      Patient Name: Lisa Hendricks  MRN: [id number]  DEMETRIO: [id number]  Patient Class: IP- Inpatient  Admission Date: 2/8/2025  Hospital Length of Stay: 2 days  Discharge Date and Time:  02/10/2025 1:22 PM  Attending Physician: Joe Baird MD   Discharging Provider: Gloria Taylor DO  Primary Care Provider: Zainab Francis NP  Hospital Medicine Team: [facility] 4 Gloria Taylor DO  Primary Care Team: [facility] 4    HPI:   64 yo F pmh of CVA w/ R hemiparesis (wheelchair at home)(DAPT), insulin dependent DM2 (7.6%), HTN who presented following a fall as a transfer from OSH. Found to have a small focal R SDH w/o mass effect.     Pt states she had a new slippery mattress topper causing an increase in elevation. She had difficulty getting into bed and fell after slipping from her bedside handrail. She fell onto tile. She did not have a loss consciousness. She was on the floor for 20 minutes. She has had falls in the past approx 3 years ago after her stroke. She denied lightheadedness, dizziness, fevers, cough, chest pain. She states she feels symptoms of hypoglycemia under 130  but she stresses she did not feel symptoms when she fell. She reports excellent compliance with medications with home health support. BP at home after medications 135/140 systolic. Denies confusion, vision changes, headache, worsening weakness, new joint pains.     At home she takes aspart 15-20 units at breakfast, Jardiance at noon, 20-25 units at dinner. She had been taken off long acting insulin prior her stroke per endocrinology. Poor response with GLP-1's, nausea w/ metformin.     In the ED  Hypertensive with a high of 170/89. She was afebrile and HR wnl. Glucose initially 353. Leukocytosis 17, Hb 14.2, PT/PTT/INR/platelets wnl. Cr 0.9 UA +4 glucose +1 ketones, rare yeast 3 squam. CT head with no short-term change in appearance of relatively small volume  recent subarachnoid hemorrhage centered about the posterolateral left temporoparietal cerebral convexity and anterior scalp hematoma measuring 26 mm. CT spine no acute fracture. She was given amlodipine, lopressor, losartan, keprra. Was on a cardine drip initially, now off. Grossly neurologically intact on bedside examination by neurosurgery and no acute neurosurgical intervention planned.    * No surgery found *      Hospital Course:   65 year old woman with HTN, T2DM on insulin, and CVA w/ R hemiparesis that presented after a mechanical fall getting into bed. She was found to have a small focal R SDH w/o mass effect. No neuro deficits noted. CT C-spine negative. Repeat CT head stable. NGSY consulted and recommended holding ASA until follow up with them in clinic in 2 weeks. Blood pressure within goal of SBP <160. Continued home amlodipine, losartan and lopressor.  Started on Keppra x 7 days per NGSY. Insurance unable to provide home health. Outpatient PT/OT recommended.              Goals of Care Treatment Preferences:  Code Status: Full Code      SDOH Screening:  The patient declined to be screened for utility difficulties, food insecurity, transport difficulties, housing insecurity, and interpersonal safety, so no concerns could be identified this admission.     Consults:   Consults (From admission, onward)          Status Ordering Provider     Inpatient consult to Registered Dietitian/Nutritionist  Once        Provider:  (Not yet assigned)    Acknowledged ELE HICKS     Inpatient consult to Neurosurgery  Once        Provider:  (Not yet assigned)    Completed ARIA WINTER            * Subarachnoid hemorrhage following injury, no loss of consciousness  Patient's hemorrhage is due to trauma/laceration, this bleeding is associated with an anticoagulant, the anticoagulant is Select Anticoagulant(s): Aspirin l and an antiplatelet agent, the antiplatelet agent is Select Antiplatelet Agent(s):  Clopidogrel. Patients most recent Hgb, Hct, platelets, and INR are listed below.     NGSY consulted. Neurologically intact. CT head no short-term change in appearance of relatively small volume recent subarachnoid hemorrhage centered about the posterolateral left temporoparietal cerebral convexity. Previous CVA and difficulty ambulating contributing to fall.   Recent Labs     02/08/25  0110 02/09/25  0524   HGB 14.2 12.5   HCT 43.8 40.2    263   INR 1.0  --        Plan  - Will trend hemoglobin/hematocrit Daily  - Will monitor and correct any coagulation defects  - Will transfuse if Hgb is <7g/dl (<8g/dl in cases of active ACS) or if patient has rapid bleeding leading to hemodynamic instability  - Hold ASA/Plavix until outpatient follow up 2 weeks with repeat CTH  - will DC plavix from med list  --SBP <160   --Na >135  --Keppra 500 BID x7d  --HOB >30  --Okay for diet; DVT ppx okay 24 hrs after repeat scan  --No further inpatient workup from nsgy perspective; no acute neurosurgical intervention        Right hemiparesis  Likely contributor to fall. PT states her strength is at baseline following fall. Wheelchair/walker at home     - PT/OT      Type 2 diabetes mellitus with microalbuminuria, with long-term current use of insulin  Patient's FSGs are uncontrolled due to hyperglycemia on current medication regimen.  Last A1c reviewed-   Lab Results   Component Value Date    HGBA1C 7.6 (H) 03/20/2024     Most recent fingerstick glucose reviewed-   Recent Labs   Lab 02/08/25  0204 02/08/25  0301   POCTGLUCOSE 304* 243*     Current correctional scale  Low  Maintain anti-hyperglycemic dose as follows-   Antihyperglycemics (From admission, onward)      Start     Stop Route Frequency Ordered    02/08/25 1645  insulin aspart U-100 pen 10 Units         -- SubQ 3 times daily with meals 02/08/25 1510    02/08/25 1555  insulin aspart U-100 pen 0-5 Units         -- SubQ Before meals & nightly PRN 02/08/25 1457          Hold  "Oral hypoglycemics while patient is in the hospital. Pt states does not take long acting insulin at home, she is unsure why. On chart review d/c my endocrinologist. Appears to have an allergy on chart review "Sob rash to arms and chest abd cramping diarrhea "    At home she takes 15-20 at breakfast, Jardiance at noon, 20-25 units at dinner. She had been taken off long acting insulin prior her stroke per endocrinology. Poor response with GLP-1's. Currently with good appetite.           Essential hypertension  Patient's blood pressure range in the last 24 hours was: BP  Min: 121/58  Max: 174/77. The patient's inpatient anti-hypertensive regimen is listed below: Previously hypertensive to 170's in ED started on cardine drip briefly.     Vitals:    02/08/25 1131 02/08/25 1147 02/08/25 1202 02/08/25 1206   BP: (!) 137/55 128/60 (!) 167/70 (!) 159/70    02/08/25 1231 02/08/25 1251 02/08/25 1300 02/08/25 1331   BP: (!) 170/59 (!) 170/89 (!) 168/78 (!) 148/67    02/08/25 1402 02/08/25 1432   BP: (!) 156/70 (!) 165/73      Current Antihypertensives  losartan tablet 50 mg, Daily, Oral  amLODIPine tablet 10 mg, Daily, Oral  metoprolol tartrate (LOPRESSOR) tablet 25 mg, 2 times daily with meals, Oral    Plan  - BP is controlled, no changes needed to their regimen  - SBP <160        Final Active Diagnoses:    Diagnosis Date Noted POA    PRINCIPAL PROBLEM:  Subarachnoid hemorrhage following injury, no loss of consciousness [S06.6X0A] 02/08/2025 Yes    History of CVA (cerebrovascular accident) [Z86.73] 02/10/2025 Not Applicable    Hypertensive emergency [I16.1] 02/10/2025 Yes    Subdural hematoma [S06.5XAA] 02/08/2025 Yes    Right hemiparesis [G81.91] 03/13/2024 Yes    Type 2 diabetes mellitus with microalbuminuria, with long-term current use of insulin [E11.29, R80.9, Z79.4] 06/01/2017 Not Applicable    Essential hypertension [I10] 10/14/2015 Yes    Mixed hyperlipidemia [E78.2] 09/26/2014 Yes      Problems Resolved During this " Admission:    Diagnosis Date Noted Date Resolved POA    Subarachnoid bleed [I60.9] 02/08/2025 02/08/2025 Unknown       Discharged Condition: fair    Disposition:     Follow Up:   Follow-up Information       Zainab Francis NP. Call.    Specialty: Family Medicine  Contact information:  [facility] [address] [place] [id number]  [phone]                           Patient Instructions:      CT Head Without Contrast   Standing Status: Future Standing Exp. Date: 02/09/26     Order Specific Question Answer Comments   May the Radiologist modify the order per protocol to meet the clinical needs of the patient? Yes      Ambulatory referral/consult to Endocrinology   Standing Status: Future   Referral Priority: Routine Referral Type: Consultation   Requested Specialty: Endocrinology   Number of Visits Requested: 1     Ambulatory Referral/Consult to Physical/Occupational Therapy   Standing Status: Future   Referral Priority: Routine Referral Type: Physical Medicine   Referral Reason: Specialty Services Required   Number of Visits Requested: 1       Significant Diagnostic Studies: N/A    Pending Diagnostic Studies:       None           Medications:  Reconciled Home Medications:      Medication List        START taking these medications      aspirin 81 MG EC tablet  Commonly known as: ECOTRIN  Take 1 tablet (81 mg total) by mouth once daily. HOLD until CT scan reviewed in 2 weeks  Start taking on: February 24, 2025     levETIRAcetam 500 MG Tab  Commonly known as: KEPPRA  Take 1 tablet (500 mg total) by mouth 2 (two) times daily. for 5 days            CONTINUE taking these medications      albuterol 90 mcg/actuation inhaler  Commonly known as: PROVENTIL/VENTOLIN HFA  Inhale 2 puffs into the lungs every 6 (six) hours as needed for Wheezing. Rescue     amitriptyline 25 MG tablet  Commonly known as: ELAVIL  Take 25 mg by mouth every evening.     amLODIPine 10 MG tablet  Commonly known as: NORVASC  TAKE 1 TABLET(10 MG) BY  "MOUTH EVERY DAY     blood sugar diagnostic Strp  1 strip by Misc.(Non-Drug; Combo Route) route 3 (three) times daily.     DEXCOM G7  Misc  Generic drug: blood-glucose meter,continuous  Use to check glucose with sensors     DEXCOM G7 SENSOR Meli  Generic drug: blood-glucose sensor  1 Device by Misc.(Non-Drug; Combo Route) route every 10 days.     EDARBI 80 mg Tab  Generic drug: azilsartan medoxomiL  Take 80 mg by mouth once daily.     ezetimibe 10 mg tablet  Commonly known as: ZETIA  TAKE 1 TABLET(10 MG) BY MOUTH EVERY DAY     gabapentin 300 MG capsule  Commonly known as: NEURONTIN  TAKE 1 CAPSULE(300 MG) BY MOUTH EVERY EVENING     incontinence pad, liner, disp Pads  2 each by Misc.(Non-Drug; Combo Route) route 2 (two) times a day.     insulin aspart U-100 100 unit/mL (3 mL) Inpn pen  Commonly known as: NovoLOG  Inject 30 Units into the skin 3 (three) times daily with meals.     JARDIANCE 25 mg tablet  Generic drug: empagliflozin  Take 1 tablet (25 mg total) by mouth once daily.     LANCETS MISC  1 Act by Misc.(Non-Drug; Combo Route) route 3 (three) times daily.     loratadine 10 mg tablet  Commonly known as: CLARITIN  Take 10 mg by mouth once daily.     metoprolol tartrate 25 MG tablet  Commonly known as: LOPRESSOR  Take 1 tablet (25 mg total) by mouth 2 (two) times daily with meals.     pen needle, diabetic 32 gauge x 1/4" Ndle  1 Device by Misc.(Non-Drug; Combo Route) route 4 (four) times daily.     polyethylene glycol 17 gram Pwpk  Commonly known as: GLYCOLAX  Take 17 g by mouth once daily.     rosuvastatin 20 MG tablet  Commonly known as: CRESTOR  Take 20 mg by mouth once daily.              Indwelling Lines/Drains at time of discharge:   Lines/Drains/Airways       Drain  Duration             Female External Urinary Catheter w/ Suction 02/08/25 0629 2 days                    Time spent on the discharge of patient: 60 minutes         Gloria Taylor DO  Department of Hospital Medicine  [address] - " Neurosurgery ([facility])

## 2025-02-10 NOTE — PROGRESS NOTES
[facility] - Neurosurgery ([facility])  Adult Nutrition  Progress Note    SUMMARY       Recommendations    -- Continue Diabetic 2000 calorie/ low sodium diet as clinically indicated and tolerated  -- Encourage good intakes  -- Will order Arginaid and Glutasolve BID to promote wound healing  --Recommend MVI with minerals (zinc, vitamin A, Vitamin C) to promote healing  --Nursing, please document % po intake in flowsheets  --RD to monitor weight, po intake, skin, labs     Goals  --Maintain weight during admission  --Meet 75 -100 % po intake    Nutrition Goal Status: new  Communication of RD recs: POC  Assessment and Plan  Nutrition Problem  Increased nutrient needs (protein, vit/min)    Related to (etiology):   Increased metabolic demand of wound healing    Signs and Symptoms (as evidenced by):   Deep tissue pressure injury      Interventions/Recommendations (treatment strategy):  Collaboration with care team  Kye BID    Nutrition Diagnosis Status:   New    Malnutrition Assessment  NFPE not warranted at this time    Reason for Assessment    Reason For Assessment: consult (Deep Tissue Pressure Injury)  Diagnosis: other (see comments) (Subarachnoid hemorrhage following injury, no loss of consciousness)  General Information Comments: 66 yo F pmh of CVA w/ R hemiparesis, insulin dependent DM2, vitamin D deficiency, hyperlipidemia, and HTN who presented following a fall as a transfer from OSH. RD consulted for deep tissue pressure injury.  Glucose 353 2/8, 174 as of 2/10. At time of visit, pt noted having a good appetite. Normally has 3 meals a day prepared by a home health aid. Per weight hx/flowsheets,12 lb weight loss x 24 hrs; Pt denies noticing weight loss. 100% of lunch completed 2/10. Pt open to drinking kye to promote wound healing.      Nutrition Discharge Planning: Pending Clinical Course/ General Healthy Diet    Nutrition/Diet History    Spiritual, Cultural Beliefs, Yazdanism Practices, Values that Affect Care:  "no  Food Allergies: shellfish    Nutrition Related Social Determinants of Health: SDOH: Adequate food in home environment  Food Insecurity: Patient Declined (2/9/2025)    Hunger Vital Sign     Worried About Running Out of Food in the Last Year: Patient declined     Ran Out of Food in the Last Year: Patient declined       Anthropometrics    Height: 5' 4" (162.6 cm)  Height (inches): 64 in  Height Method: Stated  Weight: 96.2 kg (212 lb 1.3 oz)  Weight (lb): 212.08 lb  Weight Method: Bed Scale  Ideal Body Weight (IBW), Female: 120 lb  % Ideal Body Weight, Female (lb): 176.73 %  BMI (Calculated): 36.4       Lab/Procedures/Meds    Pertinent Labs Reviewed: reviewed  Pertinent Labs Comments: MCHC 31.1, Cl 111, Glu 174, Ca 8.5, Alb 3.2  Pertinent Medications Reviewed: reviewed   Pertinent Medications Comments: amlodipine, cetrizine, IV fluids w dex 50%, enoxaparin, gabapentin, insulin, levetiracetam losartan, metoprolol    Estimated/Assessed Needs    Weight Used For Calorie Calculations: 96.2 kg (212 lb 1.3 oz) (BW)  Energy Calorie Requirements (kcal): 1924 - 2405 kcal (20 - 25 kcal/kg BW)  Energy Need Method: Pendroy-St Jeor, Kcal/kg  Protein Requirements: 96 - 115 g PRO (1.0 -1.2 g PRO)  Weight Used For Protein Calculations: 96.2 kg (212 lb 1.3 oz)  Fluid Requirements (mL): per MD     RDA Method (mL): 1924  CHO Requirement: 243 - 351 g CHO (45 - 65 % kcal /day (2164))      Nutrition Prescription Ordered    Current Diet Order: Diabetic 2000 calorie/ Low Sodium    Evaluation of Received Nutrient/Fluid Intake    Energy Calories Required: meeting needs  Protein Required: meeting needs  Fluid Required: meeting needs  Tolerance: tolerating  % Intake of Estimated Energy Needs: 75 - 100 %  % Meal Intake: 75 - 100 %    Nutrition Risk    Level of Risk/Frequency of Follow-up: low     Monitor and Evaluation    Food and Nutrient Intake: energy intake, food and beverage intake  Food and Nutrient Adminstration: diet " order  Knowledge/Beliefs/Attitudes: food and nutrition knowledge/skill  Physical Activity and Function: nutrition-related ADLs and IADLs  Anthropometric Measurements: weight, weight change, body mass index  Biochemical Data, Medical Tests and Procedures: electrolyte and renal panel, gastrointestinal profile, glucose/endocrine profile, lipid profile  Nutrition-Focused Physical Findings: skin, overall appearance, extremities, muscles and bones     Nutrition Follow-Up    RD Follow-up?: Yes    I certify that I directed the dietetic intern in service delivery and guided them using my skilled judgment. As the cosigning dietitian, I have reviewed the dietetic interns documentation and am responsible for the treatment, assessment, and plan.

## 2025-02-10 NOTE — PLAN OF CARE
Recommendations    -- Continue Diabetic 2000 calorie/ low sodium diet as clinically indicated and tolerated  -- Encourage good intakes  -- Will order Arginaid and Glutasolve BID to promote wound healing  --Recommend MVI with minerals (zinc, vitamin A, Vitamin C) to promote healing  --Nursing, please document % po intake in flowsheets  --RD to monitor weight, po intake, skin, labs     Goals  --Maintain weight during admission  --Meet 75 -100 % po intake    Nutrition Goal Status: new  Communication of RD recs: POC

## 2025-02-10 NOTE — PLAN OF CARE
Problem: Occupational Therapy  Goal: Occupational Therapy Goal  Description: Goals to be met by: 3/10/25     Patient will increase functional independence with ADLs by performing:    UE Dressing with Moderate Assistance.  Grooming while standing at sink with Supervision.  Supine to sit with modified [place].  Toilet transfer to toilet with Supervision and RW prn.    Outcome: Progressing

## 2025-02-10 NOTE — PT/OT/SLP EVAL
Physical Therapy Evaluation    Patient Name:  Lisa Hendricks   MRN:  [id number]    Recommendations:     Discharge Recommendations: Low Intensity Therapy   Discharge Equipment Recommendations: none   Barriers to discharge: None    Highest Level of Mobility Safe with Nursing: Ambulation to/from bathroom with 1-person (A) & Rolling Walker ; Sitting up in chair    Assessment:   Co-evaluation performed due to multiple deficits anticipated requiring two skilled therapists to appropriately and safely assess patient's strength, endurance, functional mobility, and ADL performance while facilitating functional tasks in addition to accommodating for patient's activity tolerance and medical acuity.     Lisa Hendricks is a 65 y.o. female admitted with a medical diagnosis of Subarachnoid hemorrhage following injury, no loss of consciousness. Patient presented with increased motivation to participate in evaluation, with good response to completed activities and provided education. Overall, patient with good visualized functional mobility this date & endorsing that she is near her baseline. Patient able to articulate her deficits associated with chronic R-sided hemiparesis, as well as her compensatory strategies for safe mobility at home. Overall, patient requiring little-no assistance, other than toilet transfer to/from very low toilet. Gait assessment remained in room, in which patient with good obstacle negotiation & safety awareness. CGA provided for gait trial to/from bathroom with RW; with patient demonstrating good compensatory strategies for RLE clearance. Based upon information gained, PT recommends low intensity skilled physical therapy services post-acutely. Provided recommendation based upon needed intensity to not only directly address patient's previously listed functional impairments, discrepancy between functional baseline & current mobility status, and increased falls risk + positive recent history of fall, but to also  positively impact patient's quality of life & intervene on high risk for caregiver burnout. . Performance deficits impacting function include weakness, impaired endurance, decreased ROM, impaired coordination, impaired functional mobility, impaired balance, gait instability, decreased lower extremity function, decreased upper extremity function, impaired joint extensibility, impaired muscle length.    Rehab Prognosis: Good; patient would benefit from acute skilled PT services to address these deficits and reach maximum level of function.    Recent Surgery: * No surgery found *      Plan:     During this hospitalization, patient to be seen 3 x/week to address the identified rehab impairments via gait training, therapeutic activities, therapeutic exercises, neuromuscular re-education and progress toward the following goals:    Plan of Care Expires:  03/10/25    Subjective     Chief Complaint: None stated  Patient/Family Comments/goals: Patient eager to get OOB  Pain/Comfort:  Pain Rating 1: 0/10  Pain Rating Post-Intervention 1: 0/10    Patients cultural, spiritual, Lutheran conflicts given the current situation: no    Social History:  Residence: Patient lives with their son  in a single story house with  a TTE . Patient's bathroom has a T/S combo.  Equipment Owned: walker, rolling, wheelchair, bath bench, raised toilet, grab bar, lift chair. Patient has R AFO, but states her PCP told her to discontinue use secondary to pain  Prior level of function:  Prior to admission, patient was modified independent for ambulation, utilizing her RW. Patient was modified independent for all other functional mobility as well. Of note, patient with known R hemiparesis.  Assistance Upon Discharge:   Son  Caregiver from 9:00am-1:30pm 4days/week    Objective:     Communicated with RN prior to session.  Patient found HOB elevated with  (no active lines)  upon PT entry to room.    General Precautions: Standard, fall, diabetic,  "anti-coagulation medicine   Orthopedic Precautions:N/A   Braces: N/A   Body mass index is 36.4 kg/m².  Oxygen Device: Room Air  Vitals: /65 (BP Location: Left arm, Patient Position: Lying)   Pulse 61   Temp 97.9 °F (36.6 °C) (Oral)   Resp 16   Ht 5' 4" (1.626 m)   Wt 96.2 kg (212 lb 1.3 oz)   SpO2 95%   Breastfeeding No   BMI 36.40 kg/m²     Exams:  Cognition:   Alert and Cooperative   Patient is oriented to Person, Place, Time, Situation  Command following: Follows multistep verbal commands  Fluency: clear/fluent  Hearing: Intact  Vision:  Intact  Skin Integrity: Visible skin intact  Postural Assessment: rounded shoulders, forward head, increased kyphosis, and R hemiparesis  Physical Exam:    Left LE Right LE   Sensation intact to light touch intact to light touch   Coordination normal abnormal     LLE ROM: WFL  RLE ROM: WFL    LLE Strength (out of 5):   Hip Flexion:4+: Holds test position against MODERATE to STRONG pressure  Hip Abduction:4+: Holds test position against MODERATE to STRONG pressure  Knee Extension:4+: Holds test position against MODERATE to STRONG pressure  Ankle Dorsiflexion:4+: Holds test position against MODERATE to STRONG pressure    RLE Strength (out of 5):   Hip Flexion:4: Holds test positioning against MODERATE pressure  Hip Abduction:3+: Holds test position against SLIGHT pressure  Knee Extension:4: Holds test positioning against MODERATE pressure  Ankle Dorsiflexion:3-: Gradual Release from test position    Functional Mobility:    Bed Mobility:     EOB Scooting: Anterior: Contact Guard Assistance  Supine>Sit: Contact Guard Assistance with HOB Elevated  *Facilitation of trunk management    Transfers:     Sit<>Stand:   Contact Guard Assistance from Edge of Bed with Rolling Walker  Moderate Assistance from low Toilet with Rolling Walker  Minimal Assistance from Bedside Chair with No AD  Toilet Transfer: Minimal Assistance (x2) with Rolling Walker using step transfer technique. " Do to low toilet  Chair Transfer: Contact Guard Assistance with Rolling Walker using step transfer technique  *Facilitation of AD stabilization    Gait:  2x12ft, Contact Guard Assistance with Rolling Walker  Gait Assessment: decreased step length, decreased step height, decreased gaetano, decreased gait speed, antalgic & step-to gait pattern, decreased heel strike, decreased toe push off, overall R>L deficits  Total Distance: 24ft  *VC/TC for AD positioning to self    Balance:   Static Sitting: Supervision - Stand-By Assistance  Dynamic Sitting: Stand-By Assistance    Static Standing: Stand-By Assistance - Contact Guard Assistance  Dynamic Standing: Stand-By Assistance - Contact Guard Assistance    AM-PAC 6 CLICK MOBILITY  Total Score:17     Treatment & Education:  Patient Education Provided on:  The role of physical therapy and how the patient can benefit from skilled services  The negative effects of prolonged bed rest/sedentary behavior, along with the importance of OOB activity & patient participation with PT  Patient encouraged to sit [facility]  Patient encouraged to ambulate with RN  The importance of contacting RN, via call light, for mobility throughout the day  Pt white board updated with current therapists name and level of mobility assistance needed.     Patient Verbalized understanding of all topics touched on this date. All questions answered within the PT scope of practice    Patient left up in chair with all lines intact, call button in reach, chair alarm on, and RN notified.    GOALS:   Multidisciplinary Problems       Physical Therapy Goals          Problem: Physical Therapy    Goal Priority Disciplines Outcome Interventions   Physical Therapy Goal     PT, PT/OT Progressing    Description: Goals to be met by: 25     Patient will increase functional independence with mobility by performin. Supine to sit with Set-up [place]  2. Sit to stand transfer with Stand-by Assistance using LRAD as  appropriate  3. Bed to chair transfer with Stand-by Assistance using LRAD as appropriate  4. Gait  x 100 feet with Stand-by Assistance using LRAD as appropriate   5. Ascend/Descend 4 inch curb step with Stand-by Assistance using LRAD as appropriate  6. Stand for 10 minutes with Modified [place] using LRAD as appropriate  7. Lower extremity exercise program x20 reps per handout, with assistance as needed                         History:     Past Medical History:   Diagnosis Date    Acute cystitis without hematuria 10/29/2020    Asthma     Cirrhosis of liver without mention of alcohol     CVA (cerebral vascular accident) 2021    Diabetes mellitus, type 2     Gout, unspecified     Hyperlipidemia     Hypertension     Obesity, unspecified     Unspecified vitamin D deficiency     Uterine cancer        Past Surgical History:   Procedure Laterality Date     SECTION  , ,  1985     x3    CHOLECYSTECTOMY  2011    COLONOSCOPY N/A 2017    Procedure: COLONOSCOPY;  Surgeon: Luis Bogran-Reyes, MD;  Location: [facility];  Service: Endoscopy;  Laterality: N/A;    HYSTERECTOMY      for uterine cancer    OOPHORECTOMY         Time Tracking:     PT Received On: 02/10/25  PT Start Time: 1020     PT Stop Time: 1105  PT Total Time (min): 45 min     Billable Minutes: Evaluation 13 and Therapeutic Activity 32      02/10/2025

## 2025-02-10 NOTE — PLAN OF CARE
[facility] (Hospital)  Discharge Final Note    Primary Care Provider: Zainab Francis NP    Expected Discharge Date: 2/10/2025    Patient to be discharged home.  The patient has hh orders, however, the patient's Medicaid does not have in home therapy benefit.  CM requested team place OP orders.  PFC to provide wheelchair transportation home.    Future Appointments   Date Time Provider Department Center   2/12/2025 10:30 AM Kinga Macias PA-C [facility] ENDO [facility]   2/20/2025 10:15 AM Zainab Francis NP [facility] PRICAR [facility]   2/25/2025 12:15 PM [facility] OIC-CT2 500 LB LIMIT [facility] IC [facility]   2/25/2025  1:00 PM Ioana Foster PA-C [facility] NEUROS [facility]        Final Discharge Note (most recent)       Final Note - 02/10/25 1319          Final Note    Assessment Type Final Discharge Note     Anticipated Discharge Disposition Home or Self Care        Post-Acute Status    Post-Acute Authorization Other     Other Status Community Services   Will need OP PT/OT    Discharge Delays None known at this time                     Important Message from Medicare             Contact Info       Zainab Francis NP   Specialty: Family Medicine   Relationship: PCP - General    [address]  [facility] [address]  [facility] [address]   Phone: [phone]       Next Steps: Call

## 2025-02-10 NOTE — PLAN OF CARE
Problem: Physical Therapy  Goal: Physical Therapy Goal  Description: Goals to be met by: 25     Patient will increase functional independence with mobility by performin. Supine to sit with Set-up [place]  2. Sit to stand transfer with Stand-by Assistance using LRAD as appropriate  3. Bed to chair transfer with Stand-by Assistance using LRAD as appropriate  4. Gait  x 100 feet with Stand-by Assistance using LRAD as appropriate   5. Ascend/Descend 4 inch curb step with Stand-by Assistance using LRAD as appropriate  6. Stand for 10 minutes with Modified [place] using LRAD as appropriate  7. Lower extremity exercise program x20 reps per handout, with assistance as needed    Outcome: Progressing    Evaluation Complete. Goals Appropriate.

## 2025-02-10 NOTE — PLAN OF CARE
[facility] Neuro[facility] ([facility])      HOME HEALTH ORDERS  FACE TO FACE ENCOUNTER    Patient Name: Lisa Hendricks  YOB: 1960    PCP: Zainab Francis NP   PCP Address: [address] [place] [address] Dr. LAMB [address] [place] [address]  PCP Phone Number: [phone]  PCP Fax: [phone]    Encounter Date: 2/8/25    Admit to Home Health    Diagnoses:  Active Hospital Problems    Diagnosis  POA    *Subarachnoid hemorrhage following injury, no loss of consciousness [S06.6X0A]  Yes    History of CVA (cerebrovascular accident) [Z86.73]  Not Applicable    Hypertensive emergency [I16.1]  Yes     POA  Transferred on cardene        Subdural hematoma [S06.5XAA]  Yes    Right hemiparesis [G81.91]  Yes    Type 2 diabetes mellitus with microalbuminuria, with long-term current use of insulin [E11.29, R80.9, Z79.4]  Not Applicable    Essential hypertension [I10]  Yes    Mixed hyperlipidemia [E78.2]  Yes      Resolved Hospital Problems    Diagnosis Date Resolved POA    Subarachnoid bleed [I60.9] 02/08/2025 Unknown       Follow Up Appointments:  Future Appointments   Date Time Provider Department Center   2/12/2025 10:30 AM Kinga Macias PA-C [facility] ENDO [facility]    2/20/2025 10:15 AM Zainab Francis NP [facility] PRICAR [facility]   2/25/2025 12:15 PM [facility] OIC-CT2 500 LB LIMIT [facility] CTS IC Imaging Ctr   2/25/2025  1:00 PM Ioana Foster PA-C [facility] NEUROSC [place] [place]       Allergies:  Review of patient's allergies indicates:   Allergen Reactions    Levemir [insulin detemir] Other (See Comments)     Sob rash to arms and chest  abd cramping diarrhea    Lisinopril Swelling    Metformin      Other reaction(s): severe gi upset    Shellfish containing products      Other^asthma    Tramadol hcl      Other reaction(s): anaphylaxis    Losartan Other (See Comments)     Muscle cramp       Medications: Review discharge medications with patient and family and provide education.    Current Facility-Administered Medications   Medication Dose  Route Frequency Provider Last Rate Last Admin    acetaminophen tablet 650 mg  650 mg Oral Q4H PRN Caio Hackett MD   650 mg at 02/09/25 2041    albuterol inhaler 2 puff  2 puff Inhalation Q6H PRN Caio Hackett MD        amitriptyline tablet 25 mg  25 mg Oral Nightly PRN Caio Hackett MD        amLODIPine tablet 10 mg  10 mg Oral Daily Caio Hackett MD   10 mg at 02/10/25 0837    cetirizine tablet 10 mg  10 mg Oral Daily Caio Hackett MD   10 mg at 02/10/25 0837    dextrose 50% injection 12.5 g  12.5 g Intravenous PRN Caio Hackett MD        dextrose 50% injection 25 g  25 g Intravenous PRN Caio Hackett MD        enoxaparin injection 40 mg  40 mg Subcutaneous Q24H (prophylaxis, 1700) Caio Hackett MD        gabapentin capsule 300 mg  300 mg Oral QHS Caio Hackett MD   300 mg at 02/09/25 2041    glucagon (human recombinant) injection 1 mg  1 mg Intramuscular PRN Caio Hackett MD        glucose chewable tablet 16 g  16 g Oral PRN Caio Hackett MD        glucose chewable tablet 24 g  24 g Oral PRN Caio Hackett MD        insulin aspart U-100 pen 0-5 Units  0-5 Units Subcutaneous QID (AC + HS) PRN Caio Hackett MD   2 Units at 02/09/25 1230    insulin aspart U-100 pen 13 Units  13 Units Subcutaneous TIDWM Caio Hackett MD   13 Units at 02/10/25 1205    levETIRAcetam tablet 500 mg  500 mg Oral BID Caio Hackett MD   500 mg at 02/10/25 0837    losartan tablet 50 mg  50 mg Oral Daily Casimiro Bright MD   50 mg at 02/10/25 0837    metoprolol tartrate (LOPRESSOR) tablet 25 mg  25 mg Oral BID WM Caio Hackett MD   25 mg at 02/10/25 0837    mupirocin 2 % ointment   Nasal BID Morena Cooley NP   Given at 02/10/25 0835    naloxone 0.4 mg/mL injection 0.02 mg  0.02 mg Intravenous PRN Caio Hackett MD        polyethylene glycol packet 17 g  17 g Oral Daily Caio Hackett MD   17 g at 02/10/25 0836    sodium chloride 0.9% flush 10 mL  10 mL Intravenous Q12H PRN Caio Hackett MD             Medication List        START taking these medications      aspirin 81 MG EC tablet  Commonly known as: ECOTRIN  Take 1 tablet (81 mg total) by mouth once daily. HOLD until CT scan reviewed in 2 weeks  Start taking on: February 24, 2025     levETIRAcetam 500 MG Tab  Commonly known as: KEPPRA  Take 1 tablet (500 mg total) by mouth 2 (two) times daily. for 5 days            CONTINUE taking these medications      albuterol 90 mcg/actuation inhaler  Commonly known as: PROVENTIL/VENTOLIN HFA  Inhale 2 puffs into the lungs every 6 (six) hours as needed for Wheezing. Rescue     amitriptyline 25 MG tablet  Commonly known as: ELAVIL  Take 25 mg by mouth every evening.     amLODIPine 10 MG tablet  Commonly known as: NORVASC  TAKE 1 TABLET(10 MG) BY MOUTH EVERY DAY     blood sugar diagnostic Strp  1 strip by Misc.(Non-Drug; Combo Route) route 3 (three) times daily.     DEXCOM G7  Misc  Generic drug: blood-glucose meter,continuous  Use to check glucose with sensors     DEXCOM G7 SENSOR Meli  Generic drug: blood-glucose sensor  1 Device by Misc.(Non-Drug; Combo Route) route every 10 days.     EDARBI 80 mg Tab  Generic drug: azilsartan medoxomiL  Take 80 mg by mouth once daily.     ezetimibe 10 mg tablet  Commonly known as: ZETIA  TAKE 1 TABLET(10 MG) BY MOUTH EVERY DAY     gabapentin 300 MG capsule  Commonly known as: NEURONTIN  TAKE 1 CAPSULE(300 MG) BY MOUTH EVERY EVENING     incontinence pad, liner, disp Pads  2 each by Misc.(Non-Drug; Combo Route) route 2 (two) times a day.     insulin aspart U-100 100 unit/mL (3 mL) Inpn pen  Commonly known as: NovoLOG  Inject 30 Units into the skin 3 (three) times daily with meals.     JARDIANCE 25 mg tablet  Generic drug: empagliflozin  Take 1 tablet (25 mg total) by mouth once daily.     LANCETS MISC  1 Act by Misc.(Non-Drug; Combo Route) route 3 (three) times daily.     loratadine 10 mg tablet  Commonly known as: CLARITIN  Take 10 mg by mouth once daily.     metoprolol tartrate  "25 MG tablet  Commonly known as: LOPRESSOR  Take 1 tablet (25 mg total) by mouth 2 (two) times daily with meals.     pen needle, diabetic 32 gauge x 1/4" Ndle  1 Device by Misc.(Non-Drug; Combo Route) route 4 (four) times daily.     polyethylene glycol 17 gram Pwpk  Commonly known as: GLYCOLAX  Take 17 g by mouth once daily.     rosuvastatin 20 MG tablet  Commonly known as: CRESTOR  Take 20 mg by mouth once daily.                I have seen and examined this patient within the last 30 days. My clinical findings that support the need for the home health skilled services and home bound status are the following:no              Weakness/numbness causing balance and gait disturbance due to Stroke making it taxing to leave home.      Diet:   diabetic diet 2000 calorie     Labs:  N/a     Referrals/ Consults  Physical Therapy to evaluate and treat. Evaluate for home safety and equipment needs; Establish/upgrade home exercise program. Perform / instruct on therapeutic exercises, gait training, transfer training, and Range of Motion.  Occupational Therapy to evaluate and treat. Evaluate home environment for safety and equipment needs. Perform/Instruct on transfers, ADL training, ROM, and therapeutic exercises.  Aide to provide assistance with personal care, ADLs, and vital signs.     Activities:   activity as tolerated     Nursing:   Agency to admit patient within 24 hours of hospital discharge unless specified on physician order or at patient request     SN to complete comprehensive assessment including routine vital signs. Instruct on disease process and s/s of complications to report to MD. Review/verify medication list sent home with the patient at time of discharge  and instruct patient/caregiver as needed. Frequency may be adjusted depending on start of care date.      Skilled nurse to perform up to 3 visits PRN for symptoms related to diagnosis     Notify MD if SBP > 160 or < 90; DBP > 90 or < 50; HR > 120 or < 50; " Temp > 101; O2 < 88%;      Ok to schedule additional visits based on staff availability and patient request on consecutive days within the home health episode.     When multiple disciplines ordered:     Start of Care occurs on Sunday - Wednesday schedule remaining discipline evaluations as ordered on separate consecutive days following the start of care.     Thursday SOC -schedule subsequent evaluations Friday and Monday the following week.      Friday - Saturday SOC - schedule subsequent discipline evaluations on consecutive days starting Monday of the following week.       Miscellaneous   Diabetic Care:   SN to perform and educate Diabetic management with blood glucose monitoring:, Fingerstick blood sugar AC and HS, and Report CBG < 60 or > 350 to physician.     Please ensure patient does not take aspirin until she has received a CT scan and has discussed with her provider that results are safe to resume aspirin. *     Home Health Aide:  Physical Therapy Three times weekly, Occupational Therapy Three times weekly, and Home Health Aide Three times weekly and every 48 hours    Wound Care Orders  no     I certify that this patient is confined to her home and needs intermittent skilled nursing care, physical therapy, and occupational therapy.

## 2025-02-10 NOTE — PLAN OF CARE
Problem: Skin Injury Risk Increased  Goal: Skin Health and Integrity  Outcome: Progressing     Problem: Adult Inpatient Plan of Care  Goal: Plan of Care Review  Outcome: Progressing  Goal: Patient-Specific Goal (Individualized)  Outcome: Progressing  Goal: Absence of Hospital-Acquired Illness or Injury  Outcome: Progressing  Goal: Readiness for Transition of Care  Outcome: Progressing     Problem: Diabetes Comorbidity  Goal: Blood Glucose Level Within Targeted Range  Outcome: Progressing     Problem: Wound  Goal: Optimal Coping  Outcome: Progressing   Poc and meds reviewed with patient,all needs addressed

## 2025-02-10 NOTE — PT/OT/SLP EVAL
Occupational Therapy  Co Evaluation & Co-treatment    Name: Lisa Hendricks  MRN: [id number]  Admitting Diagnosis: Subarachnoid hemorrhage following injury, no loss of consciousness  Recent Surgery: * No surgery found *      Recommendations:     Discharge Recommendations: Low Intensity Therapy  Discharge Equipment Recommendations:  none  Barriers to discharge:  None    Assessment:     Lisa Hendricks is a 65 y.o. female with a medical diagnosis of Subarachnoid hemorrhage following injury, no loss of consciousness.  She presents with mildly decreased self-care and functional mobility at this time 2/2 AMS. Performance deficits affecting function: weakness, gait instability, decreased upper extremity function, impaired endurance, impaired balance, decreased lower extremity function, impaired fine motor, impaired self care skills, impaired functional mobility, decreased coordination, abnormal tone.  This date pt A&O x4, following all commands appropriately with pleasant affect. Prior to admission, pt reporting she utilized a RW for mobility and benefited from assistance for bathing, dressing, and meal preparation 2/2 PMH of CVA with residual RSW. Pt also reporting environmental modifications present in her home environment promote her mobility independence. At this time pt appears to be functioning close to her PLOF. Due to assistance needed, pt would benefit from skilled OT services in the acute setting to increase independence. Patient currently demonstrates a need for low intensity therapy on a scheduled basis secondary to a decline in functional status due to illness     Co-evaluation completed w/ PT due to patient medical instability and to ensure patient safety.     Rehab Prognosis: Good; patient would benefit from acute skilled OT services to address these deficits and reach maximum level of function.       Plan:     Patient to be seen 3 x/week to address the above listed problems via self-care/home management,  "therapeutic activities, therapeutic exercises, neuromuscular re-education  Plan of Care Expires: 03/10/25  Plan of Care Reviewed with: patient    Subjective     Chief Complaint: "I have grab bars and carpet that help me"  Patient/Family Comments/goals: return home    Occupational Profile:  Living Environment: Pt lives with son in [facility], threshold to enter, t/s with bath bench, grab bars in bathroom  Previous level of function: Assistance for bathing and dressing, modified independent for bed mobility and functional mobility with RW in home and w/c in community  Roles and Routines: Enjoys watching TV, spends most time up in chair   Equipment Used at Home: walker, rolling, wheelchair, bath bench, raised toilet, grab bar (lift chair)  Assistance upon Discharge: Pt reporting she has Aide for bathing and dressing assistance 4 days a week 9am-1:30 pm and son provides assistance for meals    Pain/Comfort:  Pain Rating 1: 0/10    Patients cultural, spiritual, Confucianist conflicts given the current situation: no    Objective:     Communicated with: nursing prior to session.  Patient found HOB elevated with PureWick upon OT entry to room.    General Precautions: Standard, fall, diabetic, anti-coagulation medicine  Orthopedic Precautions: N/A  Braces: N/A  Respiratory Status: Room air    Occupational Performance:    Bed Mobility:    Patient completed Rolling/Turning to Right with stand by assistance  Patient completed Scooting/Bridging with contact guard assistance  Patient completed Supine to Sit with contact guard assistance    Functional Mobility/Transfers:  Patient completed Sit <> Stand Transfer with contact guard assistance  with  rolling walker x3 trials, 1 trial completed with HHA and minimal assistance  Patient completed Bed <> Chair Transfer using Step Transfer technique with minimum assistance with rolling walker  Patient completed Toilet Transfer Step Transfer technique with moderate assistance with  rolling walker " 2/2 low toilet (pt with raised toilet at home)  Functional Mobility: Pt engaged in functional mobility to simulate household/community distances to and from bathroom with CGA and RW in order to maximize functional endurance and standing balance required for engagement in occupations of choice      Activities of Daily Living:  Upper Body Dressing: maximal assistance to ermelinda gown as robe  Lower Body Dressing: maximal assistance to ermelinda footwear  Toileting: maximal assistance for pericare and clothing management - pt reporting use of a bidet for pericare   Grooming: pt completing standing handwashing at sink with RW and SBA    Cognitive/Visual Perceptual:  Cognitive/Psychosocial Skills:     -       Oriented to: Person, Place, Time, and Situation   -       Follows Commands/attention:Follows multistep  commands  -       Communication: clear/fluent  -       Memory: No Deficits noted  -       Safety awareness/insight to disability: intact   -       Mood/Affect/Coping skills/emotional control: Pleasant  Visual/Perceptual:      -Intact      Physical Exam:  Balance:    -       intact sitting balance, fair static/dynamic standing balance  Sensation:    -       Intact  Upper Extremity Range of Motion:     -       Right Upper Extremity: WFL except shoulder flexion limited 2/2 PMH of CVA with RUE residual weakness  -       Left Upper Extremity: WNL  Upper Extremity Strength: -       Right Upper Extremity: Deficits: 2/5 shoulder, elbow flexion/extension 3+/5  -       Left Upper Extremity: WNL   Strength:    -       Right Upper Extremity: Deficits: 3/5  -       Left Upper Extremity: WNL  Fine Motor Coordination:    -       Impaired  Right hand thumb/finger opposition skills   and Right hand, manipulation of objects ; LUE intact  Gross motor coordination:   RUE impaired, LUE intact    AMPAC 6 Click ADL:  AMPAC Total Score: 15    Treatment & Education:  Session this date targeted initial OT evaluation, self-care, and therapeutic  activities to increase pt's independence  PT educated on OT roles, POC, call button for assistance, importance of OOB activities    Patient left up in chair with all lines intact, call button in reach, chair alarm on, and nursing notified    GOALS:   Multidisciplinary Problems       Occupational Therapy Goals          Problem: Occupational Therapy    Goal Priority Disciplines Outcome Interventions   Occupational Therapy Goal     OT, PT/OT Progressing    Description: Goals to be met by: 3/10/25     Patient will increase functional independence with ADLs by performing:    UE Dressing with Moderate Assistance.  Grooming while standing at sink with Supervision.  Supine to sit with modified Black.  Toilet transfer to toilet with Supervision and RW prn.                         DME Justifications:  No DME recommended requiring DME justifications    History:     Past Medical History:   Diagnosis Date    Acute cystitis without hematuria 10/29/2020    Asthma     Cirrhosis of liver without mention of alcohol     CVA (cerebral vascular accident) 2021    Diabetes mellitus, type 2     Gout, unspecified     Hyperlipidemia     Hypertension     Obesity, unspecified     Unspecified vitamin D deficiency     Uterine cancer          Past Surgical History:   Procedure Laterality Date     SECTION  , 1984,  1985     x3    CHOLECYSTECTOMY  2011    COLONOSCOPY N/A 2017    Procedure: COLONOSCOPY;  Surgeon: Luis Bogran-Reyes, MD;  Location: [facility];  Service: Endoscopy;  Laterality: N/A;    HYSTERECTOMY      for uterine cancer    OOPHORECTOMY         Time Tracking:     OT Date of Treatment: 02/10/25  OT Start Time: 1020  OT Stop Time: 1106  OT Total Time (min): 46 min    Billable Minutes:Evaluation 8  Self Care/Home Management 38    2/10/2025

## 2025-02-10 NOTE — PLAN OF CARE
Problem: Adult Inpatient Plan of Care  Goal: Optimal Comfort and Wellbeing  Outcome: Progressing     Problem: Adult Inpatient Plan of Care  Goal: Absence of Hospital-Acquired Illness or Injury  Outcome: Progressing   Plan of care discussed. Addressed questions and concerns. PT /OT pending for discharge. No acute distress.

## 2025-02-10 NOTE — TELEPHONE ENCOUNTER
Appts scheduled.   ----- Message from Jus Bar sent at 2/8/2025  9:47 AM CST -----  Hello    This patient needs follow up in 2 weeks with repeat CTH with PA in [facility]. Small traumatic SAH    Thank you  Jus   Letter by Johnson, Michael Duane, CNP at      Author: Johnson, Michael Duane, CNP Service: -- Author Type: --    Filed:  Encounter Date: 9/11/2020 Status: (Other)         Patient: Dony Huddleston   MR Number: [id number]   YOB: 1937   Date of Visit: 9/11/2020     [facility]    Facility:   [facility] [[id number]]   Code Status: DNR      CHIEF COMPLAINT/REASON FOR VISIT:  Chief Complaint   Patient presents with   ? Problem Visit     [facility] fall, rehab, hip, htn, bph,        HISTORY:      HPI: Dony is a 83 y.o. male who was hospitalized September 1, 2020 through September 8, 2020 secondary to a fall.  Right hip x-rays did show anatomic alignment of the right hip and no acute displaced fracture.  He does have mild bilateral hip osteoarthritis along with degenerative changes at the symphysis pubis and symmetric degenerative SI joints.  The CT of the pelvis did not show any evidence of avascular necrosis and also again confirms that there are degenerative changes in both hips.  He was diagnosed with right and left hip pain but negative for fractures and was given oxycodone and Tylenol.  They did feel it was a mechanical fall.  EKG was negative for any ischemic changes and the troponins x3 were negative.  He also has a history of hypertension and diabetes along with cognitive impairment and BPH.  Initially he did have a Auguste catheter.  He is now in the transitional care unit and due to cognition issues he has had occasional behavioral problems as well as forgetfulness but at times he has been redirectable.  In talking with the  they are looking at a potential for discharge the next couple days maybe even perhaps by the weekend.  He has been normotensive and afebrile and also on room air.  For pain he does take the oxycodone as needed and he does take about 2-3 doses per day and takes between 1 or 2 Tylenol as needed per day.  Blood sugars have been  checked due to history of diabetes but currently not being treated.  Sugars ranging 123-130.    Past Medical History:   Diagnosis Date   ? Dementia (H)    ? Diabetes mellitus, type II (H)    ? Hyperlipidemia    ? Hypertension    ? Insomnia    ? Osteoarthritis    ? Stroke (H)     lost taste in left side of mouth             Family History   Problem Relation Age of Onset   ? Ovarian cancer Mother    ? No Medical Problems Father    ? Stroke Sister      Social History     Socioeconomic History   ? Marital status:      Spouse name: Not on file   ? Number of children: Not on file   ? Years of education: Not on file   ? Highest education level: Not on file   Occupational History   ? Not on file   Social Needs   ? Financial resource strain: Not on file   ? Food insecurity     Worry: Not on file     Inability: Not on file   ? Transportation needs     Medical: Not on file     Non-medical: Not on file   Tobacco Use   ? Smoking status: Current Every Day Smoker     Packs/day: 0.25     Years: 70.00     Pack years: 17.50     Types: Cigarettes   ? Smokeless tobacco: Never Used   Substance and Sexual Activity   ? Alcohol use: Not Currently   ? Drug use: Yes     Types: Marijuana     Comment: 1 ounce/ 3 weeks   ? Sexual activity: Not Currently   Lifestyle   ? Physical activity     Days per week: Not on file     Minutes per session: Not on file   ? Stress: Not on file   Relationships   ? Social connections     Talks on phone: Not on file     Gets together: Not on file     Attends Denominational service: Not on file     Active member of club or organization: Not on file     Attends meetings of clubs or organizations: Not on file     Relationship status: Not on file   ? Intimate partner violence     Fear of current or ex partner: Not on file     Emotionally abused: Not on file     Physically abused: Not on file     Forced sexual activity: Not on file   Other Topics Concern   ? Not on file   Social History Narrative    Patient does not  use any assistive device for ambulation. Patient is not on any supplemental O2 at home. Patient's wife jared is HCP.          Review of Systems  There have been no reports of fever chills fatigue cough or cold or flulike symptoms nausea vomiting diarrhea dysuria headache stiff neck appetite changes or unusual myalgias.  There were no vitals filed for this visit.  Blood pressure 109/53, pulse 63, respirations 20, temperature 98.3, saturation room air 96%  Physical Exam  Head is normocephalic.  Neck is supple without adenopathy.  Lung sounds are clear throughout.  Cardiovascular S1-S2 regular rhythm.  No lower extremity edema.  Gastrointestinal nontender and nondistended.  Musculoskeletal denies any hip.  Psychiatric: Flat affect.  LABS:   Lab Results   Component Value Date    HGBA1C 6.4 (H) 09/01/2020     Lab Results   Component Value Date    WBC 7.8 09/04/2020    HGB 11.9 (L) 09/04/2020    HCT 35.1 (L) 09/04/2020    MCV 97 09/04/2020     09/04/2020     Results for orders placed or performed during the hospital encounter of 09/01/20   Basic Metabolic Panel   Result Value Ref Range    Sodium 139 136 - 145 mmol/L    Potassium 4.2 3.5 - 5.0 mmol/L    Chloride 107 98 - 107 mmol/L    CO2 25 22 - 31 mmol/L    Anion Gap, Calculation 7 5 - 18 mmol/L    Glucose 112 70 - 125 mg/dL    Calcium 9.4 8.5 - 10.5 mg/dL    BUN 26 8 - 28 mg/dL    Creatinine 0.98 0.70 - 1.30 mg/dL    GFR MDRD Af Amer >60 >60 mL/min/1.73m2    GFR MDRD Non Af Amer >60 >60 mL/min/1.73m2       Lab Results   Component Value Date    ALT 17 09/01/2020    AST 15 09/01/2020    ALKPHOS 81 09/01/2020    BILITOT 0.5 09/01/2020     .  Lab Results   Component Value Date    ALBUMIN 2.8 (L) 09/01/2020         ASSESSMENT:      ICD-10-CM    1. Accident due to mechanical fall without injury, subsequent encounter  W19.XXXD    2. Hip pain  M25.559    3. Hypertension, unspecified type  I10    4. Cognitive and behavioral changes  R41.89     R46.89        PLAN:     Continue to manage his chronic medical conditions.  It appears that he will potentially have a discharge in the next day or 2 but that has not been formally determined.  If he does get discharged we will send him home with current medications.  Otherwise continue to work with him in therapy and continue to manage and follow.    For documentation purposes total visit 35 minutes to which over 50% was spent with the patient to establish care along with his medications and his hospitalization and his concerns as well as coordination of care with other staff members and .      Electronically signed by: Michael Duane Johnson, CNP

## 2025-02-11 NOTE — PROGRESS NOTES
Subjective:      Patient ID: Lisa Hendricks is a 65 y.o. female.    Chief Complaint:  Diabetes    History of Present Illness  This is a 65 y.o. female. with a past medical history of HTN, DM2 on insulin, CVA with R sided weakness here for DM2.    Interval History: patient has not been seen by endocrinology in over a year. She recently was discharged from the hospital after a fall which resulted in Subdural hematoma and subarachnoid hemorrhage.     Type 2 diabetes mellitus    Current diabetes medications:  - Jardiance 25 mg daily  - Novolog 20 U ACTID- typically given right after meals     Past diabetes medications:  - Trulicity - GI intolerance  - Toujeo  - Janumet XR 50/1,000 - GI upset  - Metformin - GI upset  - Lantus  - Mounjaro - Gi intolerance     Lab Results   Component Value Date    CREATININE 0.8 02/10/2025    EGFRNORACEVR >60.0 02/10/2025     Known diabetic complications: cerebrovascular disease    Weight trend:  Wt Readings from Last 8 Encounters:   02/10/25 96.2 kg (212 lb 1.3 oz)   02/08/25 104.3 kg (230 lb)   06/13/24 104.3 kg (230 lb)   03/13/24 104.3 kg (230 lb)   11/09/23 104.3 kg (230 lb)   10/24/23 104.3 kg (230 lb)   04/12/23 103 kg (227 lb)   10/04/22 90.7 kg (200 lb)     Family history of diabetes:  Yes    Prior visit with diabetes education: Yes    Current diet: 3 meals per day  Current exercise: Limited    Blood glucose monitoring at home: 200s    Diabetes Management Status  Statin: Taking  ACE/ARB: Not taking    Screening or Prevention Patient's value   HgA1C Testing and Control   Lab Results   Component Value Date    HGBA1C 9.2 (H) 02/08/2025        LDL control Lab Results   Component Value Date    LDLCALC 83.6 02/09/2025      Nephropathy screening Lab Results   Component Value Date    MICALBCREAT 64.4 (H) 10/26/2016        Lab Results   Component Value Date    TSH 4.49 11/06/2020     Last eye exam: : 05/26/2023    Review of Systems  As above    Social and family history reviewed  Current  medications and allergies reviewed    Objective:   BP (!) 137/59   Pulse 67   Resp 18   SpO2 97%   Physical Exam   Sitting on wheelchair  Alert, oriented    BP Readings from Last 1 Encounters:   02/12/25 (!) 137/59      Wt Readings from Last 1 Encounters:   02/10/25 1323 96.2 kg (212 lb 1.3 oz)   02/09/25 0152 96.2 kg (212 lb 1.3 oz)   02/09/25 0028 96.2 kg (212 lb 1.3 oz)   02/08/25 0555 101.6 kg (224 lb)     There is no height or weight on file to calculate BMI.    Lab Review:   Lab Results   Component Value Date    HGBA1C 9.2 (H) 02/08/2025     Lab Results   Component Value Date    CHOL 166 02/09/2025    HDL 35 (L) 02/09/2025    LDLCALC 83.6 02/09/2025    TRIG 237 (H) 02/09/2025    CHOLHDL 21.1 02/09/2025     Lab Results   Component Value Date     02/10/2025    K 3.8 02/10/2025     (H) 02/10/2025    CO2 19 (L) 02/10/2025     (H) 02/10/2025    BUN 19 02/10/2025    CREATININE 0.8 02/10/2025    CALCIUM 8.5 (L) 02/10/2025    PROT 6.4 02/10/2025    ALBUMIN 3.2 (L) 02/10/2025    BILITOT 0.4 02/10/2025    ALKPHOS 85 02/10/2025    AST 21 02/10/2025    ALT 19 02/10/2025    ANIONGAP 9 02/10/2025    ESTGFRAFRICA >60.0 02/02/2022    EGFRNONAA >60.0 02/02/2022    TSH 4.49 11/06/2020     All pertinent labs reviewed    Assessment and Plan     Type 2 diabetes mellitus with microalbuminuria, with long-term current use of insulin  Uncontrolled based on A1c 9.2% and reported POCT glucose with significant glycemic variability. Average around 200. She is on short acting insulin and SGLT-2 inhibitor. She has had side effects with GLP-1 RA and GLP-1/GIP RA. She was recently discharged from the hospital due to a subdural hematoma and subarachnoid hemorrhage. Discussed with patient restarting CGM for better glucose monitor. Will likely need to restart basal insulin, but will adjust after CGM data review.     Plan   - Jardiance 25 mg daily  - Novolog 20 U ACTID    Follow up in 3 weeks    Class 2 severe obesity with  serious comorbidity and body mass index (BMI) of 39.0 to 39.9 in adult  Side effects to GLP-1 RA and GLP-1/GIP RA    Essential hypertension  Continue ACEi/ARB for antihypertensive treatment     Kinga Macias PA-C  Endocrinology

## 2025-02-11 NOTE — NURSING
Patient is discharged to home.Discharge instructions given at bedside, patient verbalized understanding.

## 2025-02-11 NOTE — ASSESSMENT & PLAN NOTE
Uncontrolled based on A1c 9.2% and reported POCT glucose with significant glycemic variability. Average around 200. She is on short acting insulin and SGLT-2 inhibitor. She has had side effects with GLP-1 RA and GLP-1/GIP RA. She was recently discharged from the hospital due to a subdural hematoma and subarachnoid hemorrhage. Discussed with patient restarting CGM for better glucose monitor. Will likely need to restart basal insulin, but will adjust after CGM data review.     Plan   - Jardiance 25 mg daily  - Novolog 20 U ACTID    Follow up in 3 weeks

## 2025-02-12 ENCOUNTER — OFFICE VISIT (OUTPATIENT)
Dept: ENDOCRINOLOGY | Facility: CLINIC | Age: 65
End: 2025-02-12
Payer: MEDICAID

## 2025-02-12 VITALS
HEART RATE: 67 BPM | SYSTOLIC BLOOD PRESSURE: 137 MMHG | DIASTOLIC BLOOD PRESSURE: 59 MMHG | RESPIRATION RATE: 18 BRPM | OXYGEN SATURATION: 97 %

## 2025-02-12 DIAGNOSIS — Z79.4 TYPE 2 DIABETES MELLITUS WITH MICROALBUMINURIA, WITH LONG-TERM CURRENT USE OF INSULIN: Primary | ICD-10-CM

## 2025-02-12 DIAGNOSIS — E66.812 CLASS 2 SEVERE OBESITY DUE TO EXCESS CALORIES WITH SERIOUS COMORBIDITY AND BODY MASS INDEX (BMI) OF 39.0 TO 39.9 IN ADULT: ICD-10-CM

## 2025-02-12 DIAGNOSIS — R80.9 TYPE 2 DIABETES MELLITUS WITH MICROALBUMINURIA, WITH LONG-TERM CURRENT USE OF INSULIN: Primary | ICD-10-CM

## 2025-02-12 DIAGNOSIS — E11.29 TYPE 2 DIABETES MELLITUS WITH MICROALBUMINURIA, WITH LONG-TERM CURRENT USE OF INSULIN: Primary | ICD-10-CM

## 2025-02-12 DIAGNOSIS — I10 ESSENTIAL HYPERTENSION: ICD-10-CM

## 2025-02-12 DIAGNOSIS — E66.01 CLASS 2 SEVERE OBESITY DUE TO EXCESS CALORIES WITH SERIOUS COMORBIDITY AND BODY MASS INDEX (BMI) OF 39.0 TO 39.9 IN ADULT: ICD-10-CM

## 2025-02-12 PROCEDURE — 1159F MED LIST DOCD IN RCRD: CPT | Mod: CPTII,,, | Performed by: PHYSICIAN ASSISTANT

## 2025-02-12 PROCEDURE — 99214 OFFICE O/P EST MOD 30 MIN: CPT | Mod: S$PBB,,, | Performed by: PHYSICIAN ASSISTANT

## 2025-02-12 PROCEDURE — 99214 OFFICE O/P EST MOD 30 MIN: CPT | Mod: PBBFAC | Performed by: PHYSICIAN ASSISTANT

## 2025-02-12 PROCEDURE — 3075F SYST BP GE 130 - 139MM HG: CPT | Mod: CPTII,,, | Performed by: PHYSICIAN ASSISTANT

## 2025-02-12 PROCEDURE — G2211 COMPLEX E/M VISIT ADD ON: HCPCS | Mod: S$PBB,,, | Performed by: PHYSICIAN ASSISTANT

## 2025-02-12 PROCEDURE — 3046F HEMOGLOBIN A1C LEVEL >9.0%: CPT | Mod: CPTII,,, | Performed by: PHYSICIAN ASSISTANT

## 2025-02-12 PROCEDURE — 99999 PR PBB SHADOW E&M-EST. PATIENT-LVL IV: CPT | Mod: PBBFAC,,, | Performed by: PHYSICIAN ASSISTANT

## 2025-02-12 PROCEDURE — 3078F DIAST BP <80 MM HG: CPT | Mod: CPTII,,, | Performed by: PHYSICIAN ASSISTANT

## 2025-02-12 PROCEDURE — 1111F DSCHRG MED/CURRENT MED MERGE: CPT | Mod: CPTII,,, | Performed by: PHYSICIAN ASSISTANT

## 2025-02-12 RX ORDER — BLOOD-GLUCOSE SENSOR
1 EACH MISCELLANEOUS
Qty: 9 EACH | Refills: 3 | Status: SHIPPED | OUTPATIENT
Start: 2025-02-12 | End: 2026-02-12

## 2025-02-14 ENCOUNTER — OFFICE VISIT (OUTPATIENT)
Dept: PRIMARY CARE CLINIC | Facility: CLINIC | Age: 65
End: 2025-02-14
Payer: MEDICAID

## 2025-02-14 VITALS
DIASTOLIC BLOOD PRESSURE: 63 MMHG | OXYGEN SATURATION: 98 % | HEIGHT: 64 IN | HEART RATE: 66 BPM | SYSTOLIC BLOOD PRESSURE: 137 MMHG | BODY MASS INDEX: 36.4 KG/M2

## 2025-02-14 DIAGNOSIS — E11.69 DIABETES MELLITUS TYPE 2 IN OBESE: ICD-10-CM

## 2025-02-14 DIAGNOSIS — E55.9 VITAMIN D DEFICIENCY: ICD-10-CM

## 2025-02-14 DIAGNOSIS — E66.9 DIABETES MELLITUS TYPE 2 IN OBESE: ICD-10-CM

## 2025-02-14 DIAGNOSIS — E11.29 TYPE 2 DIABETES MELLITUS WITH MICROALBUMINURIA, WITH LONG-TERM CURRENT USE OF INSULIN: Primary | ICD-10-CM

## 2025-02-14 DIAGNOSIS — Z13.0 SCREENING FOR IRON DEFICIENCY ANEMIA: ICD-10-CM

## 2025-02-14 DIAGNOSIS — E78.2 MIXED HYPERLIPIDEMIA: ICD-10-CM

## 2025-02-14 DIAGNOSIS — K76.0 HEPATIC STEATOSIS: ICD-10-CM

## 2025-02-14 DIAGNOSIS — Z85.42 HISTORY OF UTERINE CANCER: ICD-10-CM

## 2025-02-14 DIAGNOSIS — M10.9 GOUT, UNSPECIFIED CAUSE, UNSPECIFIED CHRONICITY, UNSPECIFIED SITE: ICD-10-CM

## 2025-02-14 DIAGNOSIS — I10 ESSENTIAL HYPERTENSION: ICD-10-CM

## 2025-02-14 DIAGNOSIS — J45.20 MILD INTERMITTENT ASTHMA, UNSPECIFIED WHETHER COMPLICATED: ICD-10-CM

## 2025-02-14 DIAGNOSIS — Z13.29 THYROID DISORDER SCREENING: ICD-10-CM

## 2025-02-14 DIAGNOSIS — Z79.4 TYPE 2 DIABETES MELLITUS WITH MICROALBUMINURIA, WITH LONG-TERM CURRENT USE OF INSULIN: Primary | ICD-10-CM

## 2025-02-14 DIAGNOSIS — Z11.4 ENCOUNTER FOR SCREENING FOR HIV: ICD-10-CM

## 2025-02-14 DIAGNOSIS — F51.01 PRIMARY INSOMNIA: ICD-10-CM

## 2025-02-14 DIAGNOSIS — R80.9 TYPE 2 DIABETES MELLITUS WITH MICROALBUMINURIA, WITH LONG-TERM CURRENT USE OF INSULIN: Primary | ICD-10-CM

## 2025-02-14 PROCEDURE — 99999 PR PBB SHADOW E&M-EST. PATIENT-LVL III: CPT | Mod: PBBFAC,,, | Performed by: NURSE PRACTITIONER

## 2025-02-14 PROCEDURE — 99213 OFFICE O/P EST LOW 20 MIN: CPT | Mod: PBBFAC | Performed by: NURSE PRACTITIONER

## 2025-02-14 RX ORDER — INSULIN ASPART 100 [IU]/ML
30 INJECTION, SOLUTION INTRAVENOUS; SUBCUTANEOUS
Qty: 9 ML | Refills: 2 | Status: SHIPPED | OUTPATIENT
Start: 2025-02-14 | End: 2025-05-15

## 2025-02-14 RX ORDER — AMITRIPTYLINE HYDROCHLORIDE 25 MG/1
25 TABLET, FILM COATED ORAL NIGHTLY
Qty: 90 TABLET | Refills: 3 | Status: SHIPPED | OUTPATIENT
Start: 2025-02-14

## 2025-02-14 RX ORDER — METOPROLOL TARTRATE 25 MG/1
25 TABLET, FILM COATED ORAL 2 TIMES DAILY WITH MEALS
Qty: 180 TABLET | Refills: 3 | Status: SHIPPED | OUTPATIENT
Start: 2025-02-14

## 2025-02-14 NOTE — PROGRESS NOTES
"[facility] Note    HPI:  Lisa Hendricks is a 65 y.o. female who presents today for Hospital Follow Up ([facility])     Recently hospitalized with subarachnoid hemorrhage after falling and hitting head on ceramic tile, CVA 11/2020    ROS   A review of systems was performed and was negative except as noted above.    I personally reviewed allergies, past medical, surgical, social and family history and updated as appropriate.    Medications:    Current Outpatient Medications:     albuterol (PROVENTIL/VENTOLIN HFA) 90 mcg/actuation inhaler, Inhale 2 puffs into the lungs every 6 (six) hours as needed for Wheezing. Rescue, Disp: , Rfl:     amitriptyline (ELAVIL) 25 MG tablet, Take 1 tablet (25 mg total) by mouth every evening., Disp: 90 tablet, Rfl: 3    amLODIPine (NORVASC) 10 MG tablet, TAKE 1 TABLET(10 MG) BY MOUTH EVERY DAY, Disp: 90 tablet, Rfl: 3    [START ON 2/24/2025] aspirin (ECOTRIN) 81 MG EC tablet, Take 1 tablet (81 mg total) by mouth once daily. HOLD until CT scan reviewed in 2 weeks, Disp: 360 tablet, Rfl: 0    azilsartan medoxomiL (EDARBI) 80 mg Tab, Take 80 mg by mouth once daily., Disp: 90 tablet, Rfl: 3    blood sugar diagnostic Strp, 1 strip by Misc.(Non-Drug; Combo Route) route 3 (three) times daily., Disp: , Rfl:     blood-glucose meter,continuous (DEXCOM G7 ) Misc, Use to check glucose with sensors, Disp: 1 each, Rfl: 0    DEXCOM G7 SENSOR Meli, 1 Device by Misc.(Non-Drug; Combo Route) route every 10 days., Disp: 9 each, Rfl: 3    gabapentin (NEURONTIN) 300 MG capsule, TAKE 1 CAPSULE(300 MG) BY MOUTH EVERY EVENING, Disp: 30 capsule, Rfl: 11    insulin aspart U-100 (NOVOLOG) 100 unit/mL (3 mL) InPn pen, Inject 30 Units into the skin 3 (three) times daily with meals., Disp: 9 mL, Rfl: 2    insulin needles, disposable, 32 x 1/4 " Ndle, 1 Device by Misc.(Non-Drug; Combo Route) route 4 (four) times daily., Disp: , Rfl:     JARDIANCE 25 mg tablet, Take 1 tablet (25 mg total) by mouth " once daily., Disp: 30 tablet, Rfl: 11    LANCETS MISC, 1 Act by Misc.(Non-Drug; Combo Route) route 3 (three) times daily., Disp: , Rfl:     levETIRAcetam (KEPPRA) 500 MG Tab, Take 1 tablet (500 mg total) by mouth 2 (two) times daily. for 5 days, Disp: 10 tablet, Rfl: 0    loratadine (CLARITIN) 10 mg tablet, Take 10 mg by mouth once daily., Disp: , Rfl:     metoprolol tartrate (LOPRESSOR) 25 MG tablet, Take 1 tablet (25 mg total) by mouth 2 (two) times daily with meals., Disp: 180 tablet, Rfl: 3     Health Maintenance:  Immunization History   Administered Date(s) Administered    DTP 1960, 1960, 1960, 07/10/1968    OPV 1960, 1960, 02/07/1963, 08/08/1968    PPD Test 11/07/2020    Td (ADULT) 02/28/1979, 08/28/2007      Health Maintenance   Topic Date Due    HIV Screening  Never done    Diabetes Urine Screening  10/26/2017    Foot Exam  11/22/2022    Diabetic Eye Exam  05/26/2024    RSV Vaccine (Age 60+ and Pregnant patients) (1 - Risk 60-74 years 1-dose series) 02/14/2025 (Originally 2/6/2022)    TETANUS VACCINE  02/14/2026 (Originally 8/28/2017)    Shingles Vaccine (1 of 2) 02/14/2026 (Originally 2/6/2012)    COVID-19 Vaccine (1 - 2024-25 season) 02/14/2026 (Originally 9/1/2024)    Pneumococcal Vaccines (Age 50+) (1 of 2 - PCV) 02/14/2026 (Originally 2/6/1981)    Hemoglobin A1c  05/08/2025    Mammogram  06/13/2025    Lipid Panel  02/09/2026    Colorectal Cancer Screening  07/13/2027    Hepatitis C Screening  Completed    Influenza Vaccine  Discontinued     Health Maintenance Topics with due status: Not Due       Topic Last Completion Date    Colorectal Cancer Screening 07/13/2017    Mammogram 06/13/2024    Hemoglobin A1c 02/08/2025    Lipid Panel 02/09/2025     Health Maintenance Due   Topic Date Due    HIV Screening  Never done    Diabetes Urine Screening  10/26/2017    Foot Exam  11/22/2022    Diabetic Eye Exam  05/26/2024       PHYSICAL EXAM:  Vitals:    02/14/25 1019   BP: 137/63  "  Patient Position: Sitting   Pulse: 66   SpO2: 98%   Height: 5' 4" (1.626 m)     Body mass index is 36.4 kg/m².  Physical Exam     ASSESSMENT/PLAN:  1. Type 2 diabetes mellitus with microalbuminuria, with long-term current use of insulin  -     Comprehensive Metabolic Panel; Future; Expected date: 02/14/2025  -     Hemoglobin A1C; Future; Expected date: 02/14/2025  -     Microalbumin/Creatinine Ratio, Urine; Future; Expected date: 02/14/2025    2. Essential hypertension  -     metoprolol tartrate (LOPRESSOR) 25 MG tablet; Take 1 tablet (25 mg total) by mouth 2 (two) times daily with meals.  Dispense: 180 tablet; Refill: 3    3. Vitamin D deficiency  -     Misc Sendout Test, Blood Vitamin D; Future; Expected date: 02/14/2025    4. Encounter for screening for HIV  -     HIV 1/2 Ag/Ab (4th Gen); Future; Expected date: 02/14/2025    5. Mixed hyperlipidemia  -     Lipid Panel; Future; Expected date: 02/14/2025    6. Thyroid disorder screening  -     TSH; Future; Expected date: 02/14/2025    7. Screening for iron deficiency anemia  -     CBC Auto Differential; Future; Expected date: 02/14/2025    8. Primary insomnia  -     amitriptyline (ELAVIL) 25 MG tablet; Take 1 tablet (25 mg total) by mouth every evening.  Dispense: 90 tablet; Refill: 3    9. Diabetes mellitus type 2 in obese  -     insulin aspart U-100 (NOVOLOG) 100 unit/mL (3 mL) InPn pen; Inject 30 Units into the skin 3 (three) times daily with meals.  Dispense: 9 mL; Refill: 2    10. Mild intermittent asthma, unspecified whether complicated    11. Gout, unspecified cause, unspecified chronicity, unspecified site    12. History of uterine cancer    13. Hepatic steatosis        Other than changes above, continue current medications and maintain follow up with specialists.      Follow up if symptoms worsen or fail to improve.   Recent Results (from the past 12 weeks)   CBC auto differential    Collection Time: 02/08/25  1:10 AM   Result Value Ref Range    WBC 17.36 " (H) 3.90 - 12.70 K/uL    RBC 4.71 4.00 - 5.40 M/uL    Hemoglobin 14.2 12.0 - 16.0 g/dL    Hematocrit 43.8 37.0 - 48.5 %    MCV 93 82 - 98 fL    MCH 30.1 27.0 - 31.0 pg    MCHC 32.4 32.0 - 36.0 g/dL    RDW 12.4 11.5 - 14.5 %    Platelets 272 150 - 450 K/uL    MPV 11.4 9.2 - 12.9 fL    Immature Granulocytes 0.9 (H) 0.0 - 0.5 %    Gran # (ANC) 13.7 (H) 1.8 - 7.7 K/uL    Immature Grans (Abs) 0.15 (H) 0.00 - 0.04 K/uL    Lymph # 2.1 1.0 - 4.8 K/uL    Mono # 1.1 (H) 0.3 - 1.0 K/uL    Eos # 0.2 0.0 - 0.5 K/uL    Baso # 0.08 0.00 - 0.20 K/uL    nRBC 0 0 /100 WBC    Gran % 78.8 (H) 38.0 - 73.0 %    Lymph % 12.2 (L) 18.0 - 48.0 %    Mono % 6.3 4.0 - 15.0 %    Eosinophil % 1.3 0.0 - 8.0 %    Basophil % 0.5 0.0 - 1.9 %    Differential Method Automated    Comprehensive metabolic panel    Collection Time: 02/08/25  1:10 AM   Result Value Ref Range    Sodium 139 136 - 145 mmol/L    Potassium 4.1 3.5 - 5.1 mmol/L    Chloride 108 95 - 110 mmol/L    CO2 19 (L) 23 - 29 mmol/L    Glucose 353 (H) 70 - 110 mg/dL    BUN 15 8 - 23 mg/dL    Creatinine 0.9 0.5 - 1.4 mg/dL    Calcium 9.0 8.7 - 10.5 mg/dL    Total Protein 7.6 6.0 - 8.4 g/dL    Albumin 3.9 3.5 - 5.2 g/dL    Total Bilirubin 0.2 0.1 - 1.0 mg/dL    Alkaline Phosphatase 129 55 - 135 U/L    AST 23 10 - 40 U/L    ALT 20 10 - 44 U/L    eGFR >60.0 >60 mL/min/1.73 m^2    Anion Gap 12 8 - 16 mmol/L   APTT    Collection Time: 02/08/25  1:10 AM   Result Value Ref Range    aPTT 24.4 21.0 - 32.0 sec   Protime-INR    Collection Time: 02/08/25  1:10 AM   Result Value Ref Range    Prothrombin Time 10.7 9.0 - 12.5 sec    INR 1.0 0.8 - 1.2   EKG 12-lead    Collection Time: 02/08/25  1:12 AM   Result Value Ref Range    QRS Duration 66 ms    [facility] QTC Calculation 448 ms   Urinalysis, Reflex to Urine Culture Urine, Clean Catch    Collection Time: 02/08/25  1:45 AM    Specimen: Urine   Result Value Ref Range    Specimen UA Urine, Unspecified     Color, UA Yellow Yellow, Straw, Johana    Appearance, UA  Clear Clear    pH, UA 5.0 5.0 - 8.0    Specific Gravity, UA 1.005 1.005 - 1.030    Protein, UA Negative Negative    Glucose, UA 4+ (A) Negative    Ketones, UA 1+ (A) Negative    Bilirubin (UA) Negative Negative    Occult Blood UA Negative Negative    Nitrite, UA Negative Negative    Urobilinogen, UA Negative <2.0 EU/dL    Leukocytes, UA Negative Negative   Urinalysis Microscopic    Collection Time: 02/08/25  1:45 AM   Result Value Ref Range    RBC, UA 0 0 - 4 /hpf    WBC, UA 15 (H) 0 - 5 /hpf    Bacteria Rare None-Occ /hpf    Yeast, UA Rare (A) None    Squam Epithel, UA 3 /hpf    Hyaline Casts, UA 0 0-1/lpf /lpf    Microscopic Comment SEE COMMENT    Urine culture    Collection Time: 02/08/25  1:45 AM    Specimen: Urine   Result Value Ref Range    Urine Culture, Routine No significant growth    POCT glucose    Collection Time: 02/08/25  2:04 AM   Result Value Ref Range    POCT Glucose 304 (H) 70 - 110 mg/dL   POCT glucose    Collection Time: 02/08/25  3:01 AM   Result Value Ref Range    POCT Glucose 243 (H) 70 - 110 mg/dL   Hemoglobin A1c    Collection Time: 02/08/25  3:26 PM   Result Value Ref Range    Hemoglobin A1C 9.2 (H) 4.0 - 5.6 %    Estimated Avg Glucose 217 (H) 68 - 131 mg/dL   POCT glucose    Collection Time: 02/08/25  6:21 PM   Result Value Ref Range    POCT Glucose 185 (H) 70 - 110 mg/dL   Comprehensive Metabolic Panel (CMP)    Collection Time: 02/09/25  5:24 AM   Result Value Ref Range    Sodium 139 136 - 145 mmol/L    Potassium 4.0 3.5 - 5.1 mmol/L    Chloride 111 (H) 95 - 110 mmol/L    CO2 19 (L) 23 - 29 mmol/L    Glucose 176 (H) 70 - 110 mg/dL    BUN 13 8 - 23 mg/dL    Creatinine 0.8 0.5 - 1.4 mg/dL    Calcium 8.5 (L) 8.7 - 10.5 mg/dL    Total Protein 6.5 6.0 - 8.4 g/dL    Albumin 3.3 (L) 3.5 - 5.2 g/dL    Total Bilirubin 0.7 0.1 - 1.0 mg/dL    Alkaline Phosphatase 90 40 - 150 U/L    AST 18 10 - 40 U/L    ALT 19 10 - 44 U/L    eGFR >60.0 >60 mL/min/1.73 m^2    Anion Gap 9 8 - 16 mmol/L   Lipid panel     Collection Time: 02/09/25  5:24 AM   Result Value Ref Range    Cholesterol 166 120 - 199 mg/dL    Triglycerides 237 (H) 30 - 150 mg/dL    HDL 35 (L) 40 - 75 mg/dL    LDL Cholesterol 83.6 63.0 - 159.0 mg/dL    HDL/Cholesterol Ratio 21.1 20.0 - 50.0 %    Total Cholesterol/HDL Ratio 4.7 2.0 - 5.0    Non-HDL Cholesterol 131 mg/dL   CBC with Automated Differential    Collection Time: 02/09/25  5:24 AM   Result Value Ref Range    WBC 8.37 3.90 - 12.70 K/uL    RBC 4.12 4.00 - 5.40 M/uL    Hemoglobin 12.5 12.0 - 16.0 g/dL    Hematocrit 40.2 37.0 - 48.5 %    MCV 98 82 - 98 fL    MCH 30.3 27.0 - 31.0 pg    MCHC 31.1 (L) 32.0 - 36.0 g/dL    RDW 12.8 11.5 - 14.5 %    Platelets 263 150 - 450 K/uL    MPV 11.5 9.2 - 12.9 fL    Immature Granulocytes 0.6 (H) 0.0 - 0.5 %    Gran # (ANC) 5.0 1.8 - 7.7 K/uL    Immature Grans (Abs) 0.05 (H) 0.00 - 0.04 K/uL    Lymph # 2.2 1.0 - 4.8 K/uL    Mono # 0.7 0.3 - 1.0 K/uL    Eos # 0.3 0.0 - 0.5 K/uL    Baso # 0.05 0.00 - 0.20 K/uL    nRBC 0 0 /100 WBC    Gran % 60.3 38.0 - 73.0 %    Lymph % 25.9 18.0 - 48.0 %    Mono % 8.7 4.0 - 15.0 %    Eosinophil % 3.9 0.0 - 8.0 %    Basophil % 0.6 0.0 - 1.9 %    Differential Method Automated    POCT glucose    Collection Time: 02/09/25  8:02 AM   Result Value Ref Range    POCT Glucose 193 (H) 70 - 110 mg/dL   POCT glucose    Collection Time: 02/09/25 12:15 PM   Result Value Ref Range    POCT Glucose 225 (H) 70 - 110 mg/dL   POCT glucose    Collection Time: 02/09/25  4:33 PM   Result Value Ref Range    POCT Glucose 184 (H) 70 - 110 mg/dL   POCT glucose    Collection Time: 02/09/25  8:40 PM   Result Value Ref Range    POCT Glucose 172 (H) 70 - 110 mg/dL   POCT glucose    Collection Time: 02/10/25  3:40 AM   Result Value Ref Range    POCT Glucose 167 (H) 70 - 110 mg/dL   Comprehensive Metabolic Panel (CMP)    Collection Time: 02/10/25  6:21 AM   Result Value Ref Range    Sodium 139 136 - 145 mmol/L    Potassium 3.8 3.5 - 5.1 mmol/L    Chloride 111 (H) 95 -  110 mmol/L    CO2 19 (L) 23 - 29 mmol/L    Glucose 174 (H) 70 - 110 mg/dL    BUN 19 8 - 23 mg/dL    Creatinine 0.8 0.5 - 1.4 mg/dL    Calcium 8.5 (L) 8.7 - 10.5 mg/dL    Total Protein 6.4 6.0 - 8.4 g/dL    Albumin 3.2 (L) 3.5 - 5.2 g/dL    Total Bilirubin 0.4 0.1 - 1.0 mg/dL    Alkaline Phosphatase 85 40 - 150 U/L    AST 21 10 - 40 U/L    ALT 19 10 - 44 U/L    eGFR >60.0 >60 mL/min/1.73 m^2    Anion Gap 9 8 - 16 mmol/L   CBC with Automated Differential    Collection Time: 02/10/25  6:21 AM   Result Value Ref Range    WBC 8.16 3.90 - 12.70 K/uL    RBC 4.14 4.00 - 5.40 M/uL    Hemoglobin 12.3 12.0 - 16.0 g/dL    Hematocrit 39.5 37.0 - 48.5 %    MCV 95 82 - 98 fL    MCH 29.7 27.0 - 31.0 pg    MCHC 31.1 (L) 32.0 - 36.0 g/dL    RDW 12.7 11.5 - 14.5 %    Platelets 245 150 - 450 K/uL    MPV 11.4 9.2 - 12.9 fL    Immature Granulocytes 0.5 0.0 - 0.5 %    Gran # (ANC) 5.0 1.8 - 7.7 K/uL    Immature Grans (Abs) 0.04 0.00 - 0.04 K/uL    Lymph # 2.1 1.0 - 4.8 K/uL    Mono # 0.6 0.3 - 1.0 K/uL    Eos # 0.4 0.0 - 0.5 K/uL    Baso # 0.04 0.00 - 0.20 K/uL    nRBC 0 0 /100 WBC    Gran % 61.0 38.0 - 73.0 %    Lymph % 25.9 18.0 - 48.0 %    Mono % 7.7 4.0 - 15.0 %    Eosinophil % 4.4 0.0 - 8.0 %    Basophil % 0.5 0.0 - 1.9 %    Differential Method Automated    POCT glucose    Collection Time: 02/10/25  8:00 AM   Result Value Ref Range    POCT Glucose 183 (H) 70 - 110 mg/dL   POCT glucose    Collection Time: 02/10/25 12:02 PM   Result Value Ref Range    POCT Glucose 198 (H) 70 - 110 mg/dL   POCT glucose    Collection Time: 02/10/25  3:55 PM   Result Value Ref Range    POCT Glucose 197 (H) 70 - 110 mg/dL         DOREEN Live  [facility]

## 2025-02-17 ENCOUNTER — PATIENT OUTREACH (OUTPATIENT)
Dept: ADMINISTRATIVE | Facility: HOSPITAL | Age: 65
End: 2025-02-17
Payer: MEDICAID

## 2025-02-17 NOTE — LETTER
AUTHORIZATION FOR RELEASE OF   CONFIDENTIAL INFORMATION        We are seeing Lisa Hendricks, date of birth 1960, in the clinic at [facility]. Zainab Francis NP is the patient's PCP. Lisa Hendricks has an outstanding lab/procedure at the time we reviewed her chart. In order to help keep her health information updated, she has authorized us to request the following medical record(s):        (  )  MAMMOGRAM                                      (  )  COLONOSCOPY      (  )  PAP SMEAR                                          (  )  OUTSIDE LAB RESULTS     (  )  DEXA SCAN                                          ( x )  EYE EXAM            (  )  FOOT EXAM                                          (  )  ENTIRE RECORD     (  )  OUTSIDE IMMUNIZATIONS                 (  )  _______________         Please fax records to Zainab Francis NP, [phone]      If you have any questions, please contact [facility] at [phone].           Patient Name: Lisa Hendricks  : 1960  Patient Phone #: [phone]

## 2025-02-17 NOTE — LETTER
AUTHORIZATION FOR RELEASE OF   CONFIDENTIAL INFORMATION        We are seeing Lisa Hendricks, date of birth 1960, in the clinic at [facility]. Zainab Francis NP is the patient's PCP. Lisa Hendricks has an outstanding lab/procedure at the time we reviewed her chart. In order to help keep her health information updated, she has authorized us to request the following medical record(s):        (  )  MAMMOGRAM                                      (  )  COLONOSCOPY      (  )  PAP SMEAR                                          (  )  OUTSIDE LAB RESULTS     (  )  DEXA SCAN                                          (  )  EYE EXAM            ( x )  FOOT EXAM                                          (  )  ENTIRE RECORD     (  )  OUTSIDE IMMUNIZATIONS                 (  )  _______________         Please fax records to Zainab Francis NP, [phone]      If you have any questions, please contact [facility] at [phone].           Patient Name: Lisa Hendricks  : 1960  Patient Phone #: [phone]

## 2025-02-20 DIAGNOSIS — R80.9 TYPE 2 DIABETES MELLITUS WITH MICROALBUMINURIA, WITH LONG-TERM CURRENT USE OF INSULIN: Primary | ICD-10-CM

## 2025-02-20 DIAGNOSIS — Z79.4 TYPE 2 DIABETES MELLITUS WITH MICROALBUMINURIA, WITH LONG-TERM CURRENT USE OF INSULIN: Primary | ICD-10-CM

## 2025-02-20 DIAGNOSIS — E11.29 TYPE 2 DIABETES MELLITUS WITH MICROALBUMINURIA, WITH LONG-TERM CURRENT USE OF INSULIN: Primary | ICD-10-CM

## 2025-02-20 RX ORDER — INSULIN GLARGINE 100 [IU]/ML
22 INJECTION, SOLUTION SUBCUTANEOUS DAILY
Qty: 9 ML | Refills: 11 | Status: SHIPPED | OUTPATIENT
Start: 2025-02-20

## 2025-02-25 ENCOUNTER — HOSPITAL ENCOUNTER (OUTPATIENT)
Dept: RADIOLOGY | Facility: HOSPITAL | Age: 65
Discharge: HOME | End: 2025-02-25
Payer: MEDICAID

## 2025-02-25 ENCOUNTER — OFFICE VISIT (OUTPATIENT)
Dept: NEUROSURGERY | Facility: CLINIC | Age: 65
End: 2025-02-25
Payer: MEDICAID

## 2025-02-25 ENCOUNTER — TELEPHONE (OUTPATIENT)
Dept: ENDOCRINOLOGY | Facility: CLINIC | Age: 65
End: 2025-02-25
Payer: MEDICAID

## 2025-02-25 VITALS — DIASTOLIC BLOOD PRESSURE: 66 MMHG | HEART RATE: 66 BPM | SYSTOLIC BLOOD PRESSURE: 143 MMHG

## 2025-02-25 DIAGNOSIS — S06.6X0A SUBARACHNOID HEMORRHAGE FOLLOWING INJURY, NO LOSS OF CONSCIOUSNESS, INITIAL ENCOUNTER: ICD-10-CM

## 2025-02-25 DIAGNOSIS — H93.11 TINNITUS OF RIGHT EAR: Primary | ICD-10-CM

## 2025-02-25 DIAGNOSIS — I60.9 SUBARACHNOID BLEED: ICD-10-CM

## 2025-02-25 PROCEDURE — 99999 PR PBB SHADOW E&M-EST. PATIENT-LVL III: CPT | Mod: PBBFAC,,, | Performed by: PHYSICIAN ASSISTANT

## 2025-02-25 PROCEDURE — 70450 CT HEAD/BRAIN W/O DYE: CPT | Mod: 26,,, | Performed by: RADIOLOGY

## 2025-02-25 PROCEDURE — 70450 CT HEAD/BRAIN W/O DYE: CPT | Mod: TC

## 2025-02-25 PROCEDURE — 1111F DSCHRG MED/CURRENT MED MERGE: CPT | Mod: CPTII,,, | Performed by: PHYSICIAN ASSISTANT

## 2025-02-25 PROCEDURE — 99213 OFFICE O/P EST LOW 20 MIN: CPT | Mod: PBBFAC,25 | Performed by: PHYSICIAN ASSISTANT

## 2025-02-25 PROCEDURE — 1159F MED LIST DOCD IN RCRD: CPT | Mod: CPTII,,, | Performed by: PHYSICIAN ASSISTANT

## 2025-02-25 PROCEDURE — 3078F DIAST BP <80 MM HG: CPT | Mod: CPTII,,, | Performed by: PHYSICIAN ASSISTANT

## 2025-02-25 PROCEDURE — 3077F SYST BP >= 140 MM HG: CPT | Mod: CPTII,,, | Performed by: PHYSICIAN ASSISTANT

## 2025-02-25 PROCEDURE — 3046F HEMOGLOBIN A1C LEVEL >9.0%: CPT | Mod: CPTII,,, | Performed by: PHYSICIAN ASSISTANT

## 2025-02-25 PROCEDURE — 99213 OFFICE O/P EST LOW 20 MIN: CPT | Mod: S$PBB,,, | Performed by: PHYSICIAN ASSISTANT

## 2025-02-25 NOTE — TELEPHONE ENCOUNTER
----- Message from Kinga Macias PA-C sent at 2/20/2025  3:12 PM CST -----  Regarding: RE: Dexcom  Please start lantus 22 units once daily  ----- Message -----  From: Patsy Gibson MA  Sent: 2/20/2025   3:02 PM CST  To: Kinga Macias PA-C  Subject: RE: Dexcom                                       Spoke with patient, okay to take Lantus unsure about Toujeo  ----- Message -----  From: Kinga Macias PA-C  Sent: 2/19/2025   7:58 AM CST  To: [facility] Staff  Subject: FW: Dexcom                                       Dexcom reviewed. I would like her to start a long acting insulin, but after reviewing her chart I see that she had an allergic reaction to Levemir in the past. I have it documented that she has taken Lantus and Toujeo and do not see any reactions in her chart, but I want to confirm that she did not have allergic reactions to Lantus or Toujeo.  ----- Message -----  From: Kinga Macias PA-C  Sent: 2/19/2025  12:00 AM CST  To: Kinga Macias PA-C  Subject: Collin

## 2025-02-25 NOTE — PROGRESS NOTES
Neurosurgery  Established Patient    SUBJECTIVE:     History of Present Illness:  Lisa Hendricks is a 65 y.o. female who presents to clinic today for follow up of tSAH s/p mechanical fall. Patient was seen after she fell from her bed and hit her head. She was on DAPT for stroke years previously. No LOC. CTH demonstrated small tSAH in R frontal and L occipital lobes. Patient reports she is doing well and denies any headaches. She does reports R tinnitus which was present prior to fall, but has gotten worse since the fall.     Review of patient's allergies indicates:   Allergen Reactions    Levemir [insulin detemir] Other (See Comments)     Sob rash to arms and chest  abd cramping diarrhea    Lisinopril Swelling    Metformin      Other reaction(s): severe gi upset    Shellfish containing products      Other^asthma    Statins-hmg-coa reductase inhibitors      Leg cramps    Tramadol hcl      Other reaction(s): anaphylaxis    Losartan Other (See Comments)     Muscle cramp    Zetia [ezetimibe] Nausea Only       Current Medications[1]    Past Medical History:   Diagnosis Date    Asthma     Gout, unspecified      Past Surgical History:   Procedure Laterality Date     SECTION  , ,  1985     x3    CHOLECYSTECTOMY  2011    COLONOSCOPY N/A 2017    Procedure: COLONOSCOPY;  Surgeon: Luis Bogran-Reyes, MD;  Location: [facility];  Service: Endoscopy;  Laterality: N/A;    HYSTERECTOMY      for uterine cancer    OOPHORECTOMY       Family History       Problem Relation (Age of Onset)    Alzheimer's disease Mother    Breast cancer Maternal Aunt    Diabetes Maternal Grandmother    Heart attack Father    Hypertension Mother, Father    No Known Problems Sister, Daughter, Maternal Uncle, Paternal Aunt, Paternal Uncle, Maternal Grandfather, Paternal Grandmother, Paternal Grandfather, Other    Stroke Mother          Social History     Socioeconomic History    Marital status:     Number of children: 1  "  Tobacco Use    Smoking status: Former     Current packs/day: 0.00     Types: Cigarettes     Quit date: 1979     Years since quittin.0     Passive exposure: Past    Smokeless tobacco: Never   Substance and Sexual Activity    Alcohol use: No    Drug use: Never    Sexual activity: Not Currently     Partners: Male     Birth control/protection: Surgical   Social History Narrative    Her son lives with her.  She has long term care Tuesday-Friday 7:30-1:30  for bathing, dressing, [facility]  up in her house for her washes her clothes and dishes, feeds her   [facility]" in [place], [place]  ----The son helps with supper and after her long term care leaves at 1:30     Social Drivers of Health     Financial Resource Strain: Low Risk  (2025)    Overall Financial Resource Strain (CARDIA)     Difficulty of Paying Living Expenses: Not very hard   Food Insecurity: No Food Insecurity (2025)    Hunger Vital Sign     Worried About Running Out of Food in the Last Year: Never true     Ran Out of Food in the Last Year: Never true   Transportation Needs: No Transportation Needs (2025)    PRAPARE - Transportation     Lack of Transportation (Medical): No     Lack of Transportation (Non-Medical): No   Physical Activity: Unknown (2025)    Exercise Vital Sign     Days of Exercise per Week: 0 days   Stress: No Stress Concern Present (2025)    [place] [employer] - Occupational Stress Questionnaire     Feeling of Stress : Not at all   Housing Stability: Low Risk  (2025)    Housing Stability Vital Sign     Unable to Pay for Housing in the Last Year: No     Number of Times Moved in the Last Year: 0     Homeless in the Last Year: No       Review of Systems    OBJECTIVE:     Vital Signs  Pulse: 66  BP: (!) 143/66  Pain Score:   3  There is no height or weight on file to calculate BMI.    Neurosurgery Physical Exam  General: well developed, well nourished, no distress. "   Head: normocephalic, R temporal hematoma with ecchymosis noted  Neurologic: Alert and oriented. Thought content appropriate.  GCS: Motor: 6/Verbal: 5/Eyes: 4 GCS Total: 15  Mental Status: Awake, Alert, Oriented x 4  Language: No aphasia  Speech: No dysarthria  Cranial nerves: face symmetric, CN II-XII grossly intact.   Eyes: EOMI.   Pulmonary: normal respirations, no signs of respiratory distress  Skin: Skin is warm, dry and intact.  Sensory: intact to light touch throughout  Motor Strength:Moves all extremities spontaneously with good tone. No abnormal movements seen.   Pronator drift: absent bilaterally  Seated in wheelchair         Diagnostic Results:  I have personally reviewed imaging and agree with the findings    CT Head Without Contrast  Narrative: EXAMINATION:  CT HEAD WITHOUT CONTRAST    CLINICAL HISTORY:  subarachnoid hemorrhage follow up; Traumatic subarachnoid hemorrhage without loss of consciousness, initial encounter    TECHNIQUE:  Low dose axial CT images obtained throughout the head without the use of intravenous contrast.  Axial, sagittal and coronal reconstructions were performed.    COMPARISON:  02/08/2025    FINDINGS:  Intracranial compartment:    Small subdural collection extends over the right cerebral convexity.  This measures on the order of 0.6 cm in maximal thickness over the frontal region.  Collection only slightly more attenuating than the adjacent CSF.  No hyperattenuating components to suggest recent blood products.  Similar appearing low attenuation subdural collection extending over the left cerebral convexity.  This also measures on the order of 0.6 cm in maximal thickness over the parietal region.  There is no significant intracranial mass effect.    Prior right frontal subarachnoid hemorrhage is no longer apparent.  No new extra-axial blood or fluid collections elsewhere.    Ventricles are stable in size, for age without evidence of hydrocephalus.  No midline shift.    Left  frontal encephalomalacia as before.  No new parenchymal hemorrhage, edema or major vascular distribution infarct.    Skull/extracranial contents (limited evaluation):    Persistent right frontal extracranial soft tissue swelling/hematoma.  No new displaced calvarial fracture.    The mastoid air cells and visualized paranasal sinuses are essentially clear.  Impression: Interval resolution of prior right frontal subarachnoid hemorrhage.    Small low attenuation subdural collections over both cerebral convexities as further described above.    Moderate-sized remote left frontal infarct.    Persistent right frontal extracranial hematoma without evidence of underlying fracture.    This report was flagged in Epic as abnormal.    Electronically signed by: Bakari Madden MD  Date:    02/25/2025  Time:    13:26      ASSESSMENT/PLAN:     65 y.o. female who presents to clinic today for follow up of tSAH s/p mechanical fall. Patient was seen after she fell from her bed and hit her head. Previous R frontal and L occipital tSAH have since resolved on repeat imaging. Patient has R tinnitus which has gotten worse since the fall. No skull fracture seen on imaging. Denies otorrhea.     -Refer to ENT  -Follow up with NSGY PRN, okay to restart DAPT.     Ioana Foster PA-C  Neurosurgery   [facility]    Time spent on this encounter: 24 minutes. This includes face-to-face time and non-face to face time preparing to see the patient (eg, review of tests), obtaining and/or reviewing separately obtained history, documenting clinical information in the electronic or other health record, independently interpreting results and communicating results to the patient/family/caregiver, or care coordinator     Note dictated with voice recognition software, please excuse any grammatical errors.             [1]   Current Outpatient Medications   Medication Sig Dispense Refill    albuterol (PROVENTIL/VENTOLIN HFA) 90  "mcg/actuation inhaler Inhale 2 puffs into the lungs every 6 (six) hours as needed for Wheezing. Rescue      amitriptyline (ELAVIL) 25 MG tablet Take 1 tablet (25 mg total) by mouth every evening. 90 tablet 3    amLODIPine (NORVASC) 10 MG tablet TAKE 1 TABLET(10 MG) BY MOUTH EVERY DAY 90 tablet 3    azilsartan medoxomiL (EDARBI) 80 mg Tab Take 80 mg by mouth once daily. 90 tablet 3    blood sugar diagnostic Strp 1 strip by Misc.(Non-Drug; Combo Route) route 3 (three) times daily.      blood-glucose meter,continuous (DEXCOM G7 ) Misc Use to check glucose with sensors 1 each 0    DEXCOM G7 SENSOR Meli 1 Device by Misc.(Non-Drug; Combo Route) route every 10 days. 9 each 3    gabapentin (NEURONTIN) 300 MG capsule TAKE 1 CAPSULE(300 MG) BY MOUTH EVERY EVENING 30 capsule 11    insulin aspart U-100 (NOVOLOG) 100 unit/mL (3 mL) InPn pen Inject 30 Units into the skin 3 (three) times daily with meals. 9 mL 2    insulin glargine U-100, Lantus, (LANTUS SOLOSTAR U-100 INSULIN) 100 unit/mL (3 mL) InPn pen Inject 22 Units into the skin once daily. 9 mL 11    insulin needles, disposable, 32 x 1/4 " Ndle 1 Device by Misc.(Non-Drug; Combo Route) route 4 (four) times daily.      JARDIANCE 25 mg tablet Take 1 tablet (25 mg total) by mouth once daily. 30 tablet 11    LANCETS MISC 1 Act by Misc.(Non-Drug; Combo Route) route 3 (three) times daily.      loratadine (CLARITIN) 10 mg tablet Take 10 mg by mouth once daily.      metoprolol tartrate (LOPRESSOR) 25 MG tablet Take 1 tablet (25 mg total) by mouth 2 (two) times daily with meals. 180 tablet 3    aspirin (ECOTRIN) 81 MG EC tablet Take 1 tablet (81 mg total) by mouth once daily. HOLD until CT scan reviewed in 2 weeks (Patient not taking: Reported on 2/25/2025) 360 tablet 0    levETIRAcetam (KEPPRA) 500 MG Tab Take 1 tablet (500 mg total) by mouth 2 (two) times daily. for 5 days 10 tablet 0     No current facility-administered medications for this visit.     "

## 2025-02-28 NOTE — TELEPHONE ENCOUNTER
Health Maintenance Due   Topic     TETANUS VACCINE  Patient advised to go to pharmacy    Shingles Vaccine (1 of 2) Hx of Chicken Pox. Patient advised to go to pharmacy    RSV Vaccine (Age 60+ and Pregnant patients) (1 - 1-dose 75+ series) Not offered at this facility    Diabetes Urine Screening  Consult PCP    COVID-19 Vaccine (5 - 2024-25 season) Patient advised to go to pharmacy    DEXA Scan      Hemoglobin A1c  Consult PCP          Please see the attached refill request.

## 2025-03-05 ENCOUNTER — OFFICE VISIT (OUTPATIENT)
Dept: ENDOCRINOLOGY | Facility: CLINIC | Age: 65
End: 2025-03-05
Payer: MEDICAID

## 2025-03-05 VITALS
RESPIRATION RATE: 18 BRPM | HEIGHT: 64 IN | OXYGEN SATURATION: 98 % | HEART RATE: 67 BPM | SYSTOLIC BLOOD PRESSURE: 133 MMHG | DIASTOLIC BLOOD PRESSURE: 62 MMHG | WEIGHT: 212 LBS | BODY MASS INDEX: 36.19 KG/M2

## 2025-03-05 DIAGNOSIS — E66.01 CLASS 2 SEVERE OBESITY DUE TO EXCESS CALORIES WITH SERIOUS COMORBIDITY AND BODY MASS INDEX (BMI) OF 36.0 TO 36.9 IN ADULT: ICD-10-CM

## 2025-03-05 DIAGNOSIS — R80.9 TYPE 2 DIABETES MELLITUS WITH MICROALBUMINURIA, WITH LONG-TERM CURRENT USE OF INSULIN: Primary | ICD-10-CM

## 2025-03-05 DIAGNOSIS — E66.812 CLASS 2 SEVERE OBESITY DUE TO EXCESS CALORIES WITH SERIOUS COMORBIDITY AND BODY MASS INDEX (BMI) OF 36.0 TO 36.9 IN ADULT: ICD-10-CM

## 2025-03-05 DIAGNOSIS — Z79.4 TYPE 2 DIABETES MELLITUS WITH MICROALBUMINURIA, WITH LONG-TERM CURRENT USE OF INSULIN: Primary | ICD-10-CM

## 2025-03-05 DIAGNOSIS — E11.29 TYPE 2 DIABETES MELLITUS WITH MICROALBUMINURIA, WITH LONG-TERM CURRENT USE OF INSULIN: Primary | ICD-10-CM

## 2025-03-05 DIAGNOSIS — E78.2 MIXED HYPERLIPIDEMIA: ICD-10-CM

## 2025-03-05 PROCEDURE — 99999 PR PBB SHADOW E&M-EST. PATIENT-LVL III: CPT | Mod: PBBFAC,,, | Performed by: PHYSICIAN ASSISTANT

## 2025-03-05 PROCEDURE — 99213 OFFICE O/P EST LOW 20 MIN: CPT | Mod: PBBFAC | Performed by: PHYSICIAN ASSISTANT

## 2025-03-05 RX ORDER — INSULIN GLARGINE 100 [IU]/ML
26 INJECTION, SOLUTION SUBCUTANEOUS DAILY
Qty: 9 ML | Refills: 11 | Status: SHIPPED | OUTPATIENT
Start: 2025-03-05

## 2025-03-05 NOTE — PATIENT INSTRUCTIONS
Increase Lantus to 26 units once daily     Continue Novolog 20 units 10 minutes before meals     Continue Jardiance

## 2025-03-05 NOTE — PROGRESS NOTES
"Subjective:      Patient ID: Lisa Hendricks is a 65 y.o. female.    Chief Complaint:  Diabetes    History of Present Illness  This is a 65 y.o. female. with a past medical history of HTN, DM2 on insulin, CVA with R sided weakness here for DM2.    Type 2 diabetes mellitus    Current diabetes medications:  - Jardiance 25 mg daily  - Novolog 15-30 U ACTID- typically given right after meals   - Lantus 22 units once daily     Past diabetes medications:  - Trulicity - GI intolerance  - Toujeo  - Janumet XR 50/1,000 - GI upset  - Metformin - GI upset  - Lantus  - Mounjaro - Gi intolerance     Lab Results   Component Value Date    CREATININE 0.8 02/10/2025    EGFRNORACEVR >60.0 02/10/2025     Known diabetic complications: cerebrovascular disease    Weight trend:  Wt Readings from Last 8 Encounters:   03/05/25 96.2 kg (212 lb)   02/10/25 96.2 kg (212 lb 1.3 oz)   02/08/25 104.3 kg (230 lb)   06/13/24 104.3 kg (230 lb)   03/13/24 104.3 kg (230 lb)   11/09/23 104.3 kg (230 lb)   10/24/23 104.3 kg (230 lb)   04/12/23 103 kg (227 lb)     Family history of diabetes:  Yes    Prior visit with diabetes education: Yes    Current diet: 3 meals per day  Current exercise: Limited    Blood glucose monitoring at home:         Diabetes Management Status  Statin: Taking  ACE/ARB: Not taking    Screening or Prevention Patient's value   HgA1C Testing and Control   Lab Results   Component Value Date    HGBA1C 9.2 (H) 02/08/2025        LDL control Lab Results   Component Value Date    LDLCALC 83.6 02/09/2025      Nephropathy screening Lab Results   Component Value Date    MICALBCREAT 64.4 (H) 10/26/2016        Lab Results   Component Value Date    TSH 4.49 11/06/2020     Last eye exam: : 05/26/2023    Review of Systems  As above    Social and family history reviewed  Current medications and allergies reviewed    Objective:   /62   Pulse 67   Resp 18   Ht 5' 4" (1.626 m)   Wt 96.2 kg (212 lb)   SpO2 98%   BMI 36.39 kg/m²   Physical " Exam   Sitting on wheelchair  Alert, oriented    BP Readings from Last 1 Encounters:   03/05/25 133/62      Wt Readings from Last 1 Encounters:   03/05/25 1044 96.2 kg (212 lb)     Body mass index is 36.39 kg/m².    Lab Review:   Lab Results   Component Value Date    HGBA1C 9.2 (H) 02/08/2025     Lab Results   Component Value Date    CHOL 166 02/09/2025    HDL 35 (L) 02/09/2025    LDLCALC 83.6 02/09/2025    TRIG 237 (H) 02/09/2025    CHOLHDL 21.1 02/09/2025     Lab Results   Component Value Date     02/10/2025    K 3.8 02/10/2025     (H) 02/10/2025    CO2 19 (L) 02/10/2025     (H) 02/10/2025    BUN 19 02/10/2025    CREATININE 0.8 02/10/2025    CALCIUM 8.5 (L) 02/10/2025    PROT 6.4 02/10/2025    ALBUMIN 3.2 (L) 02/10/2025    BILITOT 0.4 02/10/2025    ALKPHOS 85 02/10/2025    AST 21 02/10/2025    ALT 19 02/10/2025    ANIONGAP 9 02/10/2025    ESTGFRAFRICA >60.0 02/02/2022    EGFRNONAA >60.0 02/02/2022    TSH 4.49 11/06/2020     All pertinent labs reviewed    Assessment and Plan     Type 2 diabetes mellitus with microalbuminuria, with long-term current use of insulin  Uncontrolled based on A1c 9.2% and TIR 31%. No hypoglycemia. Fasting glucose has improved since starting basal insulin but is still not at goal. Will increase. She is giving between 15 and 30 units of Novolog. When she gives 15 units, it does not look like it is enough. She is also giving after meals. I discussed with patient always taking Novolog before meals. Will stick with 20 units. She understands.     She has had side effects with GLP-1 RA and GLP-1/GIP RA.     Plan   - Increase Lantus to 26 units once daily   - Continue Jardiance 25 mg daily  - Continue Novolog 20 U ACTID- before meals    Follow up 1 month     Class 2 severe obesity with serious comorbidity and body mass index (BMI) of 36.0 to 36.9 in adult  Side effects to GLP-1 RA and GLP-1/GIP RA    Mixed hyperlipidemia  Continue statin      Kinga Macias,  RAMIRO  Endocrinology

## 2025-03-05 NOTE — ASSESSMENT & PLAN NOTE
Uncontrolled based on A1c 9.2% and TIR 31%. No hypoglycemia. Fasting glucose has improved since starting basal insulin but is still not at goal. Will increase. She is giving between 15 and 30 units of Novolog. When she gives 15 units, it does not look like it is enough. She is also giving after meals. I discussed with patient always taking Novolog before meals. Will stick with 20 units. She understands.     She has had side effects with GLP-1 RA and GLP-1/GIP RA.     Plan   - Increase Lantus to 26 units once daily   - Continue Jardiance 25 mg daily  - Continue Novolog 20 U ACTID- before meals    Follow up 1 month

## 2025-03-18 DIAGNOSIS — E11.9 TYPE 2 DIABETES MELLITUS WITHOUT OPHTHALMIC MANIFESTATIONS: ICD-10-CM

## 2025-03-18 RX ORDER — EMPAGLIFLOZIN 25 MG/1
25 TABLET, FILM COATED ORAL DAILY
Qty: 30 TABLET | Refills: 11 | Status: SHIPPED | OUTPATIENT
Start: 2025-03-18

## 2025-04-02 ENCOUNTER — PATIENT OUTREACH (OUTPATIENT)
Dept: ADMINISTRATIVE | Facility: HOSPITAL | Age: 65
End: 2025-04-02
Payer: MEDICAID

## 2025-04-02 ENCOUNTER — OFFICE VISIT (OUTPATIENT)
Dept: ENDOCRINOLOGY | Facility: CLINIC | Age: 65
End: 2025-04-02
Payer: MEDICAID

## 2025-04-02 VITALS
WEIGHT: 212 LBS | DIASTOLIC BLOOD PRESSURE: 63 MMHG | RESPIRATION RATE: 18 BRPM | HEART RATE: 63 BPM | OXYGEN SATURATION: 97 % | BODY MASS INDEX: 36.19 KG/M2 | HEIGHT: 64 IN | SYSTOLIC BLOOD PRESSURE: 132 MMHG

## 2025-04-02 DIAGNOSIS — E66.812 CLASS 2 SEVERE OBESITY DUE TO EXCESS CALORIES WITH SERIOUS COMORBIDITY AND BODY MASS INDEX (BMI) OF 36.0 TO 36.9 IN ADULT: ICD-10-CM

## 2025-04-02 DIAGNOSIS — E11.29 TYPE 2 DIABETES MELLITUS WITH MICROALBUMINURIA, WITH LONG-TERM CURRENT USE OF INSULIN: Primary | ICD-10-CM

## 2025-04-02 DIAGNOSIS — Z79.4 TYPE 2 DIABETES MELLITUS WITH MICROALBUMINURIA, WITH LONG-TERM CURRENT USE OF INSULIN: Primary | ICD-10-CM

## 2025-04-02 DIAGNOSIS — E66.01 CLASS 2 SEVERE OBESITY DUE TO EXCESS CALORIES WITH SERIOUS COMORBIDITY AND BODY MASS INDEX (BMI) OF 36.0 TO 36.9 IN ADULT: ICD-10-CM

## 2025-04-02 DIAGNOSIS — E78.2 MIXED HYPERLIPIDEMIA: ICD-10-CM

## 2025-04-02 DIAGNOSIS — R80.9 TYPE 2 DIABETES MELLITUS WITH MICROALBUMINURIA, WITH LONG-TERM CURRENT USE OF INSULIN: Primary | ICD-10-CM

## 2025-04-02 PROCEDURE — 99214 OFFICE O/P EST MOD 30 MIN: CPT | Mod: PBBFAC | Performed by: PHYSICIAN ASSISTANT

## 2025-04-02 PROCEDURE — 99999 PR PBB SHADOW E&M-EST. PATIENT-LVL IV: CPT | Mod: PBBFAC,,, | Performed by: PHYSICIAN ASSISTANT

## 2025-04-02 NOTE — ASSESSMENT & PLAN NOTE
Uncontrolled based on A1c 9.2% and TIR 3% which worsened since last visit. No hypoglycemia.Fasting not at goal with significant prandial hyperglycemia. Will increase basal and short acting insulin     She has had side effects with GLP-1 RA and GLP-1/GIP RA.     Plan   - Increase Lantus to 30 units once daily   - Continue Jardiance 25 mg daily  - Increase Novologto 26-30 U ACTID- before meals    Follow up 1 month

## 2025-04-02 NOTE — PATIENT INSTRUCTIONS
Increase Lantus to 30 units once daily   Increase Novolog to 26-30 units with meals (30 for the most part)   Continue Jardiance

## 2025-04-02 NOTE — PROGRESS NOTES
"Subjective:      Patient ID: Lisa Hendricks is a 65 y.o. female.    Chief Complaint:  Diabetes    History of Present Illness  This is a 65 y.o. female. with a past medical history of HTN, DM2 on insulin, CVA with R sided weakness here for DM2.    Type 2 diabetes mellitus    Current diabetes medications:  - Jardiance 25 mg daily  - Novolog 20-25 U ACTID  - Lantus 26 units once daily     Past diabetes medications:  - Trulicity - GI intolerance  - Toujeo  - Janumet XR 50/1,000 - GI upset  - Metformin - GI upset  - Lantus  - Mounjaro - Gi intolerance     Lab Results   Component Value Date    CREATININE 0.8 02/10/2025    EGFRNORACEVR >60.0 02/10/2025     Known diabetic complications: cerebrovascular disease    Weight trend:  Wt Readings from Last 8 Encounters:   04/02/25 96.2 kg (212 lb)   03/05/25 96.2 kg (212 lb)   02/10/25 96.2 kg (212 lb 1.3 oz)   02/08/25 104.3 kg (230 lb)   06/13/24 104.3 kg (230 lb)   03/13/24 104.3 kg (230 lb)   11/09/23 104.3 kg (230 lb)   10/24/23 104.3 kg (230 lb)     Family history of diabetes:  Yes    Prior visit with diabetes education: Yes    Current diet: 3 meals per day  Current exercise: Limited    Blood glucose monitoring at home:         Diabetes Management Status  Statin: Taking  ACE/ARB: Not taking    Screening or Prevention Patient's value   HgA1C Testing and Control   Lab Results   Component Value Date    HGBA1C 9.2 (H) 02/08/2025        LDL control Lab Results   Component Value Date    LDLCALC 83.6 02/09/2025      Nephropathy screening Lab Results   Component Value Date    MICALBCREAT 64.4 (H) 10/26/2016        Lab Results   Component Value Date    TSH 4.49 11/06/2020     Last eye exam: : 05/26/2023    Review of Systems  As above    Social and family history reviewed  Current medications and allergies reviewed    Objective:   /63   Pulse 63   Resp 18   Ht 5' 4" (1.626 m)   Wt 96.2 kg (212 lb)   SpO2 97%   BMI 36.39 kg/m²   Physical Exam   Sitting on wheelchair  Alert, " oriented    BP Readings from Last 1 Encounters:   04/02/25 132/63      Wt Readings from Last 1 Encounters:   04/02/25 1010 96.2 kg (212 lb)     Body mass index is 36.39 kg/m².    Lab Review:   Lab Results   Component Value Date    HGBA1C 9.2 (H) 02/08/2025     Lab Results   Component Value Date    CHOL 166 02/09/2025    HDL 35 (L) 02/09/2025    LDLCALC 83.6 02/09/2025    TRIG 237 (H) 02/09/2025    CHOLHDL 21.1 02/09/2025     Lab Results   Component Value Date     02/10/2025    K 3.8 02/10/2025     (H) 02/10/2025    CO2 19 (L) 02/10/2025     (H) 02/10/2025    BUN 19 02/10/2025    CREATININE 0.8 02/10/2025    CALCIUM 8.5 (L) 02/10/2025    PROT 6.4 02/10/2025    ALBUMIN 3.2 (L) 02/10/2025    BILITOT 0.4 02/10/2025    ALKPHOS 85 02/10/2025    AST 21 02/10/2025    ALT 19 02/10/2025    ANIONGAP 9 02/10/2025    ESTGFRAFRICA >60.0 02/02/2022    EGFRNONAA >60.0 02/02/2022    TSH 4.49 11/06/2020     All pertinent labs reviewed    Assessment and Plan     Type 2 diabetes mellitus with microalbuminuria, with long-term current use of insulin  Uncontrolled based on A1c 9.2% and TIR 3% which worsened since last visit. No hypoglycemia.Fasting not at goal with significant prandial hyperglycemia. Will increase basal and short acting insulin     She has had side effects with GLP-1 RA and GLP-1/GIP RA.     Plan   - Increase Lantus to 30 units once daily   - Continue Jardiance 25 mg daily  - Increase Novologto 26-30 U ACTID- before meals    Follow up 1 month     Class 2 severe obesity with serious comorbidity and body mass index (BMI) of 36.0 to 36.9 in adult  Side effects to GLP-1 RA and GLP-1/GIP RA    Mixed hyperlipidemia  Continue statin    Kinga Macias PA-C  Endocrinology

## 2025-04-29 ENCOUNTER — TELEPHONE (OUTPATIENT)
Dept: ENDOCRINOLOGY | Facility: CLINIC | Age: 65
End: 2025-04-29
Payer: MEDICAID

## 2025-04-29 NOTE — TELEPHONE ENCOUNTER
Patient had an apt tm with Kinga, cancelled due to her apt with Kinga on 8/13. She states she has been short on her Novolog and running out about a week before her next refill . Would like to know if you can fix her script so she can have enough for the month.

## 2025-04-30 DIAGNOSIS — E11.9 TYPE 2 DIABETES MELLITUS WITHOUT COMPLICATION: ICD-10-CM

## 2025-05-01 DIAGNOSIS — E11.29 TYPE 2 DIABETES MELLITUS WITH MICROALBUMINURIA, WITH LONG-TERM CURRENT USE OF INSULIN: Primary | ICD-10-CM

## 2025-05-01 DIAGNOSIS — Z79.4 TYPE 2 DIABETES MELLITUS WITH MICROALBUMINURIA, WITH LONG-TERM CURRENT USE OF INSULIN: Primary | ICD-10-CM

## 2025-05-01 DIAGNOSIS — R80.9 TYPE 2 DIABETES MELLITUS WITH MICROALBUMINURIA, WITH LONG-TERM CURRENT USE OF INSULIN: Primary | ICD-10-CM

## 2025-05-02 DIAGNOSIS — Z79.4 TYPE 2 DIABETES MELLITUS WITH MICROALBUMINURIA, WITH LONG-TERM CURRENT USE OF INSULIN: Primary | ICD-10-CM

## 2025-05-02 DIAGNOSIS — E66.9 DIABETES MELLITUS TYPE 2 IN OBESE: ICD-10-CM

## 2025-05-02 DIAGNOSIS — E11.29 TYPE 2 DIABETES MELLITUS WITH MICROALBUMINURIA, WITH LONG-TERM CURRENT USE OF INSULIN: Primary | ICD-10-CM

## 2025-05-02 DIAGNOSIS — E11.69 DIABETES MELLITUS TYPE 2 IN OBESE: ICD-10-CM

## 2025-05-02 DIAGNOSIS — R80.9 TYPE 2 DIABETES MELLITUS WITH MICROALBUMINURIA, WITH LONG-TERM CURRENT USE OF INSULIN: Primary | ICD-10-CM

## 2025-05-02 RX ORDER — INSULIN ASPART 100 [IU]/ML
30 INJECTION, SOLUTION INTRAVENOUS; SUBCUTANEOUS
Qty: 27 ML | Refills: 11 | Status: SHIPPED | OUTPATIENT
Start: 2025-05-02 | End: 2026-04-27

## 2025-05-28 ENCOUNTER — TELEPHONE (OUTPATIENT)
Dept: ENDOCRINOLOGY | Facility: CLINIC | Age: 65
End: 2025-05-28
Payer: MEDICAID

## 2025-05-28 NOTE — TELEPHONE ENCOUNTER
----- Message from Med Assistant Asmita sent at 5/27/2025  2:30 PM CDT -----  Regarding: Medications  Lisa HendricksMRN: [id number]: 1960PCP: Baudilio Francis Phone      [phone] Phone      Not on file.Mobile          [phone]          Not on file.MESSAGE: Pt is requesting clarification on her insulin medication. Please return call @ [phone]. Thanks.

## 2025-05-28 NOTE — TELEPHONE ENCOUNTER
Called and spoke with patient regarding medication. Stated pharmacy was giving her 2 pens a month.     Called and spoke with pharmacy. Medicaid only covers a 21 day supply. Updated script to 30 units daily per chart note from 04/02. Pharmacy will now dispense 3 pens a month for patient,

## 2025-06-20 ENCOUNTER — PATIENT OUTREACH (OUTPATIENT)
Dept: ADMINISTRATIVE | Facility: HOSPITAL | Age: 65
End: 2025-06-20
Payer: MEDICAID

## 2025-06-20 NOTE — PROGRESS NOTES
Portal active: Yes  Chart reviewed, immunization record updated.  New results noted on [facility] or [facility] web site.  Care Everywhere updated.   Patient care coordination note  Next PCP visit Not scheduled  LOV with PCP 02/14/2025    Attempted to contact pt about scheduling a lab and PCP appointment. She canceled appt with PCP in March. She is on the Hg A1c gap report for [facility]. She is also due for a mammogram and eye exam. Left a message for a call back to schedule.

## 2025-06-25 ENCOUNTER — HOSPITAL ENCOUNTER (EMERGENCY)
Facility: HOSPITAL | Age: 65
Discharge: HOME | End: 2025-06-25
Attending: EMERGENCY MEDICINE
Payer: MEDICAID

## 2025-06-25 VITALS
RESPIRATION RATE: 19 BRPM | SYSTOLIC BLOOD PRESSURE: 191 MMHG | HEIGHT: 64 IN | TEMPERATURE: 99 F | BODY MASS INDEX: 37.56 KG/M2 | OXYGEN SATURATION: 98 % | DIASTOLIC BLOOD PRESSURE: 90 MMHG | HEART RATE: 66 BPM | WEIGHT: 220 LBS

## 2025-06-25 DIAGNOSIS — L03.115 CELLULITIS OF RIGHT FOOT: Primary | ICD-10-CM

## 2025-06-25 PROCEDURE — 99284 EMERGENCY DEPT VISIT MOD MDM: CPT

## 2025-06-25 RX ORDER — AMOXICILLIN AND CLAVULANATE POTASSIUM 875; 125 MG/1; MG/1
1 TABLET, FILM COATED ORAL 2 TIMES DAILY
Qty: 20 TABLET | Refills: 0 | Status: SHIPPED | OUTPATIENT
Start: 2025-06-25 | End: 2025-07-05

## 2025-06-25 RX ORDER — MUPIROCIN 20 MG/G
OINTMENT TOPICAL 2 TIMES DAILY
Qty: 15 G | Refills: 1 | Status: SHIPPED | OUTPATIENT
Start: 2025-06-25

## 2025-06-25 NOTE — ED PROVIDER NOTES
"Encounter Date: 2025       History     Chief Complaint   Patient presents with    Foot Swelling     Patient reports having a "callus" under her right big toe that is swollen. Patient c/o pain to the toe.      Presents for chronic callus under right great toe. Has seen podiatry for this but now swollen and some drainage. No fever or trauma noted. Worried about infection.     The history is provided by the patient.     Review of patient's allergies indicates:   Allergen Reactions    Levemir [insulin detemir] Other (See Comments)     Sob rash to arms and chest  abd cramping diarrhea    Lisinopril Swelling    Metformin      Other reaction(s): severe gi upset    Shellfish containing products      Other^asthma    Statins-hmg-coa reductase inhibitors      Leg cramps    Tramadol hcl      Other reaction(s): anaphylaxis    Losartan Other (See Comments)     Muscle cramp    Zetia [ezetimibe] Nausea Only     Past Medical History:   Diagnosis Date    Asthma     Diabetes mellitus     Gout, unspecified     Hypertension     Stroke      Past Surgical History:   Procedure Laterality Date     SECTION  , ,  1985     x3    CHOLECYSTECTOMY  2011    COLONOSCOPY N/A 2017    Procedure: COLONOSCOPY;  Surgeon: Luis Bogran-Reyes, MD;  Location: [facility];  Service: Endoscopy;  Laterality: N/A;    HYSTERECTOMY      for uterine cancer    OOPHORECTOMY       Family History   Problem Relation Name Age of Onset    Alzheimer's disease Mother      Stroke Mother      Hypertension Mother      Heart attack Father      Hypertension Father      No Known Problems Sister 0     No Known Problems Daughter 1     Breast cancer Maternal Aunt      No Known Problems Maternal Uncle      No Known Problems Paternal Aunt      No Known Problems Paternal Uncle      Diabetes Maternal Grandmother      No Known Problems Maternal Grandfather      No Known Problems Paternal Grandmother      No Known Problems Paternal Grandfather      No Known " Problems Other      Ovarian cancer Neg Hx      BRCA 1/2 Neg Hx       Social History[1]  Review of Systems   Skin:  Positive for wound.   All other systems reviewed and are negative.      Physical Exam     Initial Vitals   BP Pulse Resp Temp SpO2   06/25/25 1046 06/25/25 1044 06/25/25 1044 06/25/25 1044 06/25/25 1044   (!) 191/90 66 19 98.7 °F (37.1 °C) 98 %      MAP       --                Physical Exam    Nursing note and vitals reviewed.  Constitutional: She appears well-developed and well-nourished. She is cooperative.   HENT:   Head: Normocephalic and atraumatic.   Right Ear: External ear normal.   Left Ear: External ear normal.   Nose: Nose normal. Mouth/Throat: Oropharynx is clear and moist.   Eyes: Conjunctivae are normal.   Neck: Neck supple.   Normal range of motion.  Cardiovascular:  Normal rate, regular rhythm and intact distal pulses.           Pulmonary/Chest: Breath sounds normal.   Abdominal: Abdomen is soft. Bowel sounds are normal.   Musculoskeletal:      Cervical back: Normal range of motion and neck supple.      Right foot: Swelling and tenderness present.        Feet:       Comments: Callus. No drainage or obvious abscess      Neurological: She is alert and oriented to person, place, and time.   Skin: Skin is warm and dry. Capillary refill takes less than 2 seconds.   Psychiatric: She has a normal mood and affect. Her behavior is normal. Judgment and thought content normal.         ED Course   Procedures  Labs Reviewed - No data to display       Imaging Results    None          Medications - No data to display  Medical Decision Making  Chronic callus under right great toe now with pain and swelling    Differential Dx: Abscess, cellulitis, callus     Amount and/or Complexity of Data Reviewed  Discussion of management or test interpretation with external provider(s): No I&D needed. No concerns for foreign body or trauma reported. Will place on antibiotics and needs to make appointment with  podiatrist. Return to ER or see PCP for any new or worsening issues     Risk  Prescription drug management.                                      Clinical Impression:  Final diagnoses:  [L03.115] Cellulitis of right foot (Primary)          ED Disposition Condition    Discharge Stable          ED Prescriptions       Medication Sig Dispense Start Date End Date Auth. Provider    amoxicillin-clavulanate 875-125mg (AUGMENTIN) 875-125 mg per tablet Take 1 tablet by mouth 2 (two) times daily. for 10 days 20 tablet 2025 Wilfred Contreras NP    mupirocin (BACTROBAN) 2 % ointment Apply topically 2 (two) times daily. 15 g 2025 -- Wilfred Contreras NP          Follow-up Information       Follow up With Specialties Details Why Contact Info    [facility] Podiatry Schedule an appointment as soon as possible for a visit in 1 week As needed, If symptoms worsen, For wound re-check, For further evaluation, Hospital follow-up [address] [facility] [id number]                   [1]   Social History  Tobacco Use    Smoking status: Former     Current packs/day: 0.00     Types: Cigarettes     Quit date: 1979     Years since quittin.4     Passive exposure: Past    Smokeless tobacco: Never   Substance Use Topics    Alcohol use: No    Drug use: Never        Wilfred Contreras NP  [phone]

## 2025-06-26 ENCOUNTER — OFFICE VISIT (OUTPATIENT)
Dept: ENDOCRINOLOGY | Facility: CLINIC | Age: 65
End: 2025-06-26
Payer: MEDICAID

## 2025-06-26 VITALS — BODY MASS INDEX: 37.76 KG/M2 | HEIGHT: 64 IN

## 2025-06-26 DIAGNOSIS — E66.812 CLASS 2 SEVERE OBESITY DUE TO EXCESS CALORIES WITH SERIOUS COMORBIDITY AND BODY MASS INDEX (BMI) OF 36.0 TO 36.9 IN ADULT: ICD-10-CM

## 2025-06-26 DIAGNOSIS — Z86.73 HISTORY OF CVA (CEREBROVASCULAR ACCIDENT): ICD-10-CM

## 2025-06-26 DIAGNOSIS — E66.01 CLASS 2 SEVERE OBESITY DUE TO EXCESS CALORIES WITH SERIOUS COMORBIDITY AND BODY MASS INDEX (BMI) OF 36.0 TO 36.9 IN ADULT: ICD-10-CM

## 2025-06-26 DIAGNOSIS — R80.9 TYPE 2 DIABETES MELLITUS WITH MICROALBUMINURIA, WITH LONG-TERM CURRENT USE OF INSULIN: Primary | ICD-10-CM

## 2025-06-26 DIAGNOSIS — E11.29 TYPE 2 DIABETES MELLITUS WITH MICROALBUMINURIA, WITH LONG-TERM CURRENT USE OF INSULIN: Primary | ICD-10-CM

## 2025-06-26 DIAGNOSIS — Z79.4 TYPE 2 DIABETES MELLITUS WITH MICROALBUMINURIA, WITH LONG-TERM CURRENT USE OF INSULIN: Primary | ICD-10-CM

## 2025-06-26 PROCEDURE — 99999 PR PBB SHADOW E&M-EST. PATIENT-LVL III: CPT | Mod: PBBFAC,,, | Performed by: STUDENT IN AN ORGANIZED HEALTH CARE EDUCATION/TRAINING PROGRAM

## 2025-06-26 PROCEDURE — 99213 OFFICE O/P EST LOW 20 MIN: CPT | Mod: PBBFAC | Performed by: STUDENT IN AN ORGANIZED HEALTH CARE EDUCATION/TRAINING PROGRAM

## 2025-06-26 RX ORDER — INSULIN ASPART 100 [IU]/ML
INJECTION, SOLUTION INTRAVENOUS; SUBCUTANEOUS
Qty: 50 ML | Refills: 11 | Status: SHIPPED | OUTPATIENT
Start: 2025-06-26

## 2025-06-26 RX ORDER — NIFEDIPINE 60 MG/1
60 TABLET, EXTENDED RELEASE ORAL DAILY
COMMUNITY
Start: 2025-06-16

## 2025-06-26 RX ORDER — NEBIVOLOL 20 MG/1
1 TABLET ORAL 2 TIMES DAILY
COMMUNITY
Start: 2025-06-16

## 2025-06-26 NOTE — PROGRESS NOTES
"Subjective:      Patient ID: Lisa Hendricks is a 65 y.o. female.    Chief Complaint:  Follow-up and Diabetes    History of Present Illness  This is a 65 y.o. female. with a past medical history of HTN, DM2 on insulin, CVA with R sided weakness here for DM2.        Type 2 diabetes mellitus    Current diabetes medications:  - Novolog 30 U ACTID  - Lantus 30 units once daily     Past diabetes medications:  - Trulicity - GI intolerance  - Toujeo  - Janumet XR 50/1,000 - GI upset  - Metformin - GI upset  - Lantus  - Mounjaro - Gi intolerance   - Jardiance 25 mg daily - recurrent UTIs    Lab Results   Component Value Date    CREATININE 0.8 02/10/2025    EGFRNORACEVR >60.0 02/10/2025     Known diabetic complications: cerebrovascular disease    Weight trend:  Wt Readings from Last 8 Encounters:   06/25/25 99.8 kg (220 lb)   04/02/25 96.2 kg (212 lb)   03/05/25 96.2 kg (212 lb)   02/10/25 96.2 kg (212 lb 1.3 oz)   02/08/25 104.3 kg (230 lb)   06/13/24 104.3 kg (230 lb)   03/13/24 104.3 kg (230 lb)   11/09/23 104.3 kg (230 lb)     Family history of diabetes:  Yes    Prior visit with diabetes education: Yes    Current diet: 3 meals per day  Current exercise: Limited    Blood glucose monitoring at home:           Diabetes Management Status  Statin: Taking  ACE/ARB: Not taking    Screening or Prevention Patient's value   HgA1C Testing and Control   Lab Results   Component Value Date    HGBA1C 9.2 (H) 02/08/2025        LDL control Lab Results   Component Value Date    LDLCALC 83.6 02/09/2025      Nephropathy screening Lab Results   Component Value Date    MICALBCREAT 64.4 (H) 10/26/2016        Lab Results   Component Value Date    TSH 4.49 11/06/2020     Last eye exam: : 05/26/2023    Review of Systems  As above    Social and family history reviewed  Current medications and allergies reviewed    Objective:   Ht 5' 4" (1.626 m)   BMI 37.76 kg/m²   Physical Exam   Sitting on wheelchair  Alert, oriented    BP Readings from Last 1 " Encounters:   06/25/25 (!) 191/90      Wt Readings from Last 1 Encounters:   06/25/25 1044 99.8 kg (220 lb)     Body mass index is 37.76 kg/m².    Lab Review:   Lab Results   Component Value Date    HGBA1C 9.2 (H) 02/08/2025     Lab Results   Component Value Date    CHOL 166 02/09/2025    HDL 35 (L) 02/09/2025    LDLCALC 83.6 02/09/2025    TRIG 237 (H) 02/09/2025    CHOLHDL 21.1 02/09/2025     Lab Results   Component Value Date     02/10/2025    K 3.8 02/10/2025     (H) 02/10/2025    CO2 19 (L) 02/10/2025     (H) 02/10/2025    BUN 19 02/10/2025    CREATININE 0.8 02/10/2025    CALCIUM 8.5 (L) 02/10/2025    PROT 6.4 02/10/2025    ALBUMIN 3.2 (L) 02/10/2025    BILITOT 0.4 02/10/2025    ALKPHOS 85 02/10/2025    AST 21 02/10/2025    ALT 19 02/10/2025    ANIONGAP 9 02/10/2025    ESTGFRAFRICA >60.0 02/02/2022    EGFRNONAA >60.0 02/02/2022    TSH 4.49 11/06/2020     All pertinent labs reviewed    Assessment and Plan     Type 2 diabetes mellitus with microalbuminuria, with long-term current use of insulin  Glucose uncontrolled with TIR 0% and average glucose 286 on CGM. She has global hyperglycemia and no hypoglycemia.    Patient requires CSII with 0.05 incremental and variable basal/bolus dosing; every 5 minute automated insulin delivery decisions based on CGM value, 1 hour BG trend and set glucose target(s) combined with historical adaptivity/algorithmic decision making to achieve optimum glycemic control.    She has had side effects with SGLT-2 inhibitor, GLP-1 RA and GLP-1/GIP RA.     Plan   - Switch to Novolog via Omnipod 5 - will start with conservative target  Calculated TDD 70 U  Basal rate: 1.45 U/h  ICR: 6.5  ISF: 25  Target: 150-180  IAT: 3h      Follow up 2 months     Class 2 severe obesity with serious comorbidity and body mass index (BMI) of 36.0 to 36.9 in adult  Side effects to GLP-1 RA and GLP-1/GIP RA    History of CVA (cerebrovascular accident)  Unable to tolerate GLP-1 RA with CVA  benefit      Compa Hunter MD  Endocrinology

## 2025-06-26 NOTE — ASSESSMENT & PLAN NOTE
Glucose uncontrolled with TIR 0% and average glucose 286 on CGM. She has global hyperglycemia and no hypoglycemia.    Patient requires CSII with 0.05 incremental and variable basal/bolus dosing; every 5 minute automated insulin delivery decisions based on CGM value, 1 hour BG trend and set glucose target(s) combined with historical adaptivity/algorithmic decision making to achieve optimum glycemic control.    She has had side effects with SGLT-2 inhibitor, GLP-1 RA and GLP-1/GIP RA.     Plan   - Switch to Novolog via Omnipod 5 - will start with conservative target  Calculated TDD 70 U  Basal rate: 1.45 U/h  ICR: 6.5  ISF: 25  Target: 150-180  IAT: 3h      Follow up 2 months

## 2025-06-27 ENCOUNTER — PATIENT OUTREACH (OUTPATIENT)
Dept: ADMINISTRATIVE | Facility: HOSPITAL | Age: 65
End: 2025-06-27
Payer: MEDICAID

## 2025-06-27 NOTE — PROGRESS NOTES
Portal active: 6/25/25  Chart reviewed, immunization record updated.  No new results noted on [facility] or [facility] web site.  Care Everywhere updated.   Patient care coordination note  LOV with PCP 2/14/25  Patient is due for eye exam, mammmogram labs, foot exam patient declines scheduling states she wants to wait until August to make an appt.

## 2025-07-08 ENCOUNTER — CLINICAL SUPPORT (OUTPATIENT)
Facility: CLINIC | Age: 65
End: 2025-07-08
Payer: MEDICAID

## 2025-07-08 DIAGNOSIS — Z79.4 TYPE 2 DIABETES MELLITUS WITH MICROALBUMINURIA, WITH LONG-TERM CURRENT USE OF INSULIN: Primary | ICD-10-CM

## 2025-07-08 DIAGNOSIS — E11.29 TYPE 2 DIABETES MELLITUS WITH MICROALBUMINURIA, WITH LONG-TERM CURRENT USE OF INSULIN: Primary | ICD-10-CM

## 2025-07-08 DIAGNOSIS — R80.9 TYPE 2 DIABETES MELLITUS WITH MICROALBUMINURIA, WITH LONG-TERM CURRENT USE OF INSULIN: Primary | ICD-10-CM

## 2025-07-08 PROCEDURE — 99999PBSHW PR PBB SHADOW TECHNICAL ONLY FILED TO HB: Mod: PBBFAC,,,

## 2025-07-08 PROCEDURE — G0108 DIAB MANAGE TRN  PER INDIV: HCPCS | Mod: PBBFAC

## 2025-07-08 NOTE — PROGRESS NOTES
Diabetes Care Specialist Progress Note  Author: Sherin Swan RN  Date: 7/8/2025    Intake  Program Intake  Reason for Diabetes Program Visit:: Intervention  Type of Intervention:: Individual  Individual: Device Training  Device Training: Insulin Pump Start  Current diabetes risk level:: high  In the last month, have you used the ER or been admitted to the hospital: Yes  Was the ER or hospital admission related to diabetes?: Yes  Permission to speak with others about care:: yes    Current Diabetes Treatment: Insulin  Method of insulin delivery?: Insulin Pump  Type of Pump: Omnipod 5  Does patient have back-up plan?: Yes  Any problems obtaining supplies?: No    Continuous Glucose Monitoring  Patient has CGM: Yes  Personal CGM type:: Dexcom G7    Lab Results   Component Value Date    HGBA1C 9.2 (H) 02/08/2025     Diabetes Self-Management Skills Assessment  Medication Skills Assessment  Patient is able to identify current diabetes medications, dosages, and appropriate timing of medications.: yes  Patient reports problems or concerns with current medication regimen.: no  Patient is  aware that some diabetes medications can cause low blood sugar?: Yes  Medication Skills Assessment Completed:: Yes  Assessment indicates:: Knowledge deficit  Area of need?: Yes    Home Blood Glucose Monitoring  Personal CGM type:: Dexcom G7    Assessment Summary and Plan  Based on today's diabetes care assessment, the following areas of need were identified:      Identified Areas of Need      Medication/Current Diabetes Treatment: Yes   Lifestyle Coping/Support:  Has a medical assistant that comes Tuesday-Friday; lives with her son   Diabetes Disease Process/Treatment Options:     Nutrition/Healthy Eating:   Verbalized eating 100 grams of CHO per meal; discussed eating no more than 45-60 grams per meal   Physical Activity/Exercise:   Pt in wheelchair and has some physical deficits noted to her right arm; needs assistance with Dexcom and  insulin pump changes; Pt verbalized limited mobility with right arm/hand   Home Blood Glucose Monitoring:      Acute Complications:      Chronic Complications:       Today's interventions were provided through individual discussion, instruction, and written materials were provided.      Patient verbalized understanding of instruction and written materials.  Pt was able to return back demonstration of instructions today. Patient understood key points, needs reinforcement and further instruction.     Diabetes Self-Management Care Plan:  Today's Diabetes Self-Management Care Plan was developed with Lisa's input. Lisa has agreed to work toward the following goal(s) to improve his/her overall diabetes control.      Care Plan: Diabetes Management   Updates made since 7/8/2024 12:00 AM        Problem: Medications         Goal: Patient Agrees to use the Omnipod 5 and Dexcom G7 as per MD order.    Start Date: 7/8/2025   Priority: High   Barriers: Knowledge deficit; Physical Limitations   Note:    OMNIPOD 5 INSULIN PUMP START  Pump training was provided per Omni Pod protocol.     Patient understands she will no longer take Lantus or Novolog injections daily.  Details of pump therapy were covered included following: controller features and programming, pod activation, pod site selection and rotation, automatic pod priming and insertion, setting & editing basal rates in manual mode, giving bolus and other features in the set up menu.  Patient demonstrated ability to program controller, activate and insert pod using aseptic technique.  Patient demonstrated ability to program Dexcom transmitter into controller and start automated limited mode.    Instructed pt on use of basic pump features ie...give a bolus, pause insulin, switch from manual to automated mode.  Reviewed features available in manual mode verses automated mode.   Reviewed when and how to use activity function in automated mode.  Reviewed site selection of pods,  rotation of sites and hard stop to change pod every 72 hrs.   Instructed that insulin vial is good out of refrigeration for up to 28-30 days .   Reviewed treatment of hypoglycemia, hyperglycemia; sick day care, DKA, and troubleshooting of pump.  Omni Pod 24 hour support can be reached at [phone].     INITIAL SETTINGS: (per provider MARKUS Hunter MD)    Basal rate: 1.45/hr  Maximum basal: 2.9u/hr     Bolus Menu:  ISF: 1:25  Carb Ratio : 1:6.5  Blood glucose target: 150; correct above: 160  Active insulin: 3 hrs  Maximum bolus = 20 units  Reverse Correction: ON    Low pod insulin: 10 units  Pod expiration alarm:  4 hours    Podder ID username: Susanne  PW: Alfonso Johnson username: [email or web address]  PW: [email or web address]!!    Patient has written materials for Omnipod 5 for home use.  Patient verbalized understanding of all instructions given.  Reviewed back up plan in case of pump malfunction.  Educated that if glucose levels increasing and patient is delivering insulin, it is recommended to change pod.      Lisa came into the clinic today to start her Omnipod with Dexcom G7. She already had a Dexcom senor on and active. Pt then attempted to create an Omnipod Podder ID, but received an error that the account already existed. Called customer support to reset account. After pt account was accessible, pt was able to login to her Omnipod pdm. Pt then downloaded the [email or web address] mobile west onto her phone. She then logged into her account again and was helped to enter the prescribed pump settings. Pt was then walked through the step to connect her Dexcom sensor to her Omnipod west. Pt then asked if her medical assistant that helps her at home you could assist with changing the insulin pod. Both were walked through the steps on how to draw up insulin into a syringe from a vial, fill the pod with insulin, remove the needle guard, and place the pod. The controller was then used to insert the canula and the pump entered  automated mode. Pt was educated on the differences between automated, limited, and manual mode and how to switch modes. Discussed the importance of using automated or limited. Pt was then educated on how to administer a food or correction bolus. Pt stated she counts CHO already and denied needing a review at this time. All other questions and concerns addressed. Pt is to follow up in 1 week for further education.          Follow Up Plan   No follow-ups on file.    Today's care plan and follow up schedule was discussed with patient.  Lisa verbalized understanding of the care plan, goals, and agrees to follow up plan.        The patient was encouraged to communicate with his/her health care provider/physician and care team regarding his/her condition(s) and treatment.  I provided the patient with my contact information today and encouraged to contact me via phone or [facility] Patient Portal as needed.     Length of Visit   Total Time: 90 Minutes

## 2025-07-11 DIAGNOSIS — Z79.4 TYPE 2 DIABETES MELLITUS WITH MICROALBUMINURIA, WITH LONG-TERM CURRENT USE OF INSULIN: Primary | ICD-10-CM

## 2025-07-11 DIAGNOSIS — R80.9 TYPE 2 DIABETES MELLITUS WITH MICROALBUMINURIA, WITH LONG-TERM CURRENT USE OF INSULIN: Primary | ICD-10-CM

## 2025-07-11 DIAGNOSIS — E11.29 TYPE 2 DIABETES MELLITUS WITH MICROALBUMINURIA, WITH LONG-TERM CURRENT USE OF INSULIN: Primary | ICD-10-CM

## 2025-07-11 RX ORDER — INSULIN PMP CART,AUT,G6/7,CNTR
1 EACH SUBCUTANEOUS
Qty: 10 EACH | Refills: 3 | Status: SHIPPED | OUTPATIENT
Start: 2025-07-11

## 2025-07-15 ENCOUNTER — CLINICAL SUPPORT (OUTPATIENT)
Facility: CLINIC | Age: 65
End: 2025-07-15
Payer: MEDICAID

## 2025-07-15 DIAGNOSIS — Z79.4 TYPE 2 DIABETES MELLITUS WITH MICROALBUMINURIA, WITH LONG-TERM CURRENT USE OF INSULIN: Primary | ICD-10-CM

## 2025-07-15 DIAGNOSIS — E11.29 TYPE 2 DIABETES MELLITUS WITH MICROALBUMINURIA, WITH LONG-TERM CURRENT USE OF INSULIN: Primary | ICD-10-CM

## 2025-07-15 DIAGNOSIS — R80.9 TYPE 2 DIABETES MELLITUS WITH MICROALBUMINURIA, WITH LONG-TERM CURRENT USE OF INSULIN: Primary | ICD-10-CM

## 2025-07-15 PROCEDURE — G0108 DIAB MANAGE TRN  PER INDIV: HCPCS | Mod: PBBFAC

## 2025-07-15 PROCEDURE — 99999PBSHW PR PBB SHADOW TECHNICAL ONLY FILED TO HB: Mod: PBBFAC,,,

## 2025-07-15 NOTE — PROGRESS NOTES
Diabetes Care Specialist Progress Note  Author: Sherin Swan RN  Date: 7/15/2025    Intake  Program Intake  Reason for Diabetes Program Visit:: Intervention  Type of Intervention:: Individual  Individual: Device Training  Device Training: Insulin Pump Start  Current diabetes risk level:: high  In the last month, have you used the ER or been admitted to the hospital: Yes  Was the ER or hospital admission related to diabetes?: Yes  Permission to speak with others about care:: yes    Current Diabetes Treatment: Insulin  Method of insulin delivery?: Insulin Pump  Type of Pump: Omnipod 5  Does patient have back-up plan?: Yes  Any problems obtaining supplies?: No    Continuous Glucose Monitoring  Patient has CGM: Yes  Personal CGM type:: Dexcom G7    Lab Results   Component Value Date    HGBA1C 9.2 (H) 02/08/2025         Diabetes Self-Management Skills Assessment  Medication Skills Assessment  Patient is able to identify current diabetes medications, dosages, and appropriate timing of medications.: yes  Patient reports problems or concerns with current medication regimen.: no  Patient is  aware that some diabetes medications can cause low blood sugar?: Yes  Medication Skills Assessment Completed:: Yes  Assessment indicates:: Knowledge deficit  Area of need?: Yes    Home Blood Glucose Monitoring  Personal CGM type:: Dexcom G7    Assessment Summary and Plan  Based on today's diabetes care assessment, the following areas of need were identified:      Identified Areas of Need      Medication/Current Diabetes Treatment: Yes   Lifestyle Coping/Support:     Diabetes Disease Process/Treatment Options:     Nutrition/Healthy Eating:      Physical Activity/Exercise:      Home Blood Glucose Monitoring:      Acute Complications:      Chronic Complications:     Today's interventions were provided through individual discussion, instruction, and written materials were provided.      Patient verbalized understanding of instruction and  written materials.  Pt was able to return back demonstration of instructions today. Patient understood key points, needs reinforcement and further instruction.     Diabetes Self-Management Care Plan:  Today's Diabetes Self-Management Care Plan was developed with Lisa's input. Lisa has agreed to work toward the following goal(s) to improve his/her overall diabetes control.      Care Plan: Diabetes Management   Updates made since 7/15/2024 12:00 AM        Problem: Medications         Goal: Patient Agrees to use the Omnipod 5 and Dexcom G7 as per MD order.    Start Date: 7/8/2025   This Visit's Progress: On track   Priority: High   Barriers: Knowledge deficit; Physical Limitations   Note:    OMNIPOD 5 INSULIN PUMP START  Pump training was provided per Omni Pod protocol.     Patient understands she will no longer take Lantus or Novolog injections daily.  Details of pump therapy were covered included following: controller features and programming, pod activation, pod site selection and rotation, automatic pod priming and insertion, setting & editing basal rates in manual mode, giving bolus and other features in the set up menu.  Patient demonstrated ability to program controller, activate and insert pod using aseptic technique.  Patient demonstrated ability to program Dexcom transmitter into controller and start automated limited mode.    Instructed pt on use of basic pump features ie...give a bolus, pause insulin, switch from manual to automated mode.  Reviewed features available in manual mode verses automated mode.   Reviewed when and how to use activity function in automated mode.  Reviewed site selection of pods, rotation of sites and hard stop to change pod every 72 hrs.   Instructed that insulin vial is good out of refrigeration for up to 28-30 days .   Reviewed treatment of hypoglycemia, hyperglycemia; sick day care, DKA, and troubleshooting of pump.  Omni Pod 24 hour support can be reached at [phone].      INITIAL SETTINGS: (per provider MARKUS Hunter MD)    Basal rate: 1.45/hr  Maximum basal: 2.9u/hr     Bolus Menu:  ISF: 1:25  Carb Ratio : 1:6.5  Blood glucose target: 150; correct above: 160  Active insulin: 3 hrs  Maximum bolus = 20 units  Reverse Correction: ON    Low pod insulin: 10 units  Pod expiration alarm:  4 hours    Podder ID username: Susanne  PW: Alfonso Johnson username: [email or web address]  PW: [email or web address]    Patient has written materials for Omnipod 5 for home use.  Patient verbalized understanding of all instructions given.  Reviewed back up plan in case of pump malfunction.  Educated that if glucose levels increasing and patient is delivering insulin, it is recommended to change pod.      Lisa came into the clinic today to start her Omnipod with Dexcom G7. She already had a Dexcom senor on and active. Pt then attempted to create an Omnipod Podder ID, but received an error that the account already existed. Called customer support to reset account. After pt account was accessible, pt was able to login to her Omnipod pdm. Pt then downloaded the [employer] mobile west onto her phone. She then logged into her account again and was helped to enter the prescribed pump settings. Pt was then walked through the step to connect her Dexcom sensor to her Omnipod west. Pt then asked if her medical assistant that helps her at home you could assist with changing the insulin pod. Both were walked through the steps on how to draw up insulin into a syringe from a vial, fill the pod with insulin, remove the needle guard, and place the pod. The controller was then used to insert the canula and the pump entered automated mode. Pt was educated on the differences between automated, limited, and manual mode and how to switch modes. Discussed the importance of using automated or limited. Pt was then educated on how to administer a food or correction bolus. Pt stated she counts CHO already and denied needing a  review at this time. All other questions and concerns addressed. Pt is to follow up in 1 week for further education.     07/15/2025: Lisa came into the clinic today for an Omnipod 5 F/U. Upload of her report showed an average BG of 231 mg/dL with 17% in range, 48% high, and 35% very high with no hypoglycemic episodes. Pt noted to be without a CGM for 3/8 days and in manual mode due to not connecting her Dexcom to her Omnipod controller. Pt was re-educated on how to connect her Dexcom to her Omnipod controller and the pdm entered automated mode. Pt also noted to eat very high CHO meals. Discussed the differences between simple and complex CHO and the effects on BG levels. Pt stated that she was including phantom CHO in her bolus amounts due to being in manual mode. Discussed the potential dangers of using phantom CHO. All other questions and concerns addressed. Pt was receptive to education and is to f/u in 1 month for BG review and further education.          Follow Up Plan   No follow-ups on file.    Today's care plan and follow up schedule was discussed with patient.  Lisa verbalized understanding of the care plan, goals, and agrees to follow up plan.        The patient was encouraged to communicate with his/her health care provider/physician and care team regarding his/her condition(s) and treatment.  I provided the patient with my contact information today and encouraged to contact me via phone or [facility] Patient Portal as needed.     Length of Visit   Total Time: 30 Minutes

## 2025-07-24 DIAGNOSIS — Z12.31 OTHER SCREENING MAMMOGRAM: ICD-10-CM

## 2025-07-29 ENCOUNTER — PATIENT OUTREACH (OUTPATIENT)
Dept: ADMINISTRATIVE | Facility: HOSPITAL | Age: 65
End: 2025-07-29
Payer: MEDICAID

## 2025-07-29 NOTE — LETTER
AUTHORIZATION FOR RELEASE OF   CONFIDENTIAL INFORMATION        We are seeing Lisa Hendricks, date of birth 1960, in the clinic at [facility]. Zainab Francis NP is the patient's PCP. Lisa Hendricks has an outstanding lab/procedure at the time we reviewed her chart. In order to help keep her health information updated, she has authorized us to request the following medical record(s):                                               ( x )  EYE EXAM              Please fax records to Zainab Francis NP,  at [phone] or email to [email or web address]         Patient Name: Lisa Hendricks  : 1960  Patient Phone #: [phone]

## 2025-08-11 PROBLEM — L03.115 CELLULITIS OF RIGHT FOOT: Status: RESOLVED | Noted: 2025-06-25 | Resolved: 2025-08-11

## 2025-08-21 ENCOUNTER — LAB VISIT (OUTPATIENT)
Dept: LAB | Facility: HOSPITAL | Age: 65
End: 2025-08-21
Attending: NURSE PRACTITIONER
Payer: MEDICAID

## 2025-08-21 ENCOUNTER — HOSPITAL ENCOUNTER (OUTPATIENT)
Dept: RADIOLOGY | Facility: HOSPITAL | Age: 65
Discharge: HOME | End: 2025-08-21
Attending: NURSE PRACTITIONER
Payer: MEDICAID

## 2025-08-21 VITALS — HEIGHT: 64 IN | BODY MASS INDEX: 37.56 KG/M2 | WEIGHT: 220 LBS

## 2025-08-21 DIAGNOSIS — R80.9 TYPE 2 DIABETES MELLITUS WITH MICROALBUMINURIA, WITH LONG-TERM CURRENT USE OF INSULIN: ICD-10-CM

## 2025-08-21 DIAGNOSIS — Z12.31 OTHER SCREENING MAMMOGRAM: ICD-10-CM

## 2025-08-21 DIAGNOSIS — E11.9 TYPE 2 DIABETES MELLITUS WITHOUT COMPLICATION: ICD-10-CM

## 2025-08-21 DIAGNOSIS — E11.29 TYPE 2 DIABETES MELLITUS WITH MICROALBUMINURIA, WITH LONG-TERM CURRENT USE OF INSULIN: ICD-10-CM

## 2025-08-21 DIAGNOSIS — Z79.4 TYPE 2 DIABETES MELLITUS WITH MICROALBUMINURIA, WITH LONG-TERM CURRENT USE OF INSULIN: ICD-10-CM

## 2025-08-21 LAB
ALBUMIN/CREAT UR: 333.8 UG/MG
CREAT UR-MCNC: 64.7 MG/DL (ref 15–325)
EAG (OHS): 209 MG/DL (ref 68–131)
HBA1C MFR BLD: 8.9 % (ref 4–5.6)
MICROALBUMIN UR-MCNC: 216 UG/ML

## 2025-08-21 PROCEDURE — 36415 COLL VENOUS BLD VENIPUNCTURE: CPT

## 2025-08-21 PROCEDURE — 82570 ASSAY OF URINE CREATININE: CPT

## 2025-08-21 PROCEDURE — 77067 SCR MAMMO BI INCL CAD: CPT | Mod: TC

## 2025-08-21 PROCEDURE — 83036 HEMOGLOBIN GLYCOSYLATED A1C: CPT

## 2025-08-26 ENCOUNTER — CLINICAL SUPPORT (OUTPATIENT)
Facility: CLINIC | Age: 65
End: 2025-08-26
Payer: MEDICAID

## 2025-08-26 DIAGNOSIS — E11.29 TYPE 2 DIABETES MELLITUS WITH MICROALBUMINURIA, WITH LONG-TERM CURRENT USE OF INSULIN: Primary | ICD-10-CM

## 2025-08-26 DIAGNOSIS — Z79.4 TYPE 2 DIABETES MELLITUS WITH MICROALBUMINURIA, WITH LONG-TERM CURRENT USE OF INSULIN: Primary | ICD-10-CM

## 2025-08-26 DIAGNOSIS — Z79.4 TYPE 2 DIABETES MELLITUS WITH MICROALBUMINURIA, WITH LONG-TERM CURRENT USE OF INSULIN: ICD-10-CM

## 2025-08-26 DIAGNOSIS — R80.9 TYPE 2 DIABETES MELLITUS WITH MICROALBUMINURIA, WITH LONG-TERM CURRENT USE OF INSULIN: ICD-10-CM

## 2025-08-26 DIAGNOSIS — R80.9 TYPE 2 DIABETES MELLITUS WITH MICROALBUMINURIA, WITH LONG-TERM CURRENT USE OF INSULIN: Primary | ICD-10-CM

## 2025-08-26 DIAGNOSIS — E11.29 TYPE 2 DIABETES MELLITUS WITH MICROALBUMINURIA, WITH LONG-TERM CURRENT USE OF INSULIN: ICD-10-CM

## 2025-08-26 PROCEDURE — 99999PBSHW PR PBB SHADOW TECHNICAL ONLY FILED TO HB: Mod: PBBFAC,,,

## 2025-08-26 PROCEDURE — G0108 DIAB MANAGE TRN  PER INDIV: HCPCS | Mod: PBBFAC

## 2025-08-26 RX ORDER — INSULIN PMP CART,AUT,G6/7,CNTR
1 EACH SUBCUTANEOUS
Qty: 15 EACH | Refills: 11 | Status: SHIPPED | OUTPATIENT
Start: 2025-08-26

## 2025-08-28 ENCOUNTER — OFFICE VISIT (OUTPATIENT)
Dept: PRIMARY CARE CLINIC | Facility: CLINIC | Age: 65
End: 2025-08-28
Payer: MEDICAID

## 2025-08-28 VITALS
DIASTOLIC BLOOD PRESSURE: 67 MMHG | WEIGHT: 230 LBS | OXYGEN SATURATION: 95 % | HEIGHT: 64 IN | TEMPERATURE: 99 F | BODY MASS INDEX: 39.27 KG/M2 | HEART RATE: 64 BPM | SYSTOLIC BLOOD PRESSURE: 154 MMHG | RESPIRATION RATE: 18 BRPM

## 2025-08-28 DIAGNOSIS — Z11.4 ENCOUNTER FOR SCREENING FOR HIV: ICD-10-CM

## 2025-08-28 DIAGNOSIS — E11.29 TYPE 2 DIABETES MELLITUS WITH MICROALBUMINURIA, WITH LONG-TERM CURRENT USE OF INSULIN: ICD-10-CM

## 2025-08-28 DIAGNOSIS — R80.9 TYPE 2 DIABETES MELLITUS WITH MICROALBUMINURIA, WITH LONG-TERM CURRENT USE OF INSULIN: ICD-10-CM

## 2025-08-28 DIAGNOSIS — Z13.29 THYROID DISORDER SCREENING: ICD-10-CM

## 2025-08-28 DIAGNOSIS — Z09 FOLLOW-UP EXAM: Primary | ICD-10-CM

## 2025-08-28 DIAGNOSIS — Z13.0 SCREENING FOR IRON DEFICIENCY ANEMIA: ICD-10-CM

## 2025-08-28 DIAGNOSIS — E55.9 VITAMIN D DEFICIENCY: ICD-10-CM

## 2025-08-28 DIAGNOSIS — E78.2 MIXED HYPERLIPIDEMIA: ICD-10-CM

## 2025-08-28 DIAGNOSIS — Z79.4 TYPE 2 DIABETES MELLITUS WITH MICROALBUMINURIA, WITH LONG-TERM CURRENT USE OF INSULIN: ICD-10-CM

## 2025-08-28 LAB
25(OH)D3+25(OH)D2 SERPL-MCNC: 31 NG/ML (ref 30–96)
ABSOLUTE EOSINOPHIL (OHS): 0.34 K/UL
ABSOLUTE MONOCYTE (OHS): 0.69 K/UL (ref 0.3–1)
ABSOLUTE NEUTROPHIL COUNT (OHS): 6.85 K/UL (ref 1.8–7.7)
ALBUMIN SERPL BCP-MCNC: 3.8 G/DL (ref 3.5–5.2)
ALP SERPL-CCNC: 108 UNIT/L (ref 40–150)
ALT SERPL W/O P-5'-P-CCNC: 31 UNIT/L (ref 10–44)
ANION GAP (OHS): 10 MMOL/L (ref 8–16)
AST SERPL-CCNC: 43 UNIT/L (ref 11–45)
BASOPHILS # BLD AUTO: 0.05 K/UL
BASOPHILS NFR BLD AUTO: 0.5 %
BILIRUB SERPL-MCNC: 0.4 MG/DL (ref 0.1–1)
BUN SERPL-MCNC: 13 MG/DL (ref 8–23)
CALCIUM SERPL-MCNC: 9 MG/DL (ref 8.7–10.5)
CHLORIDE SERPL-SCNC: 104 MMOL/L (ref 95–110)
CHOLEST SERPL-MCNC: 190 MG/DL (ref 120–199)
CHOLEST/HDLC SERPL: 4.9 {RATIO} (ref 2–5)
CO2 SERPL-SCNC: 23 MMOL/L (ref 23–29)
CREAT SERPL-MCNC: 0.8 MG/DL (ref 0.5–1.4)
EAG (OHS): 206 MG/DL (ref 68–131)
ERYTHROCYTE [DISTWIDTH] IN BLOOD BY AUTOMATED COUNT: 13.4 % (ref 11.5–14.5)
GFR SERPLBLD CREATININE-BSD FMLA CKD-EPI: >60 ML/MIN/1.73/M2
GLUCOSE SERPL-MCNC: 264 MG/DL (ref 70–110)
HBA1C MFR BLD: 8.8 % (ref 4–5.6)
HCT VFR BLD AUTO: 40.1 % (ref 37–48.5)
HDLC SERPL-MCNC: 39 MG/DL (ref 40–75)
HDLC SERPL: 20.5 % (ref 20–50)
HGB BLD-MCNC: 13 GM/DL (ref 12–16)
HIV 1+2 AB+HIV1 P24 AG SERPL QL IA: NORMAL
IMM GRANULOCYTES # BLD AUTO: 0.05 K/UL (ref 0–0.04)
IMM GRANULOCYTES NFR BLD AUTO: 0.5 % (ref 0–0.5)
LDLC SERPL CALC-MCNC: 90.4 MG/DL (ref 63–159)
LYMPHOCYTES # BLD AUTO: 1.9 K/UL (ref 1–4.8)
MCH RBC QN AUTO: 30.4 PG (ref 27–31)
MCHC RBC AUTO-ENTMCNC: 32.4 G/DL (ref 32–36)
MCV RBC AUTO: 94 FL (ref 82–98)
NONHDLC SERPL-MCNC: 151 MG/DL
NUCLEATED RBC (/100WBC) (OHS): 0 /100 WBC
PLATELET # BLD AUTO: 291 K/UL (ref 150–450)
PMV BLD AUTO: 11.3 FL (ref 9.2–12.9)
POTASSIUM SERPL-SCNC: 4 MMOL/L (ref 3.5–5.1)
PROT SERPL-MCNC: 7.4 GM/DL (ref 6–8.4)
RBC # BLD AUTO: 4.27 M/UL (ref 4–5.4)
RELATIVE EOSINOPHIL (OHS): 3.4 %
RELATIVE LYMPHOCYTE (OHS): 19.2 % (ref 18–48)
RELATIVE MONOCYTE (OHS): 7 % (ref 4–15)
RELATIVE NEUTROPHIL (OHS): 69.4 % (ref 38–73)
SODIUM SERPL-SCNC: 137 MMOL/L (ref 136–145)
TRIGL SERPL-MCNC: 303 MG/DL (ref 30–150)
TSH SERPL-ACNC: 1.79 UIU/ML (ref 0.4–4)
WBC # BLD AUTO: 9.88 K/UL (ref 3.9–12.7)

## 2025-08-28 PROCEDURE — 3077F SYST BP >= 140 MM HG: CPT | Mod: CPTII,,, | Performed by: NURSE PRACTITIONER

## 2025-08-28 PROCEDURE — 3066F NEPHROPATHY DOC TX: CPT | Mod: CPTII,,, | Performed by: NURSE PRACTITIONER

## 2025-08-28 PROCEDURE — 82465 ASSAY BLD/SERUM CHOLESTEROL: CPT | Performed by: NURSE PRACTITIONER

## 2025-08-28 PROCEDURE — 87389 HIV-1 AG W/HIV-1&-2 AB AG IA: CPT | Performed by: NURSE PRACTITIONER

## 2025-08-28 PROCEDURE — 85025 COMPLETE CBC W/AUTO DIFF WBC: CPT | Performed by: NURSE PRACTITIONER

## 2025-08-28 PROCEDURE — 99214 OFFICE O/P EST MOD 30 MIN: CPT | Mod: PBBFAC | Performed by: NURSE PRACTITIONER

## 2025-08-28 PROCEDURE — 82565 ASSAY OF CREATININE: CPT | Performed by: NURSE PRACTITIONER

## 2025-08-28 PROCEDURE — 1159F MED LIST DOCD IN RCRD: CPT | Mod: CPTII,,, | Performed by: NURSE PRACTITIONER

## 2025-08-28 PROCEDURE — 3008F BODY MASS INDEX DOCD: CPT | Mod: CPTII,,, | Performed by: NURSE PRACTITIONER

## 2025-08-28 PROCEDURE — 99213 OFFICE O/P EST LOW 20 MIN: CPT | Mod: S$PBB,,, | Performed by: NURSE PRACTITIONER

## 2025-08-28 PROCEDURE — 4010F ACE/ARB THERAPY RXD/TAKEN: CPT | Mod: CPTII,,, | Performed by: NURSE PRACTITIONER

## 2025-08-28 PROCEDURE — 3078F DIAST BP <80 MM HG: CPT | Mod: CPTII,,, | Performed by: NURSE PRACTITIONER

## 2025-08-28 PROCEDURE — 99999 PR PBB SHADOW E&M-EST. PATIENT-LVL IV: CPT | Mod: PBBFAC,,, | Performed by: NURSE PRACTITIONER

## 2025-08-28 PROCEDURE — 82306 VITAMIN D 25 HYDROXY: CPT | Performed by: NURSE PRACTITIONER

## 2025-08-28 PROCEDURE — 84443 ASSAY THYROID STIM HORMONE: CPT | Performed by: NURSE PRACTITIONER

## 2025-08-28 PROCEDURE — 3062F POS MACROALBUMINURIA REV: CPT | Mod: CPTII,,, | Performed by: NURSE PRACTITIONER

## 2025-08-28 PROCEDURE — 36415 COLL VENOUS BLD VENIPUNCTURE: CPT | Mod: PBBFAC

## 2025-08-28 PROCEDURE — 83036 HEMOGLOBIN GLYCOSYLATED A1C: CPT | Performed by: NURSE PRACTITIONER

## 2025-08-28 PROCEDURE — 99999PBSHW PR PBB SHADOW TECHNICAL ONLY FILED TO HB: Mod: PBBFAC,,,

## 2025-08-28 PROCEDURE — 3052F HG A1C>EQUAL 8.0%<EQUAL 9.0%: CPT | Mod: CPTII,,, | Performed by: NURSE PRACTITIONER

## 2025-08-28 RX ORDER — CHLORTHALIDONE 25 MG/1
25 TABLET ORAL DAILY
COMMUNITY

## 2025-08-28 RX ORDER — ATORVASTATIN CALCIUM 40 MG/1
40 TABLET, FILM COATED ORAL NIGHTLY
COMMUNITY

## 2025-08-28 RX ORDER — NEBIVOLOL 20 MG/1
20 TABLET ORAL DAILY
COMMUNITY
Start: 2025-04-16

## 2025-08-29 ENCOUNTER — TELEPHONE (OUTPATIENT)
Dept: ENDOCRINOLOGY | Facility: CLINIC | Age: 65
End: 2025-08-29
Payer: MEDICAID